# Patient Record
Sex: FEMALE | Employment: UNEMPLOYED | URBAN - METROPOLITAN AREA
[De-identification: names, ages, dates, MRNs, and addresses within clinical notes are randomized per-mention and may not be internally consistent; named-entity substitution may affect disease eponyms.]

---

## 2017-09-21 ENCOUNTER — HOSPITAL ENCOUNTER (OUTPATIENT)
Dept: GENERAL RADIOLOGY | Age: 62
Discharge: HOME | End: 2017-09-21
Attending: FAMILY MEDICINE
Payer: COMMERCIAL

## 2017-09-21 DIAGNOSIS — R68.81 EARLY SATIETY: ICD-10-CM

## 2017-09-21 PROCEDURE — 74241 XR UPPER GI W KUB/ BA SWALLOW: CPT

## 2017-09-21 PROCEDURE — 74247 XR UPPER GI W KUB AIR CONT: CPT

## 2017-09-25 ENCOUNTER — HOSPITAL ENCOUNTER (OUTPATIENT)
Dept: CT IMAGING | Age: 62
Discharge: HOME | End: 2017-09-25
Attending: FAMILY MEDICINE
Payer: COMMERCIAL

## 2017-09-25 DIAGNOSIS — R93.89 ABNORMAL CHEST X-RAY: ICD-10-CM

## 2017-09-25 LAB — CREAT BLD-MCNC: 0.9 MG/DL (ref 0.6–1.3)

## 2017-09-25 PROCEDURE — 74011636320 HC RX REV CODE- 636/320: Performed by: RADIOLOGY

## 2017-09-25 PROCEDURE — 74011000258 HC RX REV CODE- 258: Performed by: RADIOLOGY

## 2017-09-25 PROCEDURE — 71260 CT THORAX DX C+: CPT

## 2017-09-25 PROCEDURE — 82565 ASSAY OF CREATININE: CPT

## 2017-09-25 RX ORDER — SODIUM CHLORIDE 0.9 % (FLUSH) 0.9 %
10 SYRINGE (ML) INJECTION
Status: COMPLETED | OUTPATIENT
Start: 2017-09-25 | End: 2017-09-25

## 2017-09-25 RX ADMIN — SODIUM CHLORIDE 100 ML: 900 INJECTION, SOLUTION INTRAVENOUS at 10:55

## 2017-09-25 RX ADMIN — Medication 10 ML: at 10:55

## 2017-09-25 RX ADMIN — IOPAMIDOL 100 ML: 612 INJECTION, SOLUTION INTRAVENOUS at 10:55

## 2017-09-28 ENCOUNTER — HOSPITAL ENCOUNTER (OUTPATIENT)
Dept: MRI IMAGING | Age: 62
Discharge: HOME | End: 2017-09-28
Attending: INTERNAL MEDICINE
Payer: COMMERCIAL

## 2017-09-28 DIAGNOSIS — R22.2 LOCALIZED SWELLING, MASS AND LUMP, TRUNK: ICD-10-CM

## 2017-09-28 DIAGNOSIS — R51.9 HEAD ACHE: ICD-10-CM

## 2017-09-28 DIAGNOSIS — R93.89 ABNORMAL RADIOLOGICAL FINDINGS IN SKIN AND SUBCUTANEOUS TISSUE: ICD-10-CM

## 2017-09-28 PROCEDURE — 70553 MRI BRAIN STEM W/O & W/DYE: CPT

## 2017-09-28 PROCEDURE — A9576 INJ PROHANCE MULTIPACK: HCPCS | Performed by: INTERNAL MEDICINE

## 2017-09-28 PROCEDURE — 74011250636 HC RX REV CODE- 250/636: Performed by: INTERNAL MEDICINE

## 2017-09-28 RX ADMIN — GADOTERIDOL 14 ML: 279.3 INJECTION, SOLUTION INTRAVENOUS at 19:00

## 2017-09-29 ENCOUNTER — HOSPITAL ENCOUNTER (OUTPATIENT)
Dept: NUCLEAR MEDICINE | Age: 62
Discharge: HOME | End: 2017-09-29
Attending: INTERNAL MEDICINE
Payer: COMMERCIAL

## 2017-09-29 DIAGNOSIS — R22.2 LOCALIZED SWELLING, MASS AND LUMP, TRUNK: ICD-10-CM

## 2017-09-29 DIAGNOSIS — R93.89 ABNORMAL RADIOLOGICAL FINDINGS IN SKIN AND SUBCUTANEOUS TISSUE: ICD-10-CM

## 2017-09-29 DIAGNOSIS — R51.9 HEAD ACHE: ICD-10-CM

## 2017-09-29 PROCEDURE — 78306 BONE IMAGING WHOLE BODY: CPT

## 2017-10-02 ENCOUNTER — HOSPITAL ENCOUNTER (OUTPATIENT)
Dept: CT IMAGING | Age: 62
Discharge: HOME | End: 2017-10-02
Attending: INTERNAL MEDICINE
Payer: COMMERCIAL

## 2017-10-02 VITALS
HEIGHT: 62 IN | BODY MASS INDEX: 25.95 KG/M2 | TEMPERATURE: 98.6 F | OXYGEN SATURATION: 100 % | SYSTOLIC BLOOD PRESSURE: 154 MMHG | RESPIRATION RATE: 20 BRPM | DIASTOLIC BLOOD PRESSURE: 68 MMHG | HEART RATE: 56 BPM | WEIGHT: 141 LBS

## 2017-10-02 DIAGNOSIS — R51.9 HEAD ACHE: ICD-10-CM

## 2017-10-02 DIAGNOSIS — R22.2 LOCALIZED SWELLING, MASS AND LUMP, TRUNK: ICD-10-CM

## 2017-10-02 DIAGNOSIS — R93.89 ABNORMAL RADIOLOGICAL FINDINGS IN SKIN AND SUBCUTANEOUS TISSUE: ICD-10-CM

## 2017-10-02 PROCEDURE — 47000 NEEDLE BIOPSY OF LIVER PERQ: CPT

## 2017-10-02 PROCEDURE — 88342 IMHCHEM/IMCYTCHM 1ST ANTB: CPT | Performed by: INTERNAL MEDICINE

## 2017-10-02 PROCEDURE — 77030014006 HC SPNG HEMSTAT J&J -A

## 2017-10-02 PROCEDURE — 88173 CYTOPATH EVAL FNA REPORT: CPT | Performed by: INTERNAL MEDICINE

## 2017-10-02 PROCEDURE — 77030019698 HC SYR ANGI MDLON MRTM -A

## 2017-10-02 PROCEDURE — 74011250636 HC RX REV CODE- 250/636: Performed by: RADIOLOGY

## 2017-10-02 PROCEDURE — 88307 TISSUE EXAM BY PATHOLOGIST: CPT | Performed by: INTERNAL MEDICINE

## 2017-10-02 PROCEDURE — 77030003503 HC NDL BIOP TISS BD -B

## 2017-10-02 PROCEDURE — 88360 TUMOR IMMUNOHISTOCHEM/MANUAL: CPT | Performed by: INTERNAL MEDICINE

## 2017-10-02 RX ORDER — FENTANYL CITRATE 50 UG/ML
100 INJECTION, SOLUTION INTRAMUSCULAR; INTRAVENOUS
Status: DISCONTINUED | OUTPATIENT
Start: 2017-10-02 | End: 2017-10-06 | Stop reason: HOSPADM

## 2017-10-02 RX ORDER — MIDAZOLAM HYDROCHLORIDE 1 MG/ML
5 INJECTION, SOLUTION INTRAMUSCULAR; INTRAVENOUS
Status: DISCONTINUED | OUTPATIENT
Start: 2017-10-02 | End: 2017-10-06 | Stop reason: HOSPADM

## 2017-10-02 RX ORDER — MICONAZOLE NITRATE 2 %
1 CREAM WITH APPLICATOR VAGINAL 2 TIMES DAILY
COMMUNITY

## 2017-10-02 RX ORDER — AMITRIPTYLINE HYDROCHLORIDE 10 MG/1
TABLET, FILM COATED ORAL
COMMUNITY
End: 2018-08-31

## 2017-10-02 RX ORDER — TRIAMCINOLONE ACETONIDE 1 MG/ML
LOTION TOPICAL 2 TIMES DAILY
COMMUNITY

## 2017-10-02 RX ORDER — FOLIC ACID 1 MG/1
1 TABLET ORAL DAILY
COMMUNITY

## 2017-10-02 RX ORDER — ESTRADIOL 0.1 MG/G
2 CREAM VAGINAL DAILY
COMMUNITY
End: 2018-09-26

## 2017-10-02 RX ORDER — METHOTREXATE 2.5 MG/1
2.5 TABLET ORAL
COMMUNITY
End: 2018-09-26

## 2017-10-02 RX ORDER — ASPIRIN 81 MG/1
81 TABLET ORAL DAILY
COMMUNITY

## 2017-10-02 RX ORDER — OMEPRAZOLE 20 MG/1
20 CAPSULE, DELAYED RELEASE ORAL DAILY
COMMUNITY
End: 2018-10-04

## 2017-10-02 RX ORDER — SODIUM CHLORIDE 9 MG/ML
25 INJECTION, SOLUTION INTRAVENOUS CONTINUOUS
Status: DISCONTINUED | OUTPATIENT
Start: 2017-10-02 | End: 2017-10-06 | Stop reason: HOSPADM

## 2017-10-02 RX ORDER — AMLODIPINE BESYLATE 2.5 MG/1
5 TABLET ORAL DAILY
COMMUNITY
End: 2018-08-31

## 2017-10-02 RX ORDER — HYDROCODONE BITARTRATE AND ACETAMINOPHEN 10; 325 MG/1; MG/1
2 TABLET ORAL
COMMUNITY
End: 2018-09-26

## 2017-10-02 RX ORDER — HYDROXYCHLOROQUINE SULFATE 200 MG/1
200 TABLET, FILM COATED ORAL DAILY
COMMUNITY
End: 2018-09-26

## 2017-10-02 RX ORDER — BISMUTH SUBSALICYLATE 262 MG
1 TABLET,CHEWABLE ORAL DAILY
COMMUNITY

## 2017-10-02 RX ADMIN — MIDAZOLAM HYDROCHLORIDE 2 MG: 1 INJECTION INTRAMUSCULAR; INTRAVENOUS at 10:54

## 2017-10-02 RX ADMIN — FENTANYL CITRATE 50 MCG: 50 INJECTION, SOLUTION INTRAMUSCULAR; INTRAVENOUS at 10:55

## 2017-10-02 RX ADMIN — FENTANYL CITRATE 50 MCG: 50 INJECTION, SOLUTION INTRAMUSCULAR; INTRAVENOUS at 11:12

## 2017-10-02 RX ADMIN — SODIUM CHLORIDE 25 ML/HR: 900 INJECTION, SOLUTION INTRAVENOUS at 10:00

## 2017-10-02 RX ADMIN — MIDAZOLAM HYDROCHLORIDE 1 MG: 1 INJECTION INTRAMUSCULAR; INTRAVENOUS at 11:08

## 2017-10-02 NOTE — H&P
Radiology History and Physical    Patient: Joshua Mckay 60 y.o. female       Chief Complaint: No chief complaint on file. History of Present Illness: liver lesions     History:    No past medical history on file. No family history on file. Social History     Social History    Marital status:      Spouse name: N/A    Number of children: N/A    Years of education: N/A     Occupational History    Not on file. Social History Main Topics    Smoking status: Not on file    Smokeless tobacco: Not on file    Alcohol use Not on file    Drug use: Not on file    Sexual activity: Not on file     Other Topics Concern    Not on file     Social History Narrative       Allergies: Not on File    Current Medications:  Current Outpatient Prescriptions   Medication Sig    HYDROcodone-acetaminophen (NORCO)  mg tablet Take 2 Tabs by mouth every six (6) hours as needed for Pain.  hydroxychloroquine (PLAQUENIL) 200 mg tablet Take 200 mg by mouth daily.  methotrexate (RHEUMATREX) 2.5 mg tablet Take 2.5 mg by mouth.  folic acid (FOLVITE) 1 mg tablet Take 1 mg by mouth daily.  omeprazole (PRILOSEC) 20 mg capsule Take 20 mg by mouth daily.  amLODIPine (NORVASC) 2.5 mg tablet Take 2.5 mg by mouth daily.  amitriptyline (ELAVIL) 10 mg tablet Take  by mouth nightly.  estradiol (ESTRACE) 0.01 % (0.1 mg/gram) vaginal cream Insert 2 g into vagina daily.  triamcinolone (KENALOG) 0.1 % lotion Apply  to affected area two (2) times a day. use thin layer    aspirin delayed-release 81 mg tablet Take 81 mg by mouth daily.  calcium citrate-vitamin D3 (CITRACAL + D) tablet Take 1 Tab by mouth two (2) times a day.  multivitamin (ONE A DAY) tablet Take 1 Tab by mouth daily.      Current Facility-Administered Medications   Medication Dose Route Frequency    midazolam (VERSED) injection 5 mg  5 mg IntraVENous Multiple    fentaNYL citrate (PF) injection 100 mcg  100 mcg IntraVENous Multiple    0.9% sodium chloride infusion  25 mL/hr IntraVENous CONTINUOUS        Physical Exam:  Blood pressure 156/68, pulse (!) 56, temperature 98.6 °F (37 °C), resp. rate 20, height 5' 2\" (1.575 m), weight 64 kg (141 lb), SpO2 100 %. LUNG: clear to auscultation bilaterally, HEART: regular rate and rhythm, S1, S2 normal, no murmur, click, rub or gallop      Alerts:      Laboratory:    No results for input(s): HGB, HCT, WBC, PLT, INR, BUN, CREA, K, CRCLT, HGBEXT, HCTEXT, PLTEXT in the last 72 hours. No lab exists for component: PTT, PT, INREXT      Plan of Care/Planned Procedure:  Risks, benefits, and alternatives reviewed with patient and she agrees to proceed with the procedure.      Plan is for CT guided liver lesion biopsy       Chad Lou MD

## 2017-10-02 NOTE — IP AVS SNAPSHOT
[address] [facility] 
[phone] Patient: Joshua Mckay MRN: [id number] DZA:[id number] You are allergic to the following Not on File Recent Documentation Height Weight BMI  
  
  
 1.575 m 64 kg 25.79 kg/m2 Emergency Contacts Name Discharge Info Relation Home Work Mobile [address] [facility]  [address] CAREGIVER [3] Spouse [3] [phone] About your hospitalization You were admitted on:  October 2, 2017 You last received care in the:  [facility] RAD CT You were discharged on:  October 2, 2017 Unit phone number:  [phone] Why you were hospitalized Your primary diagnosis was:  Not on File Providers Seen During Your Hospitalizations Provider Role Specialty Primary office phone Lennox Diss, MD Attending Provider Hematology and Oncology [phone] Your Primary Care Physician (PCP) Primary Care Physician Office Phone Office Fax Radha Alonso [phone] Follow-up Information None Current Discharge Medication List  
  
ASK your doctor about these medications Dose & Instructions Dispensing Information Comments Morning Noon Evening Bedtime  
 amitriptyline 10 mg tablet Commonly known as:  ELAVIL Your last dose was: Your next dose is: Take  by mouth nightly. Refills:  0  
     
   
   
   
  
 amLODIPine 2.5 mg tablet Commonly known as:  [place] Your last dose was: Your next dose is:    
   
   
 Dose:  2.5 mg Take 2.5 mg by mouth daily. Refills:  0  
     
   
   
   
  
 aspirin delayed-release 81 mg tablet Your last dose was: Your next dose is:    
   
   
 Dose:  81 mg Take 81 mg by mouth daily. Refills:  0 Citracal + D tablet Generic drug:  calcium citrate-vitamin D3  
   
 Your last dose was: Your next dose is:    
   
   
 Dose:  1 Tab Take 1 Tab by mouth two (2) times a day. Refills:  0 ESTRACE 0.01 % (0.1 mg/gram) vaginal cream  
Generic drug:  estradiol Your last dose was: Your next dose is:    
   
   
 Dose:  2 g Insert 2 g into vagina daily. Refills:  0  
     
   
   
   
  
 folic acid 1 mg tablet Commonly known as:  [employer] Your last dose was: Your next dose is:    
   
   
 Dose:  1 mg Take 1 mg by mouth daily. Refills:  0 HYDROcodone-acetaminophen  mg tablet Commonly known as:  [employer] Your last dose was: Your next dose is:    
   
   
 Dose:  2 Tab Take 2 Tabs by mouth every six (6) hours as needed for Pain. Refills:  0  
     
   
   
   
  
 hydroxychloroquine 200 mg tablet Commonly known as:  PLAQUENIL Your last dose was: Your next dose is:    
   
   
 Dose:  200 mg Take 200 mg by mouth daily. Refills:  0  
     
   
   
   
  
 methotrexate 2.5 mg tablet Commonly known as:  [employer] [place] Your last dose was: Your next dose is:    
   
   
 Dose:  2.5 mg Take 2.5 mg by mouth. Refills:  0  
     
   
   
   
  
 multivitamin tablet Commonly known as:  ONE A DAY Your last dose was: Your next dose is:    
   
   
 Dose:  1 Tab Take 1 Tab by mouth daily. Refills:  0  
     
   
   
   
  
 omeprazole 20 mg capsule Commonly known as:  PRILOSEC Your last dose was: Your next dose is:    
   
   
 Dose:  20 mg Take 20 mg by mouth daily. Refills:  0  
     
   
   
   
  
 triamcinolone 0.1 % lotion Commonly known as:  KENALOG Your last dose was: Your next dose is:    
   
   
 Apply  to affected area two (2) times a day. use thin layer Refills:  0 Discharge Instructions [facility] Special Procedures/Radiology Department Radiologist:  Dr. Izzy Gross Date:10/2/2017 Liver Biopsy Discharge Instructions You may have an aching pain in the biopsy site tonight. Take Tylenol, as directed on the label, for pain or discomfort. Avoid ibuprofen (Advil, Motrin) and aspirin for the next 48 hours as these drugs may cause you to bleed. Resume your previous diet and follow the medication reconciliation form. Rest today. Do not drive or sign any legal documents activity for 24 hours because you received sedation medications. Avoid any strenuous activity for 24 hours. Do not lift anything heavier than a small grocery bag (10 pounds) and avoid twisting for the next 5 days. If you experience severe sweating, severe abdominal pain, dizziness or faintness, go to the nearest Emergency Room immediately. Pain under the left collar bone is normal. 
 
Watch for signs of infection at biopsy site:  redness, pain, drainage, fever chills. If this occurs, call you doctor. Contact your physician after 5 business days for test results. If you have any questions or concerns, please call [phone] and ask to speak to the nurse on-call. Discharge Orders None Introducing [facility]! Dear Saranya Found: Thank you for requesting a [employer] account. Our records indicate that you already have an active [employer] account. You can access your account anytime at [email or web address] Did you know that you can access your hospital and ER discharge instructions at any time in [employer]? You can also review all of your test results from your hospital stay or ER visit. Additional Information If you have questions, please visit the Frequently Asked Questions section of the [employer] website at [email or web address] Remember, [employer] is NOT to be used for urgent needs. For medical emergencies, dial 911. Now available from your iPhone and Android! General Information Please provide this summary of care documentation to your next provider. Patient Signature:  ____________________________________________________________ Date:  ____________________________________________________________  
  
Suzon Mems Provider Signature:  ____________________________________________________________ Date:  ____________________________________________________________

## 2017-10-02 NOTE — DISCHARGE INSTRUCTIONS
[address] [facility]  Special Procedures/Radiology Department    Radiologist:  Dr. Anahi Munguia    Date:10/2/2017    Liver Biopsy Discharge Instructions    You may have an aching pain in the biopsy site tonight. Take Tylenol, as directed on the label, for pain or discomfort. Avoid ibuprofen (Advil, Motrin) and aspirin for the next 48 hours as these drugs may cause you to bleed. Resume your previous diet and follow the medication reconciliation form. Rest today. Do not drive or sign any legal documents activity for 24 hours because you received sedation medications. Avoid any strenuous activity for 24 hours. Do not lift anything heavier than a small grocery bag (10 pounds) and avoid twisting for the next 5 days. If you experience severe sweating, severe abdominal pain, dizziness or faintness, go to the nearest Emergency Room immediately. Pain under the left collar bone is normal.    Watch for signs of infection at biopsy site:  redness, pain, drainage, fever chills. If this occurs, call you doctor. Contact your physician after 5 business days for test results. If you have any questions or concerns, please call [phone] and ask to speak to the nurse on-call.

## 2017-10-09 ENCOUNTER — HOSPITAL ENCOUNTER (OUTPATIENT)
Dept: GENERAL RADIOLOGY | Age: 62
Discharge: HOME | End: 2017-10-09
Payer: COMMERCIAL

## 2017-10-09 DIAGNOSIS — M25.511 RIGHT SHOULDER PAIN: ICD-10-CM

## 2017-10-09 PROCEDURE — 73030 X-RAY EXAM OF SHOULDER: CPT

## 2017-10-13 ENCOUNTER — HOSPITAL ENCOUNTER (OUTPATIENT)
Dept: MRI IMAGING | Age: 62
Discharge: HOME | End: 2017-10-13
Attending: INTERNAL MEDICINE
Payer: COMMERCIAL

## 2017-10-13 VITALS — BODY MASS INDEX: 25.79 KG/M2 | WEIGHT: 141 LBS

## 2017-10-13 DIAGNOSIS — C34.32 MALIGNANT NEOPLASM OF LOWER LOBE, LEFT BRONCHUS OR LUNG (CODE): ICD-10-CM

## 2017-10-13 DIAGNOSIS — C79.51 SECONDARY MALIGNANT NEOPLASM OF BONE (HCC): ICD-10-CM

## 2017-10-13 DIAGNOSIS — C78.7 SECONDARY MALIGNANT NEOPLASM OF LIVER AND INTRAHEPATIC BILE DUCT (HCC): ICD-10-CM

## 2017-10-13 PROCEDURE — A9576 INJ PROHANCE MULTIPACK: HCPCS | Performed by: INTERNAL MEDICINE

## 2017-10-13 PROCEDURE — 72158 MRI LUMBAR SPINE W/O & W/DYE: CPT

## 2017-10-13 PROCEDURE — 74011250636 HC RX REV CODE- 250/636: Performed by: INTERNAL MEDICINE

## 2017-10-13 RX ADMIN — GADOTERIDOL 13 ML: 279.3 INJECTION, SOLUTION INTRAVENOUS at 20:00

## 2018-04-15 ENCOUNTER — HOSPITAL ENCOUNTER (EMERGENCY)
Age: 63
Discharge: HOME | End: 2018-04-15
Attending: STUDENT IN AN ORGANIZED HEALTH CARE EDUCATION/TRAINING PROGRAM
Payer: COMMERCIAL

## 2018-04-15 ENCOUNTER — APPOINTMENT (OUTPATIENT)
Dept: CT IMAGING | Age: 63
End: 2018-04-15
Attending: STUDENT IN AN ORGANIZED HEALTH CARE EDUCATION/TRAINING PROGRAM
Payer: COMMERCIAL

## 2018-04-15 VITALS
HEART RATE: 47 BPM | SYSTOLIC BLOOD PRESSURE: 160 MMHG | DIASTOLIC BLOOD PRESSURE: 69 MMHG | BODY MASS INDEX: 26.1 KG/M2 | WEIGHT: 147.31 LBS | TEMPERATURE: 97.7 F | HEIGHT: 63 IN | OXYGEN SATURATION: 100 % | RESPIRATION RATE: 18 BRPM

## 2018-04-15 DIAGNOSIS — R42 VERTIGO: Primary | ICD-10-CM

## 2018-04-15 LAB
ALBUMIN SERPL-MCNC: 3.1 G/DL (ref 3.5–5)
ALBUMIN/GLOB SERPL: 0.6 {RATIO} (ref 1.1–2.2)
ALP SERPL-CCNC: 72 U/L (ref 45–117)
ALT SERPL-CCNC: 140 U/L (ref 12–78)
ANION GAP SERPL CALC-SCNC: 9 MMOL/L (ref 5–15)
AST SERPL-CCNC: 89 U/L (ref 15–37)
ATRIAL RATE: 50 BPM
BASOPHILS # BLD: 0 K/UL (ref 0–0.1)
BASOPHILS NFR BLD: 0 % (ref 0–1)
BILIRUB SERPL-MCNC: 0.6 MG/DL (ref 0.2–1)
BUN SERPL-MCNC: 18 MG/DL (ref 6–20)
BUN/CREAT SERPL: 18 (ref 12–20)
CALCIUM SERPL-MCNC: 9.1 MG/DL (ref 8.5–10.1)
CALCULATED P AXIS, ECG09: 67 DEGREES
CALCULATED R AXIS, ECG10: 71 DEGREES
CALCULATED T AXIS, ECG11: 60 DEGREES
CHLORIDE SERPL-SCNC: 103 MMOL/L (ref 97–108)
CO2 SERPL-SCNC: 26 MMOL/L (ref 21–32)
CREAT SERPL-MCNC: 1.01 MG/DL (ref 0.55–1.02)
DIAGNOSIS, 93000: NORMAL
DIFFERENTIAL METHOD BLD: ABNORMAL
EOSINOPHIL # BLD: 0.1 K/UL (ref 0–0.4)
EOSINOPHIL NFR BLD: 1 % (ref 0–7)
ERYTHROCYTE [DISTWIDTH] IN BLOOD BY AUTOMATED COUNT: 16.4 % (ref 11.5–14.5)
GLOBULIN SER CALC-MCNC: 5.2 G/DL (ref 2–4)
GLUCOSE SERPL-MCNC: 110 MG/DL (ref 65–100)
HCT VFR BLD AUTO: 38.9 % (ref 35–47)
HGB BLD-MCNC: 12.4 G/DL (ref 11.5–16)
IMM GRANULOCYTES # BLD: 0 K/UL (ref 0–0.04)
IMM GRANULOCYTES NFR BLD AUTO: 0 % (ref 0–0.5)
LYMPHOCYTES # BLD: 1.2 K/UL (ref 0.8–3.5)
LYMPHOCYTES NFR BLD: 15 % (ref 12–49)
MCH RBC QN AUTO: 29.3 PG (ref 26–34)
MCHC RBC AUTO-ENTMCNC: 31.9 G/DL (ref 30–36.5)
MCV RBC AUTO: 92 FL (ref 80–99)
MONOCYTES # BLD: 0.8 K/UL (ref 0–1)
MONOCYTES NFR BLD: 11 % (ref 5–13)
NEUTS SEG # BLD: 5.4 K/UL (ref 1.8–8)
NEUTS SEG NFR BLD: 72 % (ref 32–75)
NRBC # BLD: 0 K/UL (ref 0–0.01)
NRBC BLD-RTO: 0 PER 100 WBC
P-R INTERVAL, ECG05: 144 MS
PLATELET # BLD AUTO: 269 K/UL (ref 150–400)
PMV BLD AUTO: 9.3 FL (ref 8.9–12.9)
POTASSIUM SERPL-SCNC: 3.5 MMOL/L (ref 3.5–5.1)
PROT SERPL-MCNC: 8.3 G/DL (ref 6.4–8.2)
Q-T INTERVAL, ECG07: 448 MS
QRS DURATION, ECG06: 78 MS
QTC CALCULATION (BEZET), ECG08: 408 MS
RBC # BLD AUTO: 4.23 M/UL (ref 3.8–5.2)
SODIUM SERPL-SCNC: 138 MMOL/L (ref 136–145)
TROPONIN I SERPL-MCNC: <0.04 NG/ML
VENTRICULAR RATE, ECG03: 50 BPM
WBC # BLD AUTO: 7.5 K/UL (ref 3.6–11)

## 2018-04-15 PROCEDURE — 36415 COLL VENOUS BLD VENIPUNCTURE: CPT | Performed by: NURSE PRACTITIONER

## 2018-04-15 PROCEDURE — 85025 COMPLETE CBC W/AUTO DIFF WBC: CPT | Performed by: NURSE PRACTITIONER

## 2018-04-15 PROCEDURE — 80053 COMPREHEN METABOLIC PANEL: CPT | Performed by: NURSE PRACTITIONER

## 2018-04-15 PROCEDURE — 70450 CT HEAD/BRAIN W/O DYE: CPT

## 2018-04-15 PROCEDURE — 84484 ASSAY OF TROPONIN QUANT: CPT | Performed by: NURSE PRACTITIONER

## 2018-04-15 PROCEDURE — 99284 EMERGENCY DEPT VISIT MOD MDM: CPT

## 2018-04-15 PROCEDURE — 93005 ELECTROCARDIOGRAM TRACING: CPT

## 2018-04-15 PROCEDURE — 74011250636 HC RX REV CODE- 250/636: Performed by: STUDENT IN AN ORGANIZED HEALTH CARE EDUCATION/TRAINING PROGRAM

## 2018-04-15 RX ORDER — MECLIZINE HCL 12.5 MG 12.5 MG/1
25 TABLET ORAL
Status: COMPLETED | OUTPATIENT
Start: 2018-04-15 | End: 2018-04-15

## 2018-04-15 RX ORDER — MECLIZINE HYDROCHLORIDE 25 MG/1
25 TABLET ORAL
Qty: 30 TAB | Refills: 0 | Status: SHIPPED | OUTPATIENT
Start: 2018-04-15 | End: 2018-04-25

## 2018-04-15 RX ADMIN — MECLIZINE 25 MG: 12.5 TABLET ORAL at 15:16

## 2018-04-15 NOTE — ED TRIAGE NOTES
Pt states that while walking into [employer] this morning she had her first episode of feeling like her head was spinning. She states that she has had a total of 3 different episodes and states that spinning becomes worse with sudden movements of her head.

## 2018-04-15 NOTE — ED PROVIDER NOTES
HPI Comments: 58 y.o. female with past medical history significant for Lung cancer, HTN, GERD, Lupus who presents from home with chief complaint of dizziness. Pt reports an acute onset of dizziness this morning while exiting the vehicle headed into [employer]. She states that she became dizzy upon turning her head. She describes dizziness as \"my head was spinning\" which made her gait unsteady. This episode lasted for \"a couple seconds\". She had an additional episode while in [place]. She denies hx of similar symptoms. She is currently takes a chemotherapy agent for lung cancer. Pt denies any tinnitus, hearing loss or headache. There are no other acute medical concerns at this time. PCP: Isabel Dickinson MD    Note written by Scott Araujo, as dictated by Herber Pina MD 3:29 PM         The history is provided by the patient. No  was used. Past Medical History:   Diagnosis Date    Arthritis     Autoimmune disease ([facility] [address]     lupus    Cancer ([facility] [address]     lung cancer    Gastrointestinal disorder     GERD    Hypertension        History reviewed. No pertinent surgical history. History reviewed. No pertinent family history. Social History     Social History    Marital status:      Spouse name: N/A    Number of children: N/A    Years of education: N/A     Occupational History    Not on file. Social History Main Topics    Smoking status: Never Smoker    Smokeless tobacco: Never Used    Alcohol use Yes    Drug use: No    Sexual activity: Not on file     Other Topics Concern    Not on file     Social History Narrative    No narrative on file         ALLERGIES: Review of patient's allergies indicates no known allergies. Review of Systems   Constitutional: Negative for fever. HENT: Negative for congestion. Respiratory: Negative for shortness of breath. Cardiovascular: Negative for chest pain.    Gastrointestinal: Negative for abdominal pain, nausea and vomiting. Genitourinary: Negative for difficulty urinating. Musculoskeletal: Positive for gait problem (unsteady). Negative for back pain and neck pain. Neurological: Positive for dizziness. Negative for headaches. All other systems reviewed and are negative. Vitals:    04/15/18 1237   BP: 182/81   Pulse: (!) 53   Resp: 16   Temp: 97.7 °F (36.5 °C)   SpO2: (!) 20%   Weight: 66.8 kg (147 lb 5 oz)   Height: 5' 2.5\" (1.588 m)            Physical Exam   Constitutional: She is oriented to person, place, and time. She appears well-developed and well-nourished. No distress. HENT:   Head: Normocephalic and atraumatic. Nose: Nose normal.   Mouth/Throat: Oropharynx is clear and moist. No oropharyngeal exudate. Eyes: Conjunctivae and EOM are normal. Right eye exhibits no discharge. Left eye exhibits no discharge. No scleral icterus. Neck: Normal range of motion. Neck supple. No JVD present. No tracheal deviation present. No thyromegaly present. Cardiovascular: Normal rate, regular rhythm, normal heart sounds and intact distal pulses. Exam reveals no gallop and no friction rub. No murmur heard. Pulmonary/Chest: Effort normal and breath sounds normal. No stridor. No respiratory distress. She has no wheezes. She has no rales. She exhibits no tenderness. Abdominal: Bowel sounds are normal. She exhibits no distension and no mass. There is no tenderness. There is no rebound. Musculoskeletal: Normal range of motion. She exhibits no edema or tenderness. Lymphadenopathy:     She has no cervical adenopathy. Neurological: She is alert and oriented to person, place, and time. No cranial nerve deficit. Coordination normal.   Skin: Skin is warm and dry. No rash noted. She is not diaphoretic. No erythema. No pallor. Psychiatric: She has a normal mood and affect.  Her behavior is normal. Judgment and thought content normal.    Note written by Scott Grider, as dictated by Catia Del Rosario Geoff Puente MD 3:29 PM    MDM  Number of Diagnoses or Management Options  Vertigo:      Amount and/or Complexity of Data Reviewed  Clinical lab tests: ordered and reviewed  Tests in the radiology section of CPT®: ordered and reviewed  Review and summarize past medical records: yes  Independent visualization of images, tracings, or specimens: yes    Risk of Complications, Morbidity, and/or Mortality  Presenting problems: moderate  Diagnostic procedures: moderate  Management options: moderate    Patient Progress  Patient progress: improved        ED Course       Procedures    ED EKG interpretation:  Rhythm: sinus bradycardia; Rate (approx.): 50. Note written by Scott Joshua, as dictated by Damaris Buckner MD 3:12 PM    PROGRESS NOTE:  4:37 PM  Pt feeling better after Meclizine. Symptoms have resolved. 10:31 PM  The patient has been reevaluated. The patient is ready for discharge. The patient's signs, symptoms, diagnosis, and discharge instructions have been discussed and the patient/ family has conveyed their understanding. The patient is to follow up as recommended or return to the ED should their symptoms worsen. Plan has been discussed and the patient is in agreement. LABORATORY TESTS:  No results found for this or any previous visit (from the past 12 hour(s)). IMAGING RESULTS:  CT HEAD WO CONT   Final Result        No results found. MEDICATIONS GIVEN:  Medications   meclizine (ANTIVERT) tablet 25 mg (25 mg Oral Given 4/15/18 1516)       IMPRESSION:  1. Vertigo        PLAN:  1. Discharge Medication List as of 4/15/2018  4:38 PM      START taking these medications    Details   meclizine (ANTIVERT) 25 mg tablet Take 1 Tab by mouth three (3) times daily as needed for up to 10 days. , Print, Disp-30 Tab, R-0         CONTINUE these medications which have NOT CHANGED    Details   HYDROcodone-acetaminophen (NORCO)  mg tablet Take 2 Tabs by mouth every six (6) hours as needed for Pain., Historical Med      hydroxychloroquine (PLAQUENIL) 200 mg tablet Take 200 mg by mouth daily. , Historical Med      methotrexate (RHEUMATREX) 2.5 mg tablet Take 2.5 mg by mouth., Historical Med      folic acid (FOLVITE) 1 mg tablet Take 1 mg by mouth daily. , Historical Med      omeprazole (PRILOSEC) 20 mg capsule Take 20 mg by mouth daily. , Historical Med      amLODIPine (NORVASC) 2.5 mg tablet Take 2.5 mg by mouth daily. , Historical Med      amitriptyline (ELAVIL) 10 mg tablet Take  by mouth nightly., Historical Med      estradiol (ESTRACE) 0.01 % (0.1 mg/gram) vaginal cream Insert 2 g into vagina daily. , Historical Med      triamcinolone (KENALOG) 0.1 % lotion Apply  to affected area two (2) times a day. use thin layer, Historical Med      aspirin delayed-release 81 mg tablet Take 81 mg by mouth daily. , Historical Med      calcium citrate-vitamin D3 (CITRACAL + D) tablet Take 1 Tab by mouth two (2) times a day., Historical Med      multivitamin (ONE A DAY) tablet Take 1 Tab by mouth daily. , Historical Med           2.    Follow-up Information     Follow up With Details Comments Contact Info    Viri Pettit MD  If symptoms worsen [facility] [address] [phone]      [facility] [place] [facility]  If symptoms worsen [address] [facility]  [phone]            Return to ED for new or worsening symptoms       Matt Shaw MD

## 2018-04-15 NOTE — DISCHARGE INSTRUCTIONS
Vertigo: Care Instructions  Your Care Instructions    Vertigo is the feeling that you or your surroundings are moving when there is no actual movement. It is often described as a feeling of spinning, whirling, falling, or tilting. Vertigo may make you vomit or feel nauseated. You may have trouble standing or walking and may lose your balance. Vertigo is often related to an inner ear problem, but it can have other more serious causes. If vertigo continues, you may need more tests to find its cause. Follow-up care is a key part of your treatment and safety. Be sure to make and go to all appointments, and call your doctor if you are having problems. It's also a good idea to know your test results and keep a list of the medicines you take. How can you care for yourself at home? · Do not lie flat on your back. Prop yourself up slightly. This may reduce the spinning feeling. Keep your eyes open. · Move slowly so that you do not fall. · If your doctor recommends medicine, take it exactly as directed. · Do not drive while you are having vertigo. Certain exercises, called Cee-Daroff exercises, can help decrease vertigo. To do Cee-Daroff exercises:  · Sit on the edge of a bed or sofa and quickly lie down on the side that causes the worst vertigo. Lie on your side with your ear down. · Stay in this position for at least 30 seconds or until the vertigo goes away. · Sit up. If this causes vertigo, wait for it to stop. · Repeat the procedure on the other side. · Repeat this 10 times. Do these exercises 2 times a day until the vertigo is gone. When should you call for help? Call 911 anytime you think you may need emergency care. For example, call if:  ? · You passed out (lost consciousness). ? · You have symptoms of a stroke. These may include:  ¨ Sudden numbness, tingling, weakness, or loss of movement in your face, arm, or leg, especially on only one side of your body.   ¨ Sudden vision changes. ¨ Sudden trouble speaking. ¨ Sudden confusion or trouble understanding simple statements. ¨ Sudden problems with walking or balance. ¨ A sudden, severe headache that is different from past headaches. ?Call your doctor now or seek immediate medical care if:  ? · Vertigo occurs with a fever, a headache, or ringing in your ears. ? · You have new or increased nausea and vomiting. ? Watch closely for changes in your health, and be sure to contact your doctor if:  ? · Vertigo gets worse or happens more often. ? · Vertigo has not gotten better after 2 weeks. Where can you learn more? Go to http://jami-jose alfredo.info/. Enter E877 in the search box to learn more about \"Vertigo: Care Instructions. \"  Current as of: May 12, 2017  Content Version: 11.4  © 3294-0552 [employer]. Care instructions adapted under license by [employer] (which disclaims liability or warranty for this information). If you have questions about a medical condition or this instruction, always ask your healthcare professional. [place] [address] any warranty or liability for your use of this information.

## 2018-04-15 NOTE — ED NOTES
12:38 PM  I have evaluated the patient as the Provider in Triage. I have reviewed Her vital signs and the triage nurse assessment. I have talked with the patient and any available family and advised that I am the provider in triage and have ordered the appropriate study to initiate their work up based on the clinical presentation during my assessment. I have advised that the patient will be accommodated in the Main ED as soon as possible. I have also requested to contact the triage nurse or myself immediately if the patient experiences any changes in their condition during this brief waiting period. New onset dizziness, worse when turns head to the right. Several episodes today. Doesn't describe vertigo. Started minocycline in the last week for acne associate with oral chemotherapy. Unknown if bradycardia is new. Unsure of baseline HR. No bruit heard on carotids.        Keena De La Torre, NP

## 2018-08-02 ENCOUNTER — APPOINTMENT (OUTPATIENT)
Dept: GENERAL RADIOLOGY | Age: 63
End: 2018-08-02
Attending: EMERGENCY MEDICINE
Payer: COMMERCIAL

## 2018-08-02 ENCOUNTER — HOSPITAL ENCOUNTER (EMERGENCY)
Age: 63
Discharge: HOME | End: 2018-08-02
Attending: EMERGENCY MEDICINE | Admitting: EMERGENCY MEDICINE
Payer: COMMERCIAL

## 2018-08-02 VITALS
SYSTOLIC BLOOD PRESSURE: 140 MMHG | WEIGHT: 139.8 LBS | OXYGEN SATURATION: 98 % | RESPIRATION RATE: 15 BRPM | HEART RATE: 58 BPM | DIASTOLIC BLOOD PRESSURE: 56 MMHG | TEMPERATURE: 98.3 F | BODY MASS INDEX: 24.77 KG/M2 | HEIGHT: 63 IN

## 2018-08-02 DIAGNOSIS — R07.9 ACUTE CHEST PAIN: Primary | ICD-10-CM

## 2018-08-02 LAB
ALBUMIN SERPL-MCNC: 2.6 G/DL (ref 3.5–5)
ALBUMIN/GLOB SERPL: 0.4 {RATIO} (ref 1.1–2.2)
ALP SERPL-CCNC: 103 U/L (ref 45–117)
ALT SERPL-CCNC: 20 U/L (ref 12–78)
ANION GAP SERPL CALC-SCNC: 6 MMOL/L (ref 5–15)
AST SERPL-CCNC: 26 U/L (ref 15–37)
ATRIAL RATE: 66 BPM
BASOPHILS # BLD: 0 K/UL (ref 0–0.1)
BASOPHILS NFR BLD: 0 % (ref 0–1)
BILIRUB SERPL-MCNC: 0.3 MG/DL (ref 0.2–1)
BUN SERPL-MCNC: 17 MG/DL (ref 6–20)
BUN/CREAT SERPL: 17 (ref 12–20)
CALCIUM SERPL-MCNC: 8.7 MG/DL (ref 8.5–10.1)
CALCULATED P AXIS, ECG09: 35 DEGREES
CALCULATED R AXIS, ECG10: 1 DEGREES
CALCULATED T AXIS, ECG11: 12 DEGREES
CHLORIDE SERPL-SCNC: 104 MMOL/L (ref 97–108)
CO2 SERPL-SCNC: 26 MMOL/L (ref 21–32)
CREAT SERPL-MCNC: 1.02 MG/DL (ref 0.55–1.02)
DIAGNOSIS, 93000: NORMAL
DIFFERENTIAL METHOD BLD: ABNORMAL
EOSINOPHIL # BLD: 0 K/UL (ref 0–0.4)
EOSINOPHIL NFR BLD: 0 % (ref 0–7)
ERYTHROCYTE [DISTWIDTH] IN BLOOD BY AUTOMATED COUNT: 17 % (ref 11.5–14.5)
GLOBULIN SER CALC-MCNC: 6.1 G/DL (ref 2–4)
GLUCOSE SERPL-MCNC: 123 MG/DL (ref 65–100)
HCT VFR BLD AUTO: 30.8 % (ref 35–47)
HGB BLD-MCNC: 9.8 G/DL (ref 11.5–16)
IMM GRANULOCYTES # BLD: 0 K/UL (ref 0–0.04)
IMM GRANULOCYTES NFR BLD AUTO: 0 % (ref 0–0.5)
LIPASE SERPL-CCNC: 54 U/L (ref 73–393)
LYMPHOCYTES # BLD: 0.9 K/UL (ref 0.8–3.5)
LYMPHOCYTES NFR BLD: 11 % (ref 12–49)
MCH RBC QN AUTO: 27.5 PG (ref 26–34)
MCHC RBC AUTO-ENTMCNC: 31.8 G/DL (ref 30–36.5)
MCV RBC AUTO: 86.3 FL (ref 80–99)
MONOCYTES # BLD: 1.1 K/UL (ref 0–1)
MONOCYTES NFR BLD: 13 % (ref 5–13)
NEUTS SEG # BLD: 6.3 K/UL (ref 1.8–8)
NEUTS SEG NFR BLD: 76 % (ref 32–75)
NRBC # BLD: 0 K/UL (ref 0–0.01)
NRBC BLD-RTO: 0 PER 100 WBC
P-R INTERVAL, ECG05: 130 MS
PLATELET # BLD AUTO: 343 K/UL (ref 150–400)
PMV BLD AUTO: 9.9 FL (ref 8.9–12.9)
POTASSIUM SERPL-SCNC: 4 MMOL/L (ref 3.5–5.1)
PROT SERPL-MCNC: 8.7 G/DL (ref 6.4–8.2)
Q-T INTERVAL, ECG07: 390 MS
QRS DURATION, ECG06: 86 MS
QTC CALCULATION (BEZET), ECG08: 408 MS
RBC # BLD AUTO: 3.57 M/UL (ref 3.8–5.2)
SODIUM SERPL-SCNC: 136 MMOL/L (ref 136–145)
TROPONIN I SERPL-MCNC: <0.05 NG/ML
VENTRICULAR RATE, ECG03: 66 BPM
WBC # BLD AUTO: 8.3 K/UL (ref 3.6–11)

## 2018-08-02 PROCEDURE — 99285 EMERGENCY DEPT VISIT HI MDM: CPT

## 2018-08-02 PROCEDURE — 36415 COLL VENOUS BLD VENIPUNCTURE: CPT | Performed by: EMERGENCY MEDICINE

## 2018-08-02 PROCEDURE — 85025 COMPLETE CBC W/AUTO DIFF WBC: CPT | Performed by: EMERGENCY MEDICINE

## 2018-08-02 PROCEDURE — 83690 ASSAY OF LIPASE: CPT | Performed by: EMERGENCY MEDICINE

## 2018-08-02 PROCEDURE — 84484 ASSAY OF TROPONIN QUANT: CPT | Performed by: EMERGENCY MEDICINE

## 2018-08-02 PROCEDURE — 74011250636 HC RX REV CODE- 250/636: Performed by: EMERGENCY MEDICINE

## 2018-08-02 PROCEDURE — 93005 ELECTROCARDIOGRAM TRACING: CPT

## 2018-08-02 PROCEDURE — 71046 X-RAY EXAM CHEST 2 VIEWS: CPT

## 2018-08-02 PROCEDURE — 80053 COMPREHEN METABOLIC PANEL: CPT | Performed by: EMERGENCY MEDICINE

## 2018-08-02 RX ORDER — HEPARIN 100 UNIT/ML
300 SYRINGE INTRAVENOUS
Status: COMPLETED | OUTPATIENT
Start: 2018-08-02 | End: 2018-08-02

## 2018-08-02 RX ADMIN — HEPARIN 300 UNITS: 100 SYRINGE at 15:18

## 2018-08-02 NOTE — ED NOTES
11:50 AM 
I have evaluated the patient as the Provider in Triage. I have reviewed Her vital signs and the triage nurse assessment. I have talked with the patient and any available family and advised that I am the provider in triage and have ordered the appropriate study to initiate their work up based on the clinical presentation during my assessment. I have advised that the patient will be accommodated in the Main ED as soon as possible. I have also requested to contact the triage nurse or myself immediately if the patient experiences any changes in their condition during this brief waiting period. Barney Carbajal PA-C Intermittent chest tightness for 1 month. Pain occurs with deep breaths. No tightness on exertion. No associated nausea, vomiting, sweating. No tightness now. States is she takes deep breath. It would occur.

## 2018-08-02 NOTE — DISCHARGE INSTRUCTIONS
Chest Pain: Care Instructions  Your Care Instructions    There are many things that can cause chest pain. Some are not serious and will get better on their own in a few days. But some kinds of chest pain need more testing and treatment. Your doctor may have recommended a follow-up visit in the next 8 to 12 hours. If you are not getting better, you may need more tests or treatment. Even though your doctor has released you, you still need to watch for any problems. The doctor carefully checked you, but sometimes problems can develop later. If you have new symptoms or if your symptoms do not get better, get medical care right away. If you have worse or different chest pain or pressure that lasts more than 5 minutes or you passed out (lost consciousness), call 911 or seek other emergency help right away. A medical visit is only one step in your treatment. Even if you feel better, you still need to do what your doctor recommends, such as going to all suggested follow-up appointments and taking medicines exactly as directed. This will help you recover and help prevent future problems. How can you care for yourself at home? · Rest until you feel better. · Take your medicine exactly as prescribed. Call your doctor if you think you are having a problem with your medicine. · Do not drive after taking a prescription pain medicine. When should you call for help? Call 911 if:    · You passed out (lost consciousness).     · You have severe difficulty breathing.     · You have symptoms of a heart attack. These may include:  ¨ Chest pain or pressure, or a strange feeling in your chest.  ¨ Sweating. ¨ Shortness of breath. ¨ Nausea or vomiting. ¨ Pain, pressure, or a strange feeling in your back, neck, jaw, or upper belly or in one or both shoulders or arms. ¨ Lightheadedness or sudden weakness. ¨ A fast or irregular heartbeat.   After you call 911, the  may tell you to chew 1 adult-strength or 2 to 4 low-dose aspirin. Wait for an ambulance. Do not try to drive yourself.    Call your doctor today if:    · You have any trouble breathing.     · Your chest pain gets worse.     · You are dizzy or lightheaded, or you feel like you may faint.     · You are not getting better as expected.     · You are having new or different chest pain. Where can you learn more? Go to http://jami-jose alfredo.info/. Enter A120 in the search box to learn more about \"Chest Pain: Care Instructions. \"  Current as of: November 20, 2017  Content Version: 11.7  © 9411-2740 [employer]. Care instructions adapted under license by [employer] (which disclaims liability or warranty for this information). If you have questions about a medical condition or this instruction, always ask your healthcare professional. [address] any warranty or liability for your use of this information.

## 2018-08-02 NOTE — ED TRIAGE NOTES
C/o mid/lower sternal chest tightness radiating into LUQ abdomen x 1 month, worsened today. Denies N/V or SOB. Denies pain.

## 2018-08-02 NOTE — ED PROVIDER NOTES
Patient is a 58 y.o. female presenting with chest pain. Chest Pain (Angina) Pertinent negatives include no abdominal pain, no cough and no fever. History of present illness: Patient has lung cancer metastatic to liver and bone. She had radiation to her right shoulder a few weeks ago for 5 days. Previously she had radiation to her lumbar or lower thoracic back. She takes the chemotherapy tablet each day. She did not have a lobectomy or chest surgery. Patient has hypertension but denies diabetes, hyperlipidemia, and ever being a cigarette smoker. Patient's father had a myocardial infarction when he was in the 76s. The patient presents with one month of discomfort along her sternum when she takes a deep breath. Tightness lasts only one second each time and is intermittent occurring with some deep breaths but not with others. She does not have pain when she is not taking a deep breath. Chest tightness is not affected by food or movement. Patient denies fever, cough, shivering, trauma, and falls. Past Medical History:  
Diagnosis Date  Arthritis  [facility] lupus  Cancer ([facility] lung cancer  Gastrointestinal disorder GERD  Hypertension History reviewed. No pertinent surgical history. History reviewed. No pertinent family history. Social History Social History  Marital status:  Spouse name: N/A  
 Number of children: N/A  
 Years of education: N/A Occupational History  Not on file. Social History Main Topics  Smoking status: Never Smoker  Smokeless tobacco: Never Used  Alcohol use Yes  Drug use: No  
 Sexual activity: Not on file Other Topics Concern  Not on file Social History Narrative ALLERGIES: Review of patient's allergies indicates no known allergies. Review of Systems Constitutional: Positive for appetite change and fatigue. Negative for chills and fever.   
HENT: Negative for congestion and mouth sores. Eyes: Negative for pain and discharge. Respiratory: Positive for chest tightness. Negative for apnea, cough, choking and wheezing. Cardiovascular: Positive for chest pain. Gastrointestinal: Negative for abdominal distention and abdominal pain. Genitourinary: Negative for difficulty urinating and pelvic pain. Musculoskeletal: Negative for neck stiffness. Skin: Negative for color change. Psychiatric/Behavioral: Negative for agitation, behavioral problems, confusion and hallucinations. Vitals:  
 08/02/18 1151 BP: 137/81 Pulse: 70 Resp: 18 Temp: 98.2 °F (36.8 °C) SpO2: 98% Weight: 63.4 kg (139 lb 12.8 oz) Height: 5' 2.5\" (1.588 m) Physical Exam  
Constitutional: She appears well-developed and well-nourished. HENT:  
Head: Normocephalic and atraumatic. Mouth/Throat: Oropharynx is clear and moist.  
Eyes: EOM are normal. Pupils are equal, round, and reactive to light. Neck: Normal range of motion. Neck supple. Cardiovascular: Normal rate, regular rhythm, normal heart sounds and intact distal pulses. Exam reveals no gallop and no friction rub. No murmur heard. Pulmonary/Chest: Effort normal. No respiratory distress. She has no wheezes. She has no rales. Abdominal: Soft. There is no tenderness. There is no rebound. Musculoskeletal: Normal range of motion. She exhibits no tenderness. Neurological: She is alert. No cranial nerve deficit. Motor; symmetric Skin: No erythema. Psychiatric: She has a normal mood and affect. Her behavior is normal.  
Nursing note and vitals reviewed. [facility] 
 
 
ED Course Procedures ED EKG interpretation: 
Rhythm: normal sinus rhythm; and regular . Rate (approx.): 65; Axis: normal; P wave: normal; QRS interval: normal ; ST/T wave: T wave inverted; in  Lead: III , aVF , V1 , V2 , V3  and V4 ; Other findings: . This EKG was interpreted by Concepcion Witt MD,ED Provider.  3:16 PM

## 2018-08-25 ENCOUNTER — APPOINTMENT (OUTPATIENT)
Dept: CT IMAGING | Age: 63
DRG: 189 | End: 2018-08-25
Attending: STUDENT IN AN ORGANIZED HEALTH CARE EDUCATION/TRAINING PROGRAM
Payer: COMMERCIAL

## 2018-08-25 ENCOUNTER — APPOINTMENT (OUTPATIENT)
Dept: GENERAL RADIOLOGY | Age: 63
DRG: 189 | End: 2018-08-25
Attending: PHYSICIAN ASSISTANT
Payer: COMMERCIAL

## 2018-08-25 ENCOUNTER — HOSPITAL ENCOUNTER (INPATIENT)
Age: 63
LOS: 6 days | Discharge: HOME | DRG: 189 | End: 2018-08-31
Attending: STUDENT IN AN ORGANIZED HEALTH CARE EDUCATION/TRAINING PROGRAM | Admitting: HOSPITALIST
Payer: COMMERCIAL

## 2018-08-25 DIAGNOSIS — R06.09 DYSPNEA ON EXERTION: Primary | ICD-10-CM

## 2018-08-25 PROBLEM — R06.02 SHORTNESS OF BREATH: Status: ACTIVE | Noted: 2018-08-25

## 2018-08-25 LAB
ALBUMIN SERPL-MCNC: 2.4 G/DL (ref 3.5–5)
ALBUMIN/GLOB SERPL: 0.3 {RATIO} (ref 1.1–2.2)
ALP SERPL-CCNC: 104 U/L (ref 45–117)
ALT SERPL-CCNC: 20 U/L (ref 12–78)
ANION GAP SERPL CALC-SCNC: 8 MMOL/L (ref 5–15)
APPEARANCE UR: CLEAR
AST SERPL-CCNC: 28 U/L (ref 15–37)
BACTERIA URNS QL MICRO: NEGATIVE /HPF
BASOPHILS # BLD: 0 K/UL (ref 0–0.1)
BASOPHILS NFR BLD: 0 % (ref 0–1)
BILIRUB SERPL-MCNC: 0.4 MG/DL (ref 0.2–1)
BILIRUB UR QL: NEGATIVE
BNP SERPL-MCNC: 1463 PG/ML (ref 0–125)
BUN SERPL-MCNC: 22 MG/DL (ref 6–20)
BUN/CREAT SERPL: 24 (ref 12–20)
CALCIUM SERPL-MCNC: 8.1 MG/DL (ref 8.5–10.1)
CHLORIDE SERPL-SCNC: 105 MMOL/L (ref 97–108)
CO2 SERPL-SCNC: 24 MMOL/L (ref 21–32)
COLOR UR: NORMAL
COMMENT, HOLDF: NORMAL
CREAT SERPL-MCNC: 0.93 MG/DL (ref 0.55–1.02)
DIFFERENTIAL METHOD BLD: ABNORMAL
EOSINOPHIL # BLD: 0 K/UL (ref 0–0.4)
EOSINOPHIL NFR BLD: 0 % (ref 0–7)
EPITH CASTS URNS QL MICRO: NORMAL /LPF
ERYTHROCYTE [DISTWIDTH] IN BLOOD BY AUTOMATED COUNT: 17.6 % (ref 11.5–14.5)
GLOBULIN SER CALC-MCNC: 6.9 G/DL (ref 2–4)
GLUCOSE SERPL-MCNC: 168 MG/DL (ref 65–100)
GLUCOSE UR STRIP.AUTO-MCNC: NEGATIVE MG/DL
HCT VFR BLD AUTO: 30.7 % (ref 35–47)
HGB BLD-MCNC: 9.4 G/DL (ref 11.5–16)
HGB UR QL STRIP: NEGATIVE
HYALINE CASTS URNS QL MICRO: NORMAL /LPF (ref 0–5)
IMM GRANULOCYTES # BLD: 0.1 K/UL (ref 0–0.04)
IMM GRANULOCYTES NFR BLD AUTO: 1 % (ref 0–0.5)
KETONES UR QL STRIP.AUTO: NEGATIVE MG/DL
LEUKOCYTE ESTERASE UR QL STRIP.AUTO: NEGATIVE
LYMPHOCYTES # BLD: 0.5 K/UL (ref 0.8–3.5)
LYMPHOCYTES NFR BLD: 4 % (ref 12–49)
MCH RBC QN AUTO: 25.5 PG (ref 26–34)
MCHC RBC AUTO-ENTMCNC: 30.6 G/DL (ref 30–36.5)
MCV RBC AUTO: 83.4 FL (ref 80–99)
MONOCYTES # BLD: 0.5 K/UL (ref 0–1)
MONOCYTES NFR BLD: 4 % (ref 5–13)
NEUTS SEG # BLD: 12.2 K/UL (ref 1.8–8)
NEUTS SEG NFR BLD: 91 % (ref 32–75)
NITRITE UR QL STRIP.AUTO: NEGATIVE
NRBC # BLD: 0 K/UL (ref 0–0.01)
NRBC BLD-RTO: 0 PER 100 WBC
PH UR STRIP: 7 [PH] (ref 5–8)
PLATELET # BLD AUTO: 441 K/UL (ref 150–400)
PLATELET COMMENTS,PCOM: ABNORMAL
PMV BLD AUTO: 10.2 FL (ref 8.9–12.9)
POTASSIUM SERPL-SCNC: 4.1 MMOL/L (ref 3.5–5.1)
PROT SERPL-MCNC: 9.3 G/DL (ref 6.4–8.2)
PROT UR STRIP-MCNC: NEGATIVE MG/DL
RBC # BLD AUTO: 3.68 M/UL (ref 3.8–5.2)
RBC #/AREA URNS HPF: NORMAL /HPF (ref 0–5)
RBC MORPH BLD: ABNORMAL
SAMPLES BEING HELD,HOLD: NORMAL
SODIUM SERPL-SCNC: 137 MMOL/L (ref 136–145)
SP GR UR REFRACTOMETRY: 1.02 (ref 1–1.03)
TROPONIN I SERPL-MCNC: <0.05 NG/ML
UR CULT HOLD, URHOLD: NORMAL
UROBILINOGEN UR QL STRIP.AUTO: 0.2 EU/DL (ref 0.2–1)
WBC # BLD AUTO: 13.3 K/UL (ref 3.6–11)
WBC URNS QL MICRO: NORMAL /HPF (ref 0–4)

## 2018-08-25 PROCEDURE — 99285 EMERGENCY DEPT VISIT HI MDM: CPT

## 2018-08-25 PROCEDURE — 93005 ELECTROCARDIOGRAM TRACING: CPT

## 2018-08-25 PROCEDURE — 80053 COMPREHEN METABOLIC PANEL: CPT | Performed by: PHYSICIAN ASSISTANT

## 2018-08-25 PROCEDURE — 65270000029 HC RM PRIVATE

## 2018-08-25 PROCEDURE — 74011250636 HC RX REV CODE- 250/636: Performed by: HOSPITALIST

## 2018-08-25 PROCEDURE — 36415 COLL VENOUS BLD VENIPUNCTURE: CPT | Performed by: PHYSICIAN ASSISTANT

## 2018-08-25 PROCEDURE — 83880 ASSAY OF NATRIURETIC PEPTIDE: CPT | Performed by: PHYSICIAN ASSISTANT

## 2018-08-25 PROCEDURE — 85025 COMPLETE CBC W/AUTO DIFF WBC: CPT | Performed by: PHYSICIAN ASSISTANT

## 2018-08-25 PROCEDURE — 84484 ASSAY OF TROPONIN QUANT: CPT | Performed by: PHYSICIAN ASSISTANT

## 2018-08-25 PROCEDURE — 71275 CT ANGIOGRAPHY CHEST: CPT

## 2018-08-25 PROCEDURE — 81001 URINALYSIS AUTO W/SCOPE: CPT | Performed by: PHYSICIAN ASSISTANT

## 2018-08-25 PROCEDURE — 74011000258 HC RX REV CODE- 258: Performed by: STUDENT IN AN ORGANIZED HEALTH CARE EDUCATION/TRAINING PROGRAM

## 2018-08-25 PROCEDURE — 74011636320 HC RX REV CODE- 636/320: Performed by: STUDENT IN AN ORGANIZED HEALTH CARE EDUCATION/TRAINING PROGRAM

## 2018-08-25 RX ORDER — SODIUM CHLORIDE 0.9 % (FLUSH) 0.9 %
10 SYRINGE (ML) INJECTION
Status: COMPLETED | OUTPATIENT
Start: 2018-08-25 | End: 2018-08-25

## 2018-08-25 RX ORDER — SODIUM CHLORIDE 0.9 % (FLUSH) 0.9 %
5-10 SYRINGE (ML) INJECTION AS NEEDED
Status: DISCONTINUED | OUTPATIENT
Start: 2018-08-25 | End: 2018-08-31 | Stop reason: HOSPADM

## 2018-08-25 RX ORDER — ASPIRIN 81 MG/1
81 TABLET ORAL DAILY
Status: DISCONTINUED | OUTPATIENT
Start: 2018-08-26 | End: 2018-08-31 | Stop reason: HOSPADM

## 2018-08-25 RX ORDER — ENOXAPARIN SODIUM 100 MG/ML
40 INJECTION SUBCUTANEOUS EVERY 24 HOURS
Status: DISCONTINUED | OUTPATIENT
Start: 2018-08-26 | End: 2018-08-31 | Stop reason: HOSPADM

## 2018-08-25 RX ORDER — AMLODIPINE BESYLATE 5 MG/1
2.5 TABLET ORAL DAILY
Status: DISCONTINUED | OUTPATIENT
Start: 2018-08-26 | End: 2018-08-26

## 2018-08-25 RX ORDER — FUROSEMIDE 10 MG/ML
40 INJECTION INTRAMUSCULAR; INTRAVENOUS ONCE
Status: COMPLETED | OUTPATIENT
Start: 2018-08-25 | End: 2018-08-25

## 2018-08-25 RX ORDER — SODIUM CHLORIDE 0.9 % (FLUSH) 0.9 %
5-10 SYRINGE (ML) INJECTION EVERY 8 HOURS
Status: DISCONTINUED | OUTPATIENT
Start: 2018-08-26 | End: 2018-08-31 | Stop reason: HOSPADM

## 2018-08-25 RX ORDER — LEVOFLOXACIN 5 MG/ML
500 INJECTION, SOLUTION INTRAVENOUS EVERY 24 HOURS
Status: COMPLETED | OUTPATIENT
Start: 2018-08-26 | End: 2018-08-30

## 2018-08-25 RX ORDER — IPRATROPIUM BROMIDE AND ALBUTEROL SULFATE 2.5; .5 MG/3ML; MG/3ML
3 SOLUTION RESPIRATORY (INHALATION)
Status: DISCONTINUED | OUTPATIENT
Start: 2018-08-26 | End: 2018-08-30

## 2018-08-25 RX ORDER — FOLIC ACID 1 MG/1
1 TABLET ORAL DAILY
Status: DISCONTINUED | OUTPATIENT
Start: 2018-08-26 | End: 2018-08-31 | Stop reason: HOSPADM

## 2018-08-25 RX ORDER — HYDROCODONE BITARTRATE AND ACETAMINOPHEN 10; 325 MG/1; MG/1
2 TABLET ORAL
Status: DISCONTINUED | OUTPATIENT
Start: 2018-08-25 | End: 2018-08-31 | Stop reason: HOSPADM

## 2018-08-25 RX ADMIN — IOPAMIDOL 70 ML: 755 INJECTION, SOLUTION INTRAVENOUS at 19:38

## 2018-08-25 RX ADMIN — FUROSEMIDE 40 MG: 10 INJECTION, SOLUTION INTRAMUSCULAR; INTRAVENOUS at 22:17

## 2018-08-25 RX ADMIN — SODIUM CHLORIDE 100 ML: 900 INJECTION, SOLUTION INTRAVENOUS at 19:38

## 2018-08-25 RX ADMIN — Medication 10 ML: at 19:38

## 2018-08-25 NOTE — IP AVS SNAPSHOT
[address] [place] [address] 
[phone] Patient: Laura Velasco MRN: [id number] EFT:[birth year] You are allergic to the following No active allergies Recent Documentation Height Weight Breastfeeding? BMI OB Status Smoking Status 1.588 m 66.9 kg No 26.55 kg/m2 Hysterectomy Never Smoker Emergency Contacts  (Rel.) Home Phone Work Phone Mobile Phone Nisha Ashton [phone] About your hospitalization You were admitted on:  August 25, 2018 You last received care in the:  [facility] You were discharged on:  August 31, 2018 Why you were hospitalized Your primary diagnosis was:  Shortness Of Breath Providers Seen During Your Hospitalization Provider Specialty Primary office phone Ramos Peck MD Emergency Medicine [phone] Joey Patten MD Internal Medicine [phone] Yuri Adams MD Internal Medicine [phone] Flory Brown MD Internal Medicine [phone] Ashutosh Chavez MD Internal Medicine [phone] Your Primary Care Physician (PCP) Primary Care Physician Office Phone Office Fax Sergei Wagner [phone] Follow-up Information Follow up With Details Comments Contact Info Paige Buckner MD   [facility] [address] [facility] 
[phone] My Medications TAKE these medications as instructed Instructions Each Dose to Equal  
 Morning Noon Evening Bedtime  
 acetaminophen 325 mg tablet Commonly known as:  TYLENOL Your last dose was: Your next dose is: Take 2 Tabs by mouth every six (6) hours as needed. Indications: Headache Disorder, Pain 650 mg  
    
   
   
   
  
 acyclovir 400 mg tablet Commonly known as:  ZOVIRAX Your last dose was: Your next dose is: Take 400 mg by mouth two (2) times a day. 400 mg  
    
   
   
   
  
 amitriptyline 10 mg tablet Commonly known as:  ELAVIL Your last dose was: Your next dose is: Take  by mouth nightly. amLODIPine 10 mg tablet Commonly known as:  Jacquie Guptaon Start taking on:  9/1/2018 Your last dose was: Your next dose is: Take 1 Tab by mouth daily. Indications: hypertension 10 mg  
    
   
   
   
  
 aspirin delayed-release 81 mg tablet Your last dose was: Your next dose is: Take 81 mg by mouth daily. 81 mg  
    
   
   
   
  
 Citracal + D tablet Generic drug:  calcium citrate-vitamin D3 Your last dose was: Your next dose is: Take 1 Tab by mouth two (2) times a day. 1 Tab ESTRACE 0.01 % (0.1 mg/gram) vaginal cream  
Generic drug:  estradiol Your last dose was: Your next dose is: Insert 2 g into vagina daily. 2 g  
    
   
   
   
  
 folic acid 1 mg tablet Commonly known as:  Kirstin Your last dose was: Your next dose is: Take 1 mg by mouth daily. 1 mg HYDROcodone-acetaminophen  mg tablet Commonly known as:  Iman Barone Your last dose was: Your next dose is: Take 2 Tabs by mouth every six (6) hours as needed for Pain. 2 Tab HYDROmorphone 2 mg tablet Commonly known as:  DILAUDID Your last dose was: Your next dose is: Take 2 mg by mouth every four (4) hours as needed for Pain. 2 mg  
    
   
   
   
  
 hydroxychloroquine 200 mg tablet Commonly known as:  PLAQUENIL Your last dose was: Your next dose is: Take 200 mg by mouth daily. 200 mg  
    
   
   
   
  
 methotrexate 2.5 mg tablet Commonly known as:  Roc Waddell Your last dose was: Your next dose is: Take 2.5 mg by mouth. 2.5 mg  
    
   
   
   
  
 minocycline 100 mg tablet Commonly known as:  Marbin Hutson Your last dose was: Your next dose is: Take 100 mg by mouth two (2) times a day. 100 mg  
    
   
   
   
  
 multivitamin tablet Commonly known as:  ONE A DAY Your last dose was: Your next dose is: Take 1 Tab by mouth daily. 1 Tab  
    
   
   
   
  
 omeprazole 20 mg capsule Commonly known as:  PRILOSEC Your last dose was: Your next dose is: Take 20 mg by mouth daily. 20 mg  
    
   
   
   
  
 oxyCODONE ER 20 mg ER tablet Commonly known as:  OxyCONTIN Your last dose was: Your next dose is: Take 20 mg by mouth every twelve (12) hours. Indications: Chronic Pain 20 mg  
    
   
   
   
  
 triamcinolone 0.1 % lotion Commonly known as:  KENALOG Your last dose was: Your next dose is:    
   
   
 Apply  to affected area two (2) times a day. use thin layer Where to Get Your Medications Information on where to get these meds will be given to you by the nurse or doctor. ! Ask your nurse or doctor about these medications  
  acetaminophen 325 mg tablet  
 amLODIPine 10 mg tablet Discharge Instructions Shortness of Breath: Care Instructions Your Care Instructions Shortness of breath has many causes. Sometimes conditions such as anxiety can lead to shortness of breath. Some people get mild shortness of breath when they exercise. Trouble breathing also can be a symptom of a serious problem, such as asthma, lung disease, emphysema, heart problems, and pneumonia. If your shortness of breath continues, you may need tests and treatment. Watch for any changes in your breathing and other symptoms. Follow-up care is a key part of your treatment and safety.  Be sure to make and go to all appointments, and call your doctor if you are having problems. It's also a good idea to know your test results and keep a list of the medicines you take. How can you care for yourself at home? · Do not smoke or allow others to smoke around you. If you need help quitting, talk to your doctor about stop-smoking programs and medicines. These can increase your chances of quitting for good. · Get plenty of rest and sleep. · Take your medicines exactly as prescribed. Call your doctor if you think you are having a problem with your medicine. · Find healthy ways to deal with stress. ¨ Exercise daily. ¨ Get plenty of sleep. ¨ Eat regularly and well. When should you call for help? Call 911 anytime you think you may need emergency care. For example, call if: 
  · You have severe shortness of breath.  
  · You have symptoms of a heart attack. These may include: ¨ Chest pain or pressure, or a strange feeling in the chest. 
¨ Sweating. ¨ Shortness of breath. ¨ Nausea or vomiting. ¨ Pain, pressure, or a strange feeling in the back, neck, jaw, or upper belly or in one or both shoulders or arms. ¨ Lightheadedness or sudden weakness. ¨ A fast or irregular heartbeat. After you call 911, the  may tell you to chew 1 adult-strength or 2 to 4 low-dose aspirin. Wait for an ambulance. Do not try to drive yourself.  
 Call your doctor now or seek immediate medical care if: 
  · Your shortness of breath gets worse or you start to wheeze. Wheezing is a high-pitched sound when you breathe.  
  · You wake up at night out of breath or have to prop your head up on several pillows to breathe.  
  · You are short of breath after only light activity or while at rest.  
 Watch closely for changes in your health, and be sure to contact your doctor if: 
  · You do not get better over the next 1 to 2 days. Where can you learn more? Go to http://jami-jose alfredo.info/. Enter S780 in the search box to learn more about \"Shortness of Breath: Care Instructions. \" Current as of: December 6, 2017 Content Version: 11.7 © 0050-6336 [employer]. Care instructions adapted under license by [employer] (which disclaims liability or warranty for this information). If you have questions about a medical condition or this instruction, always ask your healthcare professional. [place] [address] any warranty or liability for your use of this information. Discharge Instructions PATIENT ID: Kami Angel MRN: [id number] YOB: 1955 DATE OF ADMISSION: 8/25/2018  6:38 PM   
DATE OF DISCHARGE: 8/31/2018 PRIMARY CARE PROVIDER: Merline Jara MD  
 
 
DISCHARGING PHYSICIAN: Jose Obrien MD   
To contact this individual call [phone] and ask the  to page. If unavailable ask to be transferred the Adult Hospitalist Department. DISCHARGE DIAGNOSES Hypoxic respiratory failure, Metastatic lung cancer CONSULTATIONS: IP CONSULT TO HEMATOLOGY 
IP CONSULT TO PULMONOLOGY PROCEDURES/SURGERIES: * No surgery found * PENDING TEST RESULTS:  
At the time of discharge the following test results are still pending: FOLLOW UP APPOINTMENTS:  
Follow-up Information Follow up With Details Comments Contact Info Francesco Corea MD   [address] 
[phone] ADDITIONAL CARE RECOMMENDATIONS: 1 liter of oxygen via nasal cannula during ambulation DIET: Cardiac Diet ACTIVITY: Activity as tolerated WOUND CARE:  
 
EQUIPMENT needed:  
 
 
  
 SNF/Inpatient Rehab/LTAC Independent/assisted living Hospice Other: CDMP Checked:  
Yes x Signed:  
Gentry Mcwilliams MD 
8/31/2018 12:42 PM 
 
Discharge Instructions Attachments/References OXYGEN THERAPY (ENGLISH) Discharge Orders None [facility] Announcement We are excited to announce that we are making your provider's discharge notes available to you in Kallik. You will see these notes when they are completed and signed by the physician that discharged you from your recent hospital stay. If you have any questions or concerns about any information you see in Kallik, please call the Health Information Department where you were seen or reach out to your Primary Care Provider for more information about your plan of care. Introducing [facility]! Dear Allan Anderson: Thank you for requesting a Kallik account. Our records indicate that you already have an active Kallik account. You can access your account anytime at [email or web address] Did you know that you can access your hospital and ER discharge instructions at any time in Kallik? You can also review all of your test results from your hospital stay or ER visit. Additional Information If you have questions, please visit the Frequently Asked Questions section of the Kallik website at [email or web address] Remember, Kallik is NOT to be used for urgent needs. For medical emergencies, dial 911. Now available from your iPhone and Android! General Information Please provide this summary of care documentation to your next provider. Patient Signature:  ____________________________________________________________ Date:  ____________________________________________________________  
  
Chaparro Naas Provider Signature:  ____________________________________________________________ Date:  ____________________________________________________________

## 2018-08-25 NOTE — ED PROVIDER NOTES
HPI Comments: 58 y.o. female with past medical history significant for Hypertension, Lung Cancer, and Lupus who presents from Home with chief complaint of Shortness of Breath. Patient has a previous history of lung cancer metastatic to liver and bone, initially diagnosed in October 2017 followed by Oncologist, Liset Love. Patient has previously undergone radiation therapy to her right shoulder and to her lumbar or lower thoracic back. Patient states she completed her first chemotherapy infusion yesterday. Pt reports intermittent shortness of breath at baseline. Patient states onset \"this morning\" of shortness of breath. Pt states gradually worsening symptoms since onset. Pt states accompanying bilateral ankle swelling. Pt denies fever, chills, cough, congestion,  chest pain, abdominal pain, nausea, vomiting, diarrhea, difficulty with urination or dysuria. There are no other acute medical concerns at this time. PCP: Mary Mcdonald MD    Note written by Scott Louis, as dictated by Ramin Bojorquez MD 7:09 PM    The history is provided by the patient. Past Medical History:   Diagnosis Date    Arthritis     Autoimmune disease ([facility] [address]     lupus    Cancer ([facility] [address]     lung cancer    Gastrointestinal disorder     GERD    Hypertension        History reviewed. No pertinent surgical history. History reviewed. No pertinent family history. Social History     Social History    Marital status:      Spouse name: N/A    Number of children: N/A    Years of education: N/A     Occupational History    Not on file. Social History Main Topics    Smoking status: Never Smoker    Smokeless tobacco: Never Used    Alcohol use Yes    Drug use: No    Sexual activity: Not on file     Other Topics Concern    Not on file     Social History Narrative         ALLERGIES: Review of patient's allergies indicates no known allergies.     Review of Systems   Constitutional: Negative for chills and fever.   HENT: Negative for congestion. Respiratory: Positive for shortness of breath. Negative for cough. Cardiovascular: Negative for chest pain. Gastrointestinal: Negative for abdominal pain, diarrhea, nausea and vomiting. Genitourinary: Negative for difficulty urinating and dysuria. Musculoskeletal: Positive for arthralgias. All other systems reviewed and are negative. Vitals:    08/25/18 1824   BP: 180/80   Pulse: (!) 57   Resp: 27   Temp: 97.6 °F (36.4 °C)   SpO2: 96%   Weight: 66.9 kg (147 lb 8 oz)   Height: 5' 2.5\" (1.588 m)            Physical Exam   Constitutional: She is oriented to person, place, and time. She appears well-developed and well-nourished. Appears uncomfortable. Difficulty completing sentences    HENT:   Head: Normocephalic and atraumatic. Nose: Nose normal.   Mouth/Throat: Oropharynx is clear and moist. No oropharyngeal exudate. Eyes: Conjunctivae and EOM are normal. Right eye exhibits no discharge. Left eye exhibits no discharge. No scleral icterus. Neck: Normal range of motion. Neck supple. No JVD present. No tracheal deviation present. No thyromegaly present. Cardiovascular: Normal rate, regular rhythm, normal heart sounds and intact distal pulses. Exam reveals no gallop and no friction rub. No murmur heard. Pulmonary/Chest: Effort normal and breath sounds normal. No stridor. No respiratory distress. She has no wheezes. She has no rales. She exhibits no tenderness. Port-a-cath right upper chest wall   Abdominal: Bowel sounds are normal. She exhibits no distension and no mass. There is no tenderness. There is no rebound. Musculoskeletal: Normal range of motion. She exhibits no edema or tenderness. Lymphadenopathy:     She has no cervical adenopathy. Neurological: She is alert and oriented to person, place, and time. No cranial nerve deficit. Coordination normal.   Skin: Skin is warm and dry. No rash noted. She is not diaphoretic. No erythema.  No pallor. Psychiatric: She has a normal mood and affect. Her behavior is normal. Judgment and thought content normal.   Nursing note and vitals reviewed. Note written by Scott Kim, as dictated by Willie Kimble MD 7:09 PM    MDM  Number of Diagnoses or Management Options  Dyspnea on exertion:      Amount and/or Complexity of Data Reviewed  Clinical lab tests: ordered and reviewed  Tests in the radiology section of CPT®: reviewed and ordered  Review and summarize past medical records: yes  Discuss the patient with other providers: yes  Independent visualization of images, tracings, or specimens: yes    Risk of Complications, Morbidity, and/or Mortality  Presenting problems: moderate  Diagnostic procedures: moderate  Management options: moderate    Patient Progress  Patient progress: stable        ED Course       Procedures      PROGRESS NOTE:8:58 PM  Cta Chest W Or W Wo Cont    Result Date: 8/25/2018  IMPRESSION: 1. No visualized pulmonary embolus. 2. Progression of disease with multiple pulmonary nodules. Liver lesion which is not well visualized. 9:03 PM  Patient is being admitted to the hospital.  The results of their tests and reasons for their admission have been discussed with them and/or available family. They convey agreement and understanding for the need to be admitted and for their admission diagnosis. Consultation will be made now with the inpatient physician for hospitalization. CONSULT NOTE:  9:15 PM Willie Kimble MD spoke with Dr. Toni Patel, Consult for Hospitalist.  Discussed available diagnostic tests and clinical findings. 7:28 PM  Patient is being admitted to the hospital.  The results of their tests and reasons for their admission have been discussed with them and/or available family. They convey agreement and understanding for the need to be admitted and for their admission diagnosis.   Consultation has been made with the inpatient physician specialist for hospitalization. LABORATORY TESTS:  Recent Results (from the past 12 hour(s))   NT-PRO BNP    Collection Time: 08/28/18 11:26 AM   Result Value Ref Range    NT pro- (H) 0 - 125 PG/ML   C REACTIVE PROTEIN, QT    Collection Time: 08/28/18 11:26 AM   Result Value Ref Range    C-Reactive protein 5.82 (H) 0.00 - 0.60 mg/dL   SED RATE (ESR)    Collection Time: 08/28/18 11:26 AM   Result Value Ref Range    Sed rate, automated 83 (H) 0 - 30 mm/hr   SAMPLES BEING HELD    Collection Time: 08/28/18 11:26 AM   Result Value Ref Range    SAMPLES BEING HELD 1PST     COMMENT        Add-on orders for these samples will be processed based on acceptable specimen integrity and analyte stability, which may vary by analyte. GLUCOSE, POC    Collection Time: 08/28/18 11:30 AM   Result Value Ref Range    Glucose (POC) 110 (H) 65 - 100 mg/dL    Performed by Jaqueline Blackmon, POC    Collection Time: 08/28/18  6:37 PM   Result Value Ref Range    Glucose (POC) 124 (H) 65 - 100 mg/dL    Performed by Cameron Avendano        IMAGING RESULTS:  CT CHEST WO CONT   Final Result      XR CHEST PA LAT   Final Result      XR CHEST PORT   Final Result      CTA CHEST W OR W WO CONT   Final Result        Xr Chest Pa Lat    Result Date: 8/28/2018  Exam:  2 view chest Indication: Dyspnea, hypoxia Comparison to earlier the same day. PA and lateral views demonstrate normal heart size. There is little change in the left lower lobe infiltrate/atelectasis. Atelectasis at the right base is unchanged as well. Port-A-Cath remains in satisfactory position. The osseous structures are unremarkable. Impression: Little change left lower lobe infiltrate/atelectasis. Stable right basilar atelectasis.      Ct Chest Wo Cont    Result Date: 8/28/2018  Indication: Bronchiectasis, interstitial lung disease, high-resolution CT. COMPARISON: CT scan of 9/25/2017 and CT scan of 8/25/2018 Multiple axial images were obtained from the lung apices to the adrenal glands. Intravenous contrast into was not utilized. Images were reformatted in the sagittal and coronal plane. High-resolution images obtained inspiration and expiration and in the prone position. CT dose reduction was achieved through use of a standardized protocol tailored for this examination and automatic exposure control for dose modulation. Adaptive statistical iterative reconstruction (ASIR) was utilized. The Port-A-Cath has its tip at the junction of the right atrium and SVC. No thyroid nodules. No axillary adenopathy. There appears to be abnormality about the right shoulder joint there appears to be increased density and fluid suggesting fluid in the right shoulder joint. The fluid is relatively high density. Clinical correlation is suggested. Fairly extensive sclerotic density involving the right glenoid fossa and scapula likely on the basis of metastatic disease which may account for the abnormality about the right shoulder. Clinical correlation is suggested. This was not present on 9/25/2017. There is an enlarged subcarinal lymph node similar to the examination of 9/25/2017. No significant pleural effusions. There is a small pericardial effusion. The lung windows and high-resolution images reveal multiple bilateral pulmonary nodules in all lung fields this is consistent metastases. These have developed when compared 9/25/2017. The dominant nodule in left lower lobe measures 2.2 x 1.4 cm. Previously this mass measured 3.9 x 3.4 cm. This has decreased but the several pulmonary nodules are new. There is volume loss in both lower lobes as evidenced by the low lung volumes in the lower lobes and displaced major fissures. And there is associated bronchiectasis in both lower lobes this is new when compared with 9/25/2017. It is likely that the bronchiectasis is result of this chronic appearing volume loss. Could this be related to radiation fibrosis? No definite fibrosis.  Upper lung zones reveal no fibrosis or bronchiectasis. Mabeline Sink Patchy opacities in the right middle lobe and laterally in the right upper lobe have the appearance of mild alveolitis. Expiratory CT scan reveals no significant air trapping. The not well evaluated the liver demonstrates a large heterogeneous mass in the right lobe near the dome this measures 7.7 x 6.2 cm. There are other smaller lesions present. On the previous CT scan of 9/25/2017 there were several large masses in the dome of liver perhaps these have grown together. Review of the bone windows reveal no destructive lesions. There is a sclerotic lesion in the right scapula involving the glenoid portion of the scapula. IMPRESSION: 1. Large mass in the liver consistent metastases. 2. Dominant lesion previously seen in the left lower lobe on examination of 9/25/2017 has decreased. However, there are new pulmonary nodules bilaterally consistent with metastases. 3. Persistent enlarged subcarinal lymph node. 4. Metastases to the right scapula. 5. Chronic appearing volume loss in both lower lobes with bronchiectasis. It is possible that the bronchiectasis is related to the chronic volume loss. Could this be related to radiation therapy? This has developed when compared 9/25/2017. No evidence for pulmonary fibrosis. Please see above text. Xr Chest Port    Result Date: 8/28/2018  EXAM:  XR CHEST PORT INDICATION:  Evaluate for infiltrate COMPARISON:  8-2-18 FINDINGS: A portable AP radiograph of the chest was obtained at 0815 hours. The Port-A-Cath overlies the SVC. Heart size is normal. The lungs continue to demonstrate low lung volumes with bibasilar areas of opacity left greater right most consistent atelectasis. This is unchanged in the interval. Mid and upper lung zones are clear. IMPRESSION: 1. Low lung volumes with bibasilar opacities most consistent atelectasis this has not changed significantly when compared 8-2-18. Follow-up to resolution is suggested.       MEDICATIONS GIVEN:  Medications   aspirin delayed-release tablet 81 mg (81 mg Oral Given 8/28/18 0918)   HYDROcodone-acetaminophen (NORCO)  mg tablet 2 Tab (2 Tabs Oral Given 8/28/18 1258)   albuterol-ipratropium (DUO-NEB) 2.5 MG-0.5 MG/3 ML (3 mL Nebulization Given 8/28/18 1401)   levoFLOXacin (LEVAQUIN) 500 mg in D5W IVPB (500 mg IntraVENous New Bag 8/27/18 2309)   sodium chloride (NS) flush 5-10 mL (10 mL IntraVENous Given 8/28/18 1504)   sodium chloride (NS) flush 5-10 mL (10 mL IntraVENous Given 8/27/18 1230)   enoxaparin (LOVENOX) injection 40 mg (40 mg SubCUTAneous Given 5/08/01 6244)   folic acid (FOLVITE) tablet 1 mg (1 mg Oral Given 8/28/18 0918)   oxyCODONE ER (OxyCONTIN) tablet 20 mg (20 mg Oral Given 8/28/18 0651)   HYDROmorphone (DILAUDID) tablet 2 mg (2 mg Oral Given 8/28/18 1649)   polyethylene glycol (MIRALAX) packet 17 g (17 g Oral Given 8/28/18 0918)   amLODIPine (NORVASC) tablet 5 mg (5 mg Oral Given 8/28/18 0918)   minocycline (MINOCIN, DYNACIN) capsule 100 mg (100 mg Oral Given 8/28/18 0918)   acyclovir (ZOVIRAX) capsule 400 mg (400 mg Oral Given 8/28/18 0918)   ondansetron (ZOFRAN) injection 4 mg (4 mg IntraVENous Given 8/28/18 0510)   mirtazapine (REMERON SOL-TAB) disintegrating tablet 15 mg (15 mg Oral Given 8/27/18 2308)   0.9% sodium chloride infusion (75 mL/hr IntraVENous New Bag 8/28/18 1300)   sodium chloride (NS) flush 10 mL (10 mL IntraVENous Given 8/25/18 1938)   iopamidol (ISOVUE-370) 76 % injection 100 mL (0 mL IntraVENous IV Completed 8/25/18 1940)   sodium chloride 0.9 % bolus infusion 100 mL (0 mL IntraVENous IV Completed 8/25/18 1940)   furosemide (LASIX) injection 40 mg (40 mg IntraVENous Given 8/25/18 2217)       IMPRESSION:  1. Dyspnea on exertion        PLAN:  1.  Admit to Kenzie Phillips MD

## 2018-08-25 NOTE — ED TRIAGE NOTES
Pt being treated for lung cancer and had 1st chemo infusion yesterday. Developed increased SOB today with bilateral ankle edema. Denies CP.

## 2018-08-25 NOTE — ED NOTES
6:19 PM  I have evaluated the patient as the Provider in Triage. I have reviewed Her vital signs and the triage nurse assessment. I have talked with the patient and any available family and advised that I am the provider in triage and have ordered the appropriate study to initiate their work up based on the clinical presentation during my assessment. I have advised that the patient will be accommodated in the Main ED as soon as possible. I have also requested to contact the triage nurse or myself immediately if the patient experiences any changes in their condition during this brief waiting period. Started chemo for lung CA yesterday. Today started with SOB. Denies CP.  + ankle swelling.     JEFERSON Brandon

## 2018-08-26 LAB
ANION GAP SERPL CALC-SCNC: 9 MMOL/L (ref 5–15)
ARTERIAL PATENCY WRIST A: NO
ATRIAL RATE: 53 BPM
BASE EXCESS BLD CALC-SCNC: 2 MMOL/L
BDY SITE: ABNORMAL
BUN SERPL-MCNC: 24 MG/DL (ref 6–20)
BUN/CREAT SERPL: 29 (ref 12–20)
CALCIUM SERPL-MCNC: 7.7 MG/DL (ref 8.5–10.1)
CALCULATED P AXIS, ECG09: 38 DEGREES
CALCULATED T AXIS, ECG11: 5 DEGREES
CHLORIDE SERPL-SCNC: 105 MMOL/L (ref 97–108)
CO2 SERPL-SCNC: 25 MMOL/L (ref 21–32)
CREAT SERPL-MCNC: 0.83 MG/DL (ref 0.55–1.02)
DIAGNOSIS, 93000: NORMAL
ERYTHROCYTE [DISTWIDTH] IN BLOOD BY AUTOMATED COUNT: 17.6 % (ref 11.5–14.5)
GAS FLOW.O2 O2 DELIVERY SYS: ABNORMAL L/MIN
GLUCOSE BLD STRIP.AUTO-MCNC: 123 MG/DL (ref 65–100)
GLUCOSE BLD STRIP.AUTO-MCNC: 126 MG/DL (ref 65–100)
GLUCOSE BLD STRIP.AUTO-MCNC: 136 MG/DL (ref 65–100)
GLUCOSE BLD STRIP.AUTO-MCNC: 138 MG/DL (ref 65–100)
GLUCOSE BLD STRIP.AUTO-MCNC: 177 MG/DL (ref 65–100)
GLUCOSE SERPL-MCNC: 147 MG/DL (ref 65–100)
HCO3 BLD-SCNC: 24.8 MMOL/L (ref 22–26)
HCT VFR BLD AUTO: 27.4 % (ref 35–47)
HGB BLD-MCNC: 8.7 G/DL (ref 11.5–16)
MCH RBC QN AUTO: 26 PG (ref 26–34)
MCHC RBC AUTO-ENTMCNC: 31.8 G/DL (ref 30–36.5)
MCV RBC AUTO: 82 FL (ref 80–99)
NRBC # BLD: 0 K/UL (ref 0–0.01)
NRBC BLD-RTO: 0 PER 100 WBC
P-R INTERVAL, ECG05: 138 MS
PCO2 BLD: 30.6 MMHG (ref 35–45)
PH BLD: 7.52 [PH] (ref 7.35–7.45)
PLATELET # BLD AUTO: 380 K/UL (ref 150–400)
PMV BLD AUTO: 9.7 FL (ref 8.9–12.9)
PO2 BLD: 54 MMHG (ref 80–100)
POTASSIUM SERPL-SCNC: 4 MMOL/L (ref 3.5–5.1)
Q-T INTERVAL, ECG07: 486 MS
QRS DURATION, ECG06: 74 MS
QTC CALCULATION (BEZET), ECG08: 456 MS
RBC # BLD AUTO: 3.34 M/UL (ref 3.8–5.2)
SAO2 % BLD: 91 % (ref 92–97)
SERVICE CMNT-IMP: ABNORMAL
SODIUM SERPL-SCNC: 139 MMOL/L (ref 136–145)
SPECIMEN TYPE: ABNORMAL
VENTRICULAR RATE, ECG03: 53 BPM
WBC # BLD AUTO: 12.4 K/UL (ref 3.6–11)

## 2018-08-26 PROCEDURE — 85027 COMPLETE CBC AUTOMATED: CPT | Performed by: HOSPITALIST

## 2018-08-26 PROCEDURE — 82962 GLUCOSE BLOOD TEST: CPT

## 2018-08-26 PROCEDURE — 74011000250 HC RX REV CODE- 250: Performed by: HOSPITALIST

## 2018-08-26 PROCEDURE — 74011250637 HC RX REV CODE- 250/637: Performed by: NURSE PRACTITIONER

## 2018-08-26 PROCEDURE — 77010033678 HC OXYGEN DAILY

## 2018-08-26 PROCEDURE — 74011250637 HC RX REV CODE- 250/637: Performed by: HOSPITALIST

## 2018-08-26 PROCEDURE — 36415 COLL VENOUS BLD VENIPUNCTURE: CPT | Performed by: HOSPITALIST

## 2018-08-26 PROCEDURE — 77030029684 HC NEB SM VOL KT MONA -A

## 2018-08-26 PROCEDURE — 74011250636 HC RX REV CODE- 250/636: Performed by: HOSPITALIST

## 2018-08-26 PROCEDURE — 94640 AIRWAY INHALATION TREATMENT: CPT

## 2018-08-26 PROCEDURE — 80048 BASIC METABOLIC PNL TOTAL CA: CPT | Performed by: HOSPITALIST

## 2018-08-26 PROCEDURE — 36600 WITHDRAWAL OF ARTERIAL BLOOD: CPT

## 2018-08-26 PROCEDURE — 82803 BLOOD GASES ANY COMBINATION: CPT

## 2018-08-26 PROCEDURE — 65270000029 HC RM PRIVATE

## 2018-08-26 PROCEDURE — 93306 TTE W/DOPPLER COMPLETE: CPT

## 2018-08-26 PROCEDURE — 77030027138 HC INCENT SPIROMETER -A

## 2018-08-26 PROCEDURE — 94760 N-INVAS EAR/PLS OXIMETRY 1: CPT

## 2018-08-26 RX ORDER — AMLODIPINE BESYLATE 5 MG/1
5 TABLET ORAL DAILY
Status: DISCONTINUED | OUTPATIENT
Start: 2018-08-27 | End: 2018-08-30

## 2018-08-26 RX ORDER — ACYCLOVIR 200 MG/1
400 CAPSULE ORAL 2 TIMES DAILY
Status: DISCONTINUED | OUTPATIENT
Start: 2018-08-26 | End: 2018-08-31 | Stop reason: HOSPADM

## 2018-08-26 RX ORDER — ACYCLOVIR 400 MG/1
400 TABLET ORAL 2 TIMES DAILY
COMMUNITY

## 2018-08-26 RX ORDER — HYDROMORPHONE HYDROCHLORIDE 2 MG/1
2 TABLET ORAL
COMMUNITY
End: 2018-10-11

## 2018-08-26 RX ORDER — MINOCYCLINE HYDROCHLORIDE 50 MG/1
100 CAPSULE ORAL 2 TIMES DAILY
Status: DISCONTINUED | OUTPATIENT
Start: 2018-08-26 | End: 2018-08-31 | Stop reason: HOSPADM

## 2018-08-26 RX ORDER — HYDROMORPHONE HYDROCHLORIDE 2 MG/1
2 TABLET ORAL
Status: DISCONTINUED | OUTPATIENT
Start: 2018-08-26 | End: 2018-08-31 | Stop reason: HOSPADM

## 2018-08-26 RX ORDER — OXYCODONE HCL 10 MG/1
20 TABLET, FILM COATED, EXTENDED RELEASE ORAL EVERY 12 HOURS
Status: DISCONTINUED | OUTPATIENT
Start: 2018-08-26 | End: 2018-08-31 | Stop reason: HOSPADM

## 2018-08-26 RX ORDER — MINOCYCLINE HYDROCHLORIDE 100 MG/1
100 TABLET ORAL 2 TIMES DAILY
COMMUNITY
End: 2018-09-26

## 2018-08-26 RX ORDER — ONDANSETRON 2 MG/ML
4 INJECTION INTRAMUSCULAR; INTRAVENOUS
Status: DISCONTINUED | OUTPATIENT
Start: 2018-08-26 | End: 2018-08-29

## 2018-08-26 RX ORDER — OXYCODONE HCL 20 MG/1
20 TABLET, FILM COATED, EXTENDED RELEASE ORAL EVERY 12 HOURS
Status: ON HOLD | COMMUNITY
Start: 2018-08-26 | End: 2018-10-11

## 2018-08-26 RX ORDER — POLYETHYLENE GLYCOL 3350 17 G/17G
17 POWDER, FOR SOLUTION ORAL DAILY
Status: DISCONTINUED | OUTPATIENT
Start: 2018-08-26 | End: 2018-08-29

## 2018-08-26 RX ADMIN — HYDROMORPHONE HYDROCHLORIDE 2 MG: 2 TABLET ORAL at 18:12

## 2018-08-26 RX ADMIN — ONDANSETRON 4 MG: 2 INJECTION INTRAMUSCULAR; INTRAVENOUS at 16:43

## 2018-08-26 RX ADMIN — IPRATROPIUM BROMIDE AND ALBUTEROL SULFATE 3 ML: .5; 3 SOLUTION RESPIRATORY (INHALATION) at 02:24

## 2018-08-26 RX ADMIN — IPRATROPIUM BROMIDE AND ALBUTEROL SULFATE 3 ML: .5; 3 SOLUTION RESPIRATORY (INHALATION) at 08:37

## 2018-08-26 RX ADMIN — HYDROMORPHONE HYDROCHLORIDE 2 MG: 2 TABLET ORAL at 10:05

## 2018-08-26 RX ADMIN — ONDANSETRON 4 MG: 2 INJECTION INTRAMUSCULAR; INTRAVENOUS at 22:18

## 2018-08-26 RX ADMIN — Medication 10 ML: at 07:16

## 2018-08-26 RX ADMIN — HYDROMORPHONE HYDROCHLORIDE 2 MG: 2 TABLET ORAL at 21:57

## 2018-08-26 RX ADMIN — Medication 10 ML: at 22:06

## 2018-08-26 RX ADMIN — IPRATROPIUM BROMIDE AND ALBUTEROL SULFATE 3 ML: .5; 3 SOLUTION RESPIRATORY (INHALATION) at 20:28

## 2018-08-26 RX ADMIN — IPRATROPIUM BROMIDE AND ALBUTEROL SULFATE 3 ML: .5; 3 SOLUTION RESPIRATORY (INHALATION) at 13:17

## 2018-08-26 RX ADMIN — Medication 81 MG: at 09:12

## 2018-08-26 RX ADMIN — ACYCLOVIR 400 MG: 200 CAPSULE ORAL at 21:57

## 2018-08-26 RX ADMIN — HYDROMORPHONE HYDROCHLORIDE 2 MG: 2 TABLET ORAL at 06:22

## 2018-08-26 RX ADMIN — ACYCLOVIR 400 MG: 200 CAPSULE ORAL at 11:18

## 2018-08-26 RX ADMIN — Medication 10 ML: at 01:58

## 2018-08-26 RX ADMIN — ENOXAPARIN SODIUM 40 MG: 40 INJECTION, SOLUTION INTRAVENOUS; SUBCUTANEOUS at 01:58

## 2018-08-26 RX ADMIN — MINOCYCLINE HYDROCHLORIDE 100 MG: 50 CAPSULE ORAL at 12:42

## 2018-08-26 RX ADMIN — AMLODIPINE BESYLATE 2.5 MG: 5 TABLET ORAL at 09:12

## 2018-08-26 RX ADMIN — OXYCODONE HYDROCHLORIDE 20 MG: 10 TABLET, FILM COATED, EXTENDED RELEASE ORAL at 18:51

## 2018-08-26 RX ADMIN — HYDROMORPHONE HYDROCHLORIDE 2 MG: 2 TABLET ORAL at 14:03

## 2018-08-26 RX ADMIN — OXYCODONE HYDROCHLORIDE 20 MG: 10 TABLET, FILM COATED, EXTENDED RELEASE ORAL at 07:16

## 2018-08-26 RX ADMIN — MINOCYCLINE HYDROCHLORIDE 100 MG: 50 CAPSULE ORAL at 21:57

## 2018-08-26 RX ADMIN — POLYETHYLENE GLYCOL 3350 17 G: 17 POWDER, FOR SOLUTION ORAL at 11:16

## 2018-08-26 RX ADMIN — HYDROMORPHONE HYDROCHLORIDE 2 MG: 2 TABLET ORAL at 01:57

## 2018-08-26 RX ADMIN — Medication 10 ML: at 14:03

## 2018-08-26 RX ADMIN — LEVOFLOXACIN 500 MG: 5 INJECTION, SOLUTION INTRAVENOUS at 01:58

## 2018-08-26 RX ADMIN — FOLIC ACID 1 MG: 1 TABLET ORAL at 09:13

## 2018-08-26 NOTE — PROGRESS NOTES
Problem: Falls - Risk of  Goal: *Absence of Falls  Document Wendie Fall Risk and appropriate interventions in the flowsheet.    Outcome: Progressing Towards Goal  Fall Risk Interventions:            Medication Interventions: Patient to call before getting OOB

## 2018-08-26 NOTE — H&P
[address] [facility]  HISTORY AND PHYSICAL      Name:FRANCES TYSON  MR#: [id number]  : 1955  ACCOUNT #: [de-identified]   ADMIT DATE: 2018    PRIMARY CARE PHYSICIAN:  Vishal Austin MD    ONCOLOGIST:  Yolette Gutierrez MD    PRESENTING COMPLAINT:  Shortness of breath. HISTORY OF PRESENT ILLNESS:  The patient is a 69-year-old [place] American female who has been diagnosed with metastatic lung cancer in 10/2017. She started Avastin and  yesterday. According to the daughter and son, she had been short of breath for a while; however, today, her shortness of breath became worse, so she called her oncologist who recommended that the patient should go to ER to rule out pulmonary embolism. In ER, a CTA was done, which showed progression of disease with multiple pulmonary nodules; however, no PE was noticed. The patient desaturates easily when she goes to bathroom. Her pulse ox went down to 88%. She has a chronic cough which has not changed. She has no fever or chills. She denies having any orthopnea, but states that she has noticed some leg swelling. She sleeps on 1 pillow. PAST MEDICAL HISTORY:  Significant for:   1. Lupus. 2.  Metastatic lung cancer. 3.  History of GERD. 4.  Hypertension. SOCIOECONOMIC HISTORY:  The patient is . She does not smoke. She drinks socially. She lives at home with her . CODE STATUS:  FULL CODE. FAMILY HISTORY:  Significant for colon cancer in mother. Dad  of heart attack. ALLERGIES:  NO KNOWN DRUG ALLERGIES. MEDICATIONS:  Prior to admission are not clear; however, a list from  showed that she was on amitriptyline, amlodipine, aspirin and omeprazole. REVIEW OF SYSTEMS:  Negative except as mentioned in history of presenting illness. All systems reviewed and no other positive findings were noticed. PHYSICAL EXAMINATION:  GENERAL:  The patient is a 69-year-old female, not in any acute distress.   VITAL SIGNS: Temperature 97.6, blood pressure 180/80, pulse is 58, respiratory rate is 26, saturation 96%. HEENT:  Reveals pupils equally reacting to light and accommodation. NECK:  Supple. There is no lymphadenopathy or JVD. CHEST:  Reveals no wheezing. Good air entry. HEART:  S1 and S2 regular, no murmur, no S3.  ABDOMEN:  Reveals no tenderness. No guarding or rigidity. Bowel sounds are active. EXTREMITIES:  1+ pedal edema bilaterally. CENTRAL NERVOUS SYSTEM:  The patient is alert, oriented, has normal strength and reflexes. Plantars downgoing. Cranial nerves are normal.  PSYCHIATRIC:  Unremarkable. SKIN:  Unremarkable. LABORATORY DATA:  Lab work done in the emergency room revealed white count of 13.3, hemoglobin is 9.4, hematocrit 30.7, MCV is 83.4, platelet count is 001,445 and 91% segs, 4% lymphs. Her proBNP is 1463. Her chemistries reveal sodium 137, potassium 4.1, chloride 105, bicarbonate 24, gap of 8, glucose 168, BUN is 22, creatinine 0.93, calcium is 8.1, bilirubin 0.4, protein 9.3, albumin is 2.4, globulin 6.9, ALT is 20, AST is 28, alkaline phosphatase is 104, troponin I is less than 0.05. Imaging included a CTA of the chest which reveals that the patient has no pulmonary embolism. She has progression of disease with multiple pulmonary nodules. There is a liver lesion which is not well visualized. The largest nodule in the left lower lobe measures approximately 2.8 x 1.4 cm in size. EKG was done which showed sinus bradycardia with a ventricular rate of 53 beats per minute, no acute ischemic changes are seen on this EKG. ASSESSMENT AND PLAN:      The patient is a 57-year-old female with metastatic lung cancer who is being admitted for further evaluation of:    1. Acute on chronic shortness of breath. Exact etiology is not clear; however, since the patient has a slightly elevated white count and is currently on chemotherapy, we will add antibiotics.   Her CT scan does not show any acute infiltrate. 2.  We will start her on  breathing treatment. 3.  Obtain ABG. 4.  Elevated BNP. I will also order an echocardiogram and give her a dose of Lasix. 5.  Obtain home medications. 6.  Consult oncology. 7.  Anemia due to metastatic cancer. 8.  Deep venous thrombosis prophylaxis.       MD TAPAN Cortes/JOEL  D: 08/25/2018 21:37     T: 08/26/2018 09:43  JOB #: [id number]

## 2018-08-26 NOTE — PROGRESS NOTES
TRANSFER - IN REPORT:    Verbal report received from [facility] RN(name) on Mima Guo  being received from ED(unit) for routine progression of care      Report consisted of patients Situation, Background, Assessment and   Recommendations(SBAR). Information from the following report(s) SBAR, Kardex, ED Summary, [facility] and Recent Results was reviewed with the receiving nurse. Opportunity for questions and clarification was provided. Assessment completed upon patients arrival to unit and care assumed.

## 2018-08-26 NOTE — PROGRESS NOTES
Bedside and Verbal shift change report given to hCucho Ríos (oncoming nurse) by Phyllis Munoz (offgoing nurse). Report included the following information SBAR, Kardex, MAR and Recent Results.

## 2018-08-26 NOTE — ED NOTES
TRANSFER - OUT REPORT:    Verbal report given to OSCAR sainz (name) on Mima Guo  being transferred to 6E(unit) for routine progression of care       Report consisted of patients Situation, Background, Assessment and   Recommendations(SBAR). Information from the following report(s) SBAR, ED Summary, Intake/Output, MAR and Recent Results was reviewed with the receiving nurse. Lines:   Venous Access Device PORT, NOT ACCESSED 08/25/18 Upper chest (subclavicular area, right (Active)       Peripheral IV 08/25/18 Left Antecubital (Active)        Opportunity for questions and clarification was provided.       Patient transported with:   [id number]

## 2018-08-26 NOTE — ED NOTES
Patient transported to [address] by RN. Morales Mcdowell RN notified patient on unit. Patient ambulatory to bathroom.

## 2018-08-26 NOTE — PROGRESS NOTES
Hospitalist Progress Note  Troy Diaz MD  Answering service: [phone] OR [phone] from in house phone  Cell: [phone]      Date of Service:  2018  NAME:  Wanda Aguilar  :  1955  MRN:  [id number]      Admission Summary:   The patient is a 79-year-old  female who has been diagnosed with metastatic lung cancer in 10/2017. She started Avastin last Friday. According to the daughter and son, she had been short of breath for a while; however, today, her shortness of breath became worse, so she called her oncologist who recommended that the patient should go to ER to rule out pulmonary embolism. In ER, a CTA was done, which showed progression of disease with multiple pulmonary nodules; however, no PE was noticed. The patient desaturates easily when she goes to bathroom. Her pulse ox went down to 88%. She has a chronic cough which has not changed. She has no fever or chills. She denies having any orthopnea, but states that she has noticed some leg swelling. She sleeps on 1 pillow. Interval history / Subjective:     F/u      Assessment & Plan:     SOB/Acute hypoxic respiratory failure  -likely sec to chemotherapy, started last Friday  -Wean off oxygen to keep sat>92%  -Incentive spirometry    Metastatic lung cancer  -CT chest  Progression of disease with multiple pulmonary nodules.   -diagnosed in  s/p chemotherapy s/p radiation therapy  -Recently (yesterday) started on chemotherapy (infusion)  -On LVQ, not sure if the patient really needs it    HTN  -restart amlodipine    Lupus  -restart home meds    EKG changes  -TWI in inferior leads are not new  -Follow Echo    Diabetic diet, will switch to regular diet    Code status: FULL CODE  DVT prophylaxis: scd  PTA: home    Plan: Follow Oncology    Care Plan discussed with: Patient/Family  Disposition: TBD     Hospital Problems  Date Reviewed: 2018 Codes Class Noted POA    * (Principal)Shortness of breath ICD-10-CM: R06.02  ICD-9-CM: 786.05  8/25/2018 Unknown                Review of Systems:   A comprehensive review of systems was negative except for that written in the HPI. Vital Signs:    Last 24hrs VS reviewed since prior progress note. Most recent are:  Visit Vitals    /83 (BP 1 Location: Right arm, BP Patient Position: At rest)    Pulse (!) 59    Temp 98.1 °F (36.7 °C)    Resp 18    Ht 5' 2.5\" (1.588 m)    Wt 66.9 kg (147 lb 8 oz)    SpO2 98%    Breastfeeding No    BMI 26.55 kg/m2       No intake or output data in the 24 hours ending 08/26/18 1048     Physical Examination:             Constitutional:  No acute distress, cooperative, pleasant    ENT:  Oral mucous moist, oropharynx benign. Neck supple,    Resp:  CTA bilaterally. No wheezing/rhonchi/rales. No accessory muscle use   CV:  Regular rhythm, normal rate, no murmurs, gallops, rubs    GI:  Soft, non distended, non tender. normoactive bowel sounds, no hepatosplenomegaly     Musculoskeletal:  No edema, warm, 2+ pulses throughout    Neurologic:  Moves all extremities. AAOx3, CN II-XII reviewed     Skin:  Good turgor, no rashes or ulcers       Data Review:    Review and/or order of clinical lab test      Labs:     Recent Labs      08/26/18   0206  08/25/18 1837   WBC  12.4*  13.3*   HGB  8.7*  9.4*   HCT  27.4*  30.7*   PLT  380  441*     Recent Labs      08/26/18   0206  08/25/18   1837   NA  139  137   K  4.0  4.1   CL  105  105   CO2  25  24   BUN  24*  22*   CREA  0.83  0.93   GLU  147*  168*   CA  7.7*  8.1*     Recent Labs      08/25/18 1837   SGOT  28   ALT  20   AP  104   TBILI  0.4   TP  9.3*   ALB  2.4*   GLOB  6.9*     No results for input(s): INR, PTP, APTT in the last 72 hours. No lab exists for component: INREXT   No results for input(s): FE, TIBC, PSAT, FERR in the last 72 hours.    No results found for: FOL, RBCF   No results for input(s): PH, PCO2, PO2 in the last 72 hours.   Recent Labs      08/25/18   1837   TROIQ  <0.05     No results found for: CHOL, CHOLX, CHLST, CHOLV, HDL, LDL, LDLC, DLDLP, TGLX, TRIGL, TRIGP, CHHD, CHHDX  Lab Results   Component Value Date/Time    Glucose (POC) 138 (H) 08/26/2018 06:34 AM    Glucose (POC) 177 (H) 08/26/2018 01:29 AM     Lab Results   Component Value Date/Time    Color YELLOW/STRAW 08/25/2018 08:29 PM    Appearance CLEAR 08/25/2018 08:29 PM    Specific gravity 1.021 08/25/2018 08:29 PM    pH (UA) 7.0 08/25/2018 08:29 PM    Protein NEGATIVE  08/25/2018 08:29 PM    Glucose NEGATIVE  08/25/2018 08:29 PM    Ketone NEGATIVE  08/25/2018 08:29 PM    Bilirubin NEGATIVE  08/25/2018 08:29 PM    Urobilinogen 0.2 08/25/2018 08:29 PM    Nitrites NEGATIVE  08/25/2018 08:29 PM    Leukocyte Esterase NEGATIVE  08/25/2018 08:29 PM    Epithelial cells FEW 08/25/2018 08:29 PM    Bacteria NEGATIVE  08/25/2018 08:29 PM    WBC 0-4 08/25/2018 08:29 PM    RBC 0-5 08/25/2018 08:29 PM         Medications Reviewed:     Current Facility-Administered Medications   Medication Dose Route Frequency    oxyCODONE ER (OxyCONTIN) tablet 20 mg  20 mg Oral Q12H    HYDROmorphone (DILAUDID) tablet 2 mg  2 mg Oral Q4H PRN    polyethylene glycol (MIRALAX) packet 17 g  17 g Oral DAILY    [START ON 8/27/2018] amLODIPine (NORVASC) tablet 5 mg  5 mg Oral DAILY    minocycline (MINOCIN, DYNACIN) capsule 100 mg  100 mg Oral BID    acyclovir (ZOVIRAX) capsule 400 mg  400 mg Oral BID    aspirin delayed-release tablet 81 mg  81 mg Oral DAILY    HYDROcodone-acetaminophen (NORCO)  mg tablet 2 Tab  2 Tab Oral Q6H PRN    albuterol-ipratropium (DUO-NEB) 2.5 MG-0.5 MG/3 ML  3 mL Nebulization Q6H RT    levoFLOXacin (LEVAQUIN) 500 mg in D5W IVPB  500 mg IntraVENous Q24H    sodium chloride (NS) flush 5-10 mL  5-10 mL IntraVENous Q8H    sodium chloride (NS) flush 5-10 mL  5-10 mL IntraVENous PRN    enoxaparin (LOVENOX) injection 40 mg  40 mg SubCUTAneous O27L    folic acid (FOLVITE) tablet 1 mg  1 mg Oral DAILY     ______________________________________________________________________  EXPECTED LENGTH OF STAY: - - -  ACTUAL LENGTH OF STAY:          Macie Escamilla MD

## 2018-08-27 LAB
GLUCOSE BLD STRIP.AUTO-MCNC: 112 MG/DL (ref 65–100)
GLUCOSE BLD STRIP.AUTO-MCNC: 120 MG/DL (ref 65–100)
GLUCOSE BLD STRIP.AUTO-MCNC: 128 MG/DL (ref 65–100)
GLUCOSE BLD STRIP.AUTO-MCNC: 140 MG/DL (ref 65–100)
SERVICE CMNT-IMP: ABNORMAL

## 2018-08-27 PROCEDURE — 74011000250 HC RX REV CODE- 250: Performed by: HOSPITALIST

## 2018-08-27 PROCEDURE — 74011250636 HC RX REV CODE- 250/636: Performed by: HOSPITALIST

## 2018-08-27 PROCEDURE — 94640 AIRWAY INHALATION TREATMENT: CPT

## 2018-08-27 PROCEDURE — 77010033678 HC OXYGEN DAILY

## 2018-08-27 PROCEDURE — 74011250636 HC RX REV CODE- 250/636: Performed by: INTERNAL MEDICINE

## 2018-08-27 PROCEDURE — 94760 N-INVAS EAR/PLS OXIMETRY 1: CPT

## 2018-08-27 PROCEDURE — 94664 DEMO&/EVAL PT USE INHALER: CPT

## 2018-08-27 PROCEDURE — 74011250637 HC RX REV CODE- 250/637: Performed by: INTERNAL MEDICINE

## 2018-08-27 PROCEDURE — 74011250637 HC RX REV CODE- 250/637: Performed by: HOSPITALIST

## 2018-08-27 PROCEDURE — 77030029684 HC NEB SM VOL KT MONA -A

## 2018-08-27 PROCEDURE — 74011250637 HC RX REV CODE- 250/637: Performed by: NURSE PRACTITIONER

## 2018-08-27 PROCEDURE — 65270000029 HC RM PRIVATE

## 2018-08-27 PROCEDURE — 82962 GLUCOSE BLOOD TEST: CPT

## 2018-08-27 RX ORDER — SODIUM CHLORIDE 9 MG/ML
75 INJECTION, SOLUTION INTRAVENOUS CONTINUOUS
Status: DISCONTINUED | OUTPATIENT
Start: 2018-08-27 | End: 2018-08-30

## 2018-08-27 RX ORDER — MIRTAZAPINE 15 MG/1
15 TABLET, ORALLY DISINTEGRATING ORAL
Status: DISCONTINUED | OUTPATIENT
Start: 2018-08-27 | End: 2018-08-31 | Stop reason: HOSPADM

## 2018-08-27 RX ADMIN — IPRATROPIUM BROMIDE AND ALBUTEROL SULFATE 3 ML: .5; 3 SOLUTION RESPIRATORY (INHALATION) at 13:55

## 2018-08-27 RX ADMIN — MINOCYCLINE HYDROCHLORIDE 100 MG: 50 CAPSULE ORAL at 09:30

## 2018-08-27 RX ADMIN — IPRATROPIUM BROMIDE AND ALBUTEROL SULFATE 3 ML: .5; 3 SOLUTION RESPIRATORY (INHALATION) at 07:32

## 2018-08-27 RX ADMIN — Medication 10 ML: at 23:08

## 2018-08-27 RX ADMIN — MIRTAZAPINE 15 MG: 15 TABLET, ORALLY DISINTEGRATING ORAL at 23:08

## 2018-08-27 RX ADMIN — ONDANSETRON 4 MG: 2 INJECTION INTRAMUSCULAR; INTRAVENOUS at 12:30

## 2018-08-27 RX ADMIN — SODIUM CHLORIDE 75 ML/HR: 900 INJECTION, SOLUTION INTRAVENOUS at 21:59

## 2018-08-27 RX ADMIN — ACYCLOVIR 400 MG: 200 CAPSULE ORAL at 09:29

## 2018-08-27 RX ADMIN — Medication 10 ML: at 02:00

## 2018-08-27 RX ADMIN — HYDROMORPHONE HYDROCHLORIDE 2 MG: 2 TABLET ORAL at 14:32

## 2018-08-27 RX ADMIN — OXYCODONE HYDROCHLORIDE 20 MG: 10 TABLET, FILM COATED, EXTENDED RELEASE ORAL at 07:34

## 2018-08-27 RX ADMIN — HYDROMORPHONE HYDROCHLORIDE 2 MG: 2 TABLET ORAL at 18:22

## 2018-08-27 RX ADMIN — ENOXAPARIN SODIUM 40 MG: 40 INJECTION, SOLUTION INTRAVENOUS; SUBCUTANEOUS at 01:56

## 2018-08-27 RX ADMIN — OXYCODONE HYDROCHLORIDE 20 MG: 10 TABLET, FILM COATED, EXTENDED RELEASE ORAL at 19:33

## 2018-08-27 RX ADMIN — ENOXAPARIN SODIUM 40 MG: 40 INJECTION, SOLUTION INTRAVENOUS; SUBCUTANEOUS at 23:08

## 2018-08-27 RX ADMIN — Medication 81 MG: at 09:29

## 2018-08-27 RX ADMIN — Medication 10 ML: at 12:30

## 2018-08-27 RX ADMIN — FOLIC ACID 1 MG: 1 TABLET ORAL at 09:29

## 2018-08-27 RX ADMIN — MINOCYCLINE HYDROCHLORIDE 100 MG: 50 CAPSULE ORAL at 18:21

## 2018-08-27 RX ADMIN — AMLODIPINE BESYLATE 5 MG: 5 TABLET ORAL at 09:29

## 2018-08-27 RX ADMIN — LEVOFLOXACIN 500 MG: 5 INJECTION, SOLUTION INTRAVENOUS at 01:56

## 2018-08-27 RX ADMIN — HYDROMORPHONE HYDROCHLORIDE 2 MG: 2 TABLET ORAL at 06:13

## 2018-08-27 RX ADMIN — IPRATROPIUM BROMIDE AND ALBUTEROL SULFATE 3 ML: .5; 3 SOLUTION RESPIRATORY (INHALATION) at 02:15

## 2018-08-27 RX ADMIN — HYDROMORPHONE HYDROCHLORIDE 2 MG: 2 TABLET ORAL at 01:56

## 2018-08-27 RX ADMIN — IPRATROPIUM BROMIDE AND ALBUTEROL SULFATE 3 ML: .5; 3 SOLUTION RESPIRATORY (INHALATION) at 20:17

## 2018-08-27 RX ADMIN — LEVOFLOXACIN 500 MG: 5 INJECTION, SOLUTION INTRAVENOUS at 23:09

## 2018-08-27 RX ADMIN — POLYETHYLENE GLYCOL 3350 17 G: 17 POWDER, FOR SOLUTION ORAL at 09:26

## 2018-08-27 RX ADMIN — ACYCLOVIR 400 MG: 200 CAPSULE ORAL at 18:21

## 2018-08-27 NOTE — PROGRESS NOTES
Bedside shift change report given to Shazia Olson (oncoming nurse) by Murtaza Martin (offgoing nurse). Report included the following information SBAR.

## 2018-08-27 NOTE — PROGRESS NOTES
Problem: Constipation - Risk of  Goal: *Prevention of constipation  Outcome: Not Progressing Towards Goal  Patient given Miralex earlier and given warm prune juice,will monitor.

## 2018-08-27 NOTE — CDMP QUERY
There is noted documentation of  \"SOB/Acute hypoxic respiratory failure  -likely sec to chemotherapy, started last Friday\"   on this record. Could this be further clarified as    =>Fluid Overload POA in the setting of recent chemotherapy resulting in Respiratory Failure requiring lasix/ABGs/oxygen @ 3 lts  =>Other Explanation of clinical findings  =>Clinically Undetermined (no explanation for clinical findings)    The medical record reflects the following clinical findings, treatment, and risk factors:    Risk Factors: lung CA  Clinical Indicators: SOB/Acute hypoxic respiratory failure  -likely sec to chemotherapy, started last Friday  resp rate 27-24  ED note-Patient ambulated to bathroom, became short of breath and weak, O2 88% upon return to bed. Treatment: supplemental oxygen @ 3 lts/min, lasix 40 IV, ABGs      Please clarify and document your clinical opinion in the progress notes and discharge summary including the definitive and/or presumptive diagnosis, (suspected or probable), related to the above clinical findings.  Please include clinical findings supporting your diagnosis    Thank you,         Yael Egan [facility]

## 2018-08-27 NOTE — PROGRESS NOTES
Bedside shift change report given to tamara (oncoming nurse) by Domenico Raines (offgoing nurse). Report included the following information SBAR, Kardex, MAR and Recent Results.    2145 c/o dizziness--patient has not been eating or drinking-- family concerned--dr ornelas pagenel awaiting call back-- no significant harris in ortostatic blood pressures

## 2018-08-27 NOTE — PROGRESS NOTES
Hospitalist Progress Note  Felton Joshi MD  Answering service: [phone] OR [phone] from in house phone  Cell: [phone]      Date of Service:  2018  NAME:  Eamon Boo  :  1955  MRN:  [id number]      Admission Summary:   The patient is a 69-year-old  female who has been diagnosed with metastatic lung cancer in 10/2017. She started Avastin last Friday. According to the daughter and son, she had been short of breath for a while; however, today, her shortness of breath became worse, so she called her oncologist who recommended that the patient should go to ER to rule out pulmonary embolism. In ER, a CTA was done, which showed progression of disease with multiple pulmonary nodules; however, no PE was noticed. The patient desaturates easily when she goes to bathroom. Her pulse ox went down to 88%. She has a chronic cough which has not changed. She has no fever or chills. She denies having any orthopnea, but states that she has noticed some leg swelling. She sleeps on 1 pillow. Interval history / Subjective:     F/u respiratory distress     Assessment & Plan:     SOB/Acute hypoxic respiratory failure  -likely sec to chemotherapy, started last Friday  -Wean off oxygen to keep sat>92%  -Incentive spirometry  -?steroids    Metastatic lung cancer  -CT chest  Progression of disease with multiple pulmonary nodules.   -diagnosed in  s/p chemotherapy s/p radiation therapy  -Recently (yesterday) started on chemotherapy (infusion)  -On LVQ, not sure if the patient really needs it    Anemia  -sec to ?chemotherapy  -Follow work up  -monitor    HTN  -restart amlodipine    Lupus  -restart home meds    EKG changes  -TWI in inferior leads are not new  -Echo with EF 55-6-% with no RWMA    Regular diet    Code status: FULL CODE  DVT prophylaxis: scd  PTA: home    Plan: Follow Oncology    Care Plan discussed with: Patient/Family  Disposition: TBD     Hospital Problems  Date Reviewed: 8/25/2018          Codes Class Noted POA    * (Principal)Shortness of breath ICD-10-CM: R06.02  ICD-9-CM: 786.05  8/25/2018 Unknown                Review of Systems:   A comprehensive review of systems was negative except for that written in the HPI. Vital Signs:    Last 24hrs VS reviewed since prior progress note. Most recent are:  Visit Vitals    /87 (BP 1 Location: Left arm, BP Patient Position: At rest)    Pulse 71    Temp 99 °F (37.2 °C)    Resp 16    Ht 5' 2.5\" (1.588 m)    Wt 66.9 kg (147 lb 8 oz)    SpO2 98%    Breastfeeding No    BMI 26.55 kg/m2       No intake or output data in the 24 hours ending 08/27/18 0859     Physical Examination:             Constitutional:  No acute distress, cooperative, pleasant    ENT:  Oral mucous moist, oropharynx benign. Neck supple,    Resp:  CTA bilaterally. No wheezing/rhonchi/rales. No accessory muscle use   CV:  Regular rhythm, normal rate, no murmurs, gallops, rubs    GI:  Soft, non distended, non tender. normoactive bowel sounds, no hepatosplenomegaly     Musculoskeletal:  No edema, warm, 2+ pulses throughout    Neurologic:  Moves all extremities. AAOx3, CN II-XII reviewed     Skin:  Good turgor, no rashes or ulcers       Data Review:    Review and/or order of clinical lab test      Labs:     Recent Labs      08/26/18   0206  08/25/18 1837   WBC  12.4*  13.3*   HGB  8.7*  9.4*   HCT  27.4*  30.7*   PLT  380  441*     Recent Labs      08/26/18   0206  08/25/18   1837   NA  139  137   K  4.0  4.1   CL  105  105   CO2  25  24   BUN  24*  22*   CREA  0.83  0.93   GLU  147*  168*   CA  7.7*  8.1*     Recent Labs      08/25/18 1837   SGOT  28   ALT  20   AP  104   TBILI  0.4   TP  9.3*   ALB  2.4*   GLOB  6.9*     No results for input(s): INR, PTP, APTT in the last 72 hours.     No lab exists for component: INREXT, INREXT   No results for input(s): FE, TIBC, PSAT, FERR in the last 72 hours. No results found for: FOL, RBCF   No results for input(s): PH, PCO2, PO2 in the last 72 hours.   Recent Labs      08/25/18   1837   TROIQ  <0.05     No results found for: CHOL, CHOLX, CHLST, CHOLV, HDL, LDL, LDLC, DLDLP, TGLX, TRIGL, TRIGP, CHHD, CHHDX  Lab Results   Component Value Date/Time    Glucose (POC) 112 (H) 08/27/2018 06:46 AM    Glucose (POC) 123 (H) 08/26/2018 08:59 PM    Glucose (POC) 126 (H) 08/26/2018 05:00 PM    Glucose (POC) 136 (H) 08/26/2018 11:19 AM    Glucose (POC) 138 (H) 08/26/2018 06:34 AM     Lab Results   Component Value Date/Time    Color YELLOW/STRAW 08/25/2018 08:29 PM    Appearance CLEAR 08/25/2018 08:29 PM    Specific gravity 1.021 08/25/2018 08:29 PM    pH (UA) 7.0 08/25/2018 08:29 PM    Protein NEGATIVE  08/25/2018 08:29 PM    Glucose NEGATIVE  08/25/2018 08:29 PM    Ketone NEGATIVE  08/25/2018 08:29 PM    Bilirubin NEGATIVE  08/25/2018 08:29 PM    Urobilinogen 0.2 08/25/2018 08:29 PM    Nitrites NEGATIVE  08/25/2018 08:29 PM    Leukocyte Esterase NEGATIVE  08/25/2018 08:29 PM    Epithelial cells FEW 08/25/2018 08:29 PM    Bacteria NEGATIVE  08/25/2018 08:29 PM    WBC 0-4 08/25/2018 08:29 PM    RBC 0-5 08/25/2018 08:29 PM         Medications Reviewed:     Current Facility-Administered Medications   Medication Dose Route Frequency    oxyCODONE ER (OxyCONTIN) tablet 20 mg  20 mg Oral Q12H    HYDROmorphone (DILAUDID) tablet 2 mg  2 mg Oral Q4H PRN    polyethylene glycol (MIRALAX) packet 17 g  17 g Oral DAILY    amLODIPine (NORVASC) tablet 5 mg  5 mg Oral DAILY    minocycline (MINOCIN, DYNACIN) capsule 100 mg  100 mg Oral BID    acyclovir (ZOVIRAX) capsule 400 mg  400 mg Oral BID    ondansetron (ZOFRAN) injection 4 mg  4 mg IntraVENous Q8H PRN    aspirin delayed-release tablet 81 mg  81 mg Oral DAILY    HYDROcodone-acetaminophen (NORCO)  mg tablet 2 Tab  2 Tab Oral Q6H PRN    albuterol-ipratropium (DUO-NEB) 2.5 MG-0.5 MG/3 ML  3 mL Nebulization Q6H RT    levoFLOXacin (LEVAQUIN) 500 mg in D5W IVPB  500 mg IntraVENous Q24H    sodium chloride (NS) flush 5-10 mL  5-10 mL IntraVENous Q8H    sodium chloride (NS) flush 5-10 mL  5-10 mL IntraVENous PRN    enoxaparin (LOVENOX) injection 40 mg  40 mg SubCUTAneous J50E    folic acid (FOLVITE) tablet 1 mg  1 mg Oral DAILY     ______________________________________________________________________  EXPECTED LENGTH OF STAY: - - -  ACTUAL LENGTH OF STAY:          2                 Vincent Jackson MD

## 2018-08-27 NOTE — CONSULTS
Radu Humphrey  MR#: [id number]  : 1955  ACCOUNT #: [de-identified]   DATE OF SERVICE: 2018    HISTORY OF PRESENT ILLNESS:  The patient is a 65-year-old woman with a history of metastatic non-small cell lung cancer who is seen after admission to [address] [place] [address] with shortness of breath on 2018. This patient received her first dose of Avastin and Alimta on Friday, , at Dr. Phuc Ortiz office. She experienced no difficulty that evening; however, on the morning of admission, she developed shortness of breath at rest.  She denied cough, hemoptysis, or fever. She came to the emergency room and was found to be hypoxic, and was admitted for further evaluation and therapy. In the ER, she had a CT scan of the chest which failed to demonstrate pulmonary embolus. It did, however, identify pulmonary nodules and liver metastases, which had grown since the last CT scan at [facility] in . She was treated with oxygen, jet nebulizers, and IV Levaquin, and has had some improvement in her breathing over the weekend. PAST MEDICAL HISTORY:  Significant for metastatic left lower lobe non-small cell lung cancer. She has skeletal disease at L2 as well as liver metastases identified. She has been treated with radiation therapy to the lumbar spine. She was treated with Tarceva with systemic therapy and then more recently Tagrisso x3 months before experiencing disease progression in August.  Her past medical history also includes GERD, hypertension, lupus, migraine headaches, reflux. PAST SURGICAL HISTORY:  Includes EGD in . SOCIAL HISTORY:  She never smoked. She has 1 daughter. ALLERGIES:  NO KNOWN DRUG ALLERGIES. REVIEW OF SYSTEMS:  She has had weight loss and anorexia for the past few months. Review of systems otherwise noncontributory.      FAMILY HISTORY:  Her mother passed away at age 61 with colon cancer, another sister had sickle cell anemia. PHYSICAL EXAMINATION:  GENERAL:  She is a pleasant, well-developed, well-nourished female in no apparent distress. She seems a bit depressed. HEENT:  EOMI. She is wearing nasal cannula oxygen. NECK:  Supple. She has no palpable peripheral lymphadenopathy. LUNGS:  Reveal crackles in the right lung base. CARDIAC:  Regular rate and rhythm. No murmur, no S3.  ABDOMEN:  Soft, nontender, no hepatosplenomegaly noted. EXTREMITIES:  No clubbing, cyanosis, or edema. NEUROLOGIC:  A and O x3, nonfocal.    IMPRESSION:    1. Metastatic non-small cell lung cancer, currently receiving third-line therapy with Alimta/Avastin after EGFR-directed oral therapy. This patient has unexplained worsening shortness of breath 24 hours following her chemotherapy administration on Friday, 08/24. The drugs that she received are not noted to cause this complication. We do see occasional infusion reactions which occur during the infusion itself. We also see occasional fluid overload problems in patients receiving chemotherapy, which may or may not be playing a role here. This patient has had some improvement in symptoms since starting jet nebs and Levaquin. I would consider asking Pulmonary to see her in the morning to determine if there are any other causes that are overlooked and to see if she is going to require home oxygen. 2.  Anorexia. I reviewed the different appetite stimulants that are available and she would like to try Remeron 15 mg at bedtime. We will get that started now. We will follow along with you on behalf of [place].       MD MARICARMEN Green / CHARLA  D: 08/27/2018 15:25     T: 08/27/2018 15:52  JOB #: [id number]

## 2018-08-27 NOTE — PROGRESS NOTES
Problem: Nausea/Vomiting (Adult)  Goal: *Absence of nausea/vomiting  Outcome: Not Progressing Towards Goal  Patient is nauseated and given zofran 4 mg iv and quease/ease for smell to decrease nausea.

## 2018-08-27 NOTE — PROGRESS NOTES
6 minute walk performed with patient on room air oxygen. Patient's baseline room air o2 sat was 97%. Patient dropped to 89% o2 sat X2 occasions while walking after pulse went above 100 bpm.  O2 sat stayed at 94% for most of the walk. When patient was returned to her bed, her o2 sat on room air was at 88%. Placed on 2l nc oxygen to recover.

## 2018-08-28 ENCOUNTER — APPOINTMENT (OUTPATIENT)
Dept: CT IMAGING | Age: 63
DRG: 189 | End: 2018-08-28
Attending: INTERNAL MEDICINE
Payer: COMMERCIAL

## 2018-08-28 ENCOUNTER — APPOINTMENT (OUTPATIENT)
Dept: GENERAL RADIOLOGY | Age: 63
DRG: 189 | End: 2018-08-28
Attending: INTERNAL MEDICINE
Payer: COMMERCIAL

## 2018-08-28 LAB
BNP SERPL-MCNC: 257 PG/ML (ref 0–125)
COMMENT, HOLDF: NORMAL
CRP SERPL-MCNC: 5.82 MG/DL (ref 0–0.6)
ERYTHROCYTE [SEDIMENTATION RATE] IN BLOOD: 83 MM/HR (ref 0–30)
GLUCOSE BLD STRIP.AUTO-MCNC: 110 MG/DL (ref 65–100)
GLUCOSE BLD STRIP.AUTO-MCNC: 116 MG/DL (ref 65–100)
GLUCOSE BLD STRIP.AUTO-MCNC: 118 MG/DL (ref 65–100)
GLUCOSE BLD STRIP.AUTO-MCNC: 124 MG/DL (ref 65–100)
IRON SATN MFR SERPL: 39 % (ref 20–50)
IRON SERPL-MCNC: 75 UG/DL (ref 35–150)
SAMPLES BEING HELD,HOLD: NORMAL
SERVICE CMNT-IMP: ABNORMAL
TIBC SERPL-MCNC: 194 UG/DL (ref 250–450)

## 2018-08-28 PROCEDURE — 82962 GLUCOSE BLOOD TEST: CPT

## 2018-08-28 PROCEDURE — 77010033678 HC OXYGEN DAILY

## 2018-08-28 PROCEDURE — 94664 DEMO&/EVAL PT USE INHALER: CPT

## 2018-08-28 PROCEDURE — 71250 CT THORAX DX C-: CPT

## 2018-08-28 PROCEDURE — 74011000250 HC RX REV CODE- 250: Performed by: HOSPITALIST

## 2018-08-28 PROCEDURE — 71045 X-RAY EXAM CHEST 1 VIEW: CPT

## 2018-08-28 PROCEDURE — 74011250637 HC RX REV CODE- 250/637: Performed by: HOSPITALIST

## 2018-08-28 PROCEDURE — 86140 C-REACTIVE PROTEIN: CPT | Performed by: INTERNAL MEDICINE

## 2018-08-28 PROCEDURE — 94760 N-INVAS EAR/PLS OXIMETRY 1: CPT

## 2018-08-28 PROCEDURE — 74011250636 HC RX REV CODE- 250/636: Performed by: INTERNAL MEDICINE

## 2018-08-28 PROCEDURE — 83880 ASSAY OF NATRIURETIC PEPTIDE: CPT | Performed by: INTERNAL MEDICINE

## 2018-08-28 PROCEDURE — 83540 ASSAY OF IRON: CPT | Performed by: HOSPITALIST

## 2018-08-28 PROCEDURE — 74011250636 HC RX REV CODE- 250/636: Performed by: HOSPITALIST

## 2018-08-28 PROCEDURE — 71046 X-RAY EXAM CHEST 2 VIEWS: CPT

## 2018-08-28 PROCEDURE — 65270000029 HC RM PRIVATE

## 2018-08-28 PROCEDURE — 36415 COLL VENOUS BLD VENIPUNCTURE: CPT | Performed by: HOSPITALIST

## 2018-08-28 PROCEDURE — 94010 BREATHING CAPACITY TEST: CPT

## 2018-08-28 PROCEDURE — 74011250637 HC RX REV CODE- 250/637: Performed by: INTERNAL MEDICINE

## 2018-08-28 PROCEDURE — 94667 MNPJ CHEST WALL 1ST: CPT

## 2018-08-28 PROCEDURE — 94640 AIRWAY INHALATION TREATMENT: CPT

## 2018-08-28 PROCEDURE — 74011250637 HC RX REV CODE- 250/637: Performed by: NURSE PRACTITIONER

## 2018-08-28 PROCEDURE — 85652 RBC SED RATE AUTOMATED: CPT | Performed by: INTERNAL MEDICINE

## 2018-08-28 RX ORDER — ACETAMINOPHEN 325 MG/1
650 TABLET ORAL
Status: DISCONTINUED | OUTPATIENT
Start: 2018-08-28 | End: 2018-08-31 | Stop reason: HOSPADM

## 2018-08-28 RX ADMIN — ONDANSETRON 4 MG: 2 INJECTION INTRAMUSCULAR; INTRAVENOUS at 21:08

## 2018-08-28 RX ADMIN — IPRATROPIUM BROMIDE AND ALBUTEROL SULFATE 3 ML: .5; 3 SOLUTION RESPIRATORY (INHALATION) at 14:01

## 2018-08-28 RX ADMIN — Medication 10 ML: at 15:04

## 2018-08-28 RX ADMIN — SODIUM CHLORIDE 75 ML/HR: 900 INJECTION, SOLUTION INTRAVENOUS at 13:00

## 2018-08-28 RX ADMIN — Medication 81 MG: at 09:18

## 2018-08-28 RX ADMIN — IPRATROPIUM BROMIDE AND ALBUTEROL SULFATE 3 ML: .5; 3 SOLUTION RESPIRATORY (INHALATION) at 08:42

## 2018-08-28 RX ADMIN — HYDROCODONE BITARTRATE AND ACETAMINOPHEN 2 TABLET: 10; 325 TABLET ORAL at 12:58

## 2018-08-28 RX ADMIN — AMLODIPINE BESYLATE 5 MG: 5 TABLET ORAL at 09:18

## 2018-08-28 RX ADMIN — MINOCYCLINE HYDROCHLORIDE 100 MG: 50 CAPSULE ORAL at 09:18

## 2018-08-28 RX ADMIN — FOLIC ACID 1 MG: 1 TABLET ORAL at 09:18

## 2018-08-28 RX ADMIN — OXYCODONE HYDROCHLORIDE 20 MG: 10 TABLET, FILM COATED, EXTENDED RELEASE ORAL at 06:51

## 2018-08-28 RX ADMIN — POLYETHYLENE GLYCOL 3350 17 G: 17 POWDER, FOR SOLUTION ORAL at 09:18

## 2018-08-28 RX ADMIN — IPRATROPIUM BROMIDE AND ALBUTEROL SULFATE 3 ML: .5; 3 SOLUTION RESPIRATORY (INHALATION) at 01:23

## 2018-08-28 RX ADMIN — ENOXAPARIN SODIUM 40 MG: 40 INJECTION, SOLUTION INTRAVENOUS; SUBCUTANEOUS at 23:27

## 2018-08-28 RX ADMIN — MINOCYCLINE HYDROCHLORIDE 100 MG: 50 CAPSULE ORAL at 20:05

## 2018-08-28 RX ADMIN — IPRATROPIUM BROMIDE AND ALBUTEROL SULFATE 3 ML: .5; 3 SOLUTION RESPIRATORY (INHALATION) at 20:40

## 2018-08-28 RX ADMIN — HYDROMORPHONE HYDROCHLORIDE 2 MG: 2 TABLET ORAL at 16:49

## 2018-08-28 RX ADMIN — Medication 10 ML: at 23:28

## 2018-08-28 RX ADMIN — ACYCLOVIR 400 MG: 200 CAPSULE ORAL at 09:18

## 2018-08-28 RX ADMIN — OXYCODONE HYDROCHLORIDE 20 MG: 10 TABLET, FILM COATED, EXTENDED RELEASE ORAL at 20:05

## 2018-08-28 RX ADMIN — LEVOFLOXACIN 500 MG: 5 INJECTION, SOLUTION INTRAVENOUS at 23:27

## 2018-08-28 RX ADMIN — ACETAMINOPHEN 650 MG: 325 TABLET ORAL at 21:45

## 2018-08-28 RX ADMIN — ONDANSETRON 4 MG: 2 INJECTION INTRAMUSCULAR; INTRAVENOUS at 05:10

## 2018-08-28 RX ADMIN — ACYCLOVIR 400 MG: 200 CAPSULE ORAL at 20:05

## 2018-08-28 RX ADMIN — HYDROMORPHONE HYDROCHLORIDE 2 MG: 2 TABLET ORAL at 05:13

## 2018-08-28 NOTE — PROCEDURES
[address]  PULMONARY FUNCTION    Name:FRANCES TYSON  MR#: [id number]  : 1955  ACCOUNT #: [de-identified]   DATE OF SERVICE: 2018    INDICATION:  Shortness of breath. FINDINGS:  Bedside spirometry was performed. Reduced FVC, reduced FEV1, normal FEV1/FVC ratio. IMPRESSION:  Moderate restrictive ventilatory defect based on spirometry. Lung volume measurements and diffusion capacity assessment may be beneficial in further evaluation of restrictive lung disease. Clinical correlation is recommended.       MD Yuly Kumar / CHARLA  D: 2018 13:54     T: 2018 17:32  JOB #: [id number]

## 2018-08-28 NOTE — PROGRESS NOTES
Problem: Falls - Risk of 
Goal: *Absence of Falls Document Emely Grossman Fall Risk and appropriate interventions in the flowsheet. Outcome: Progressing Towards Goal 
Fall Risk Interventions: 
Mobility Interventions: Communicate number of staff needed for ambulation/transfer, Patient to call before getting OOB Medication Interventions: Patient to call before getting OOB Elimination Interventions: Call light in reach, Patient to call for help with toileting needs History of Falls Interventions: Room close to nurse's station Problem: Constipation - Risk of 
Goal: *Prevention of constipation Outcome: Not Progressing Towards Goal 
Patient has still not had bowel movement despite medical interventions. Will continue to monitor.

## 2018-08-28 NOTE — CONSULTS
[facility]  Pulmonary, Critical Care, and Sleep Medicine  Name: Lyndsay Ochoa MRN: [id number]   : 1955 Hospital: [facility]    Date: 2018          IMPRESSION:   1. Acute hypoxia post chemo Tx- (Alimta/Avastin)- neg CTA/chest scan suggest bronchiectasis  2. Metastatic lung ca--> radiographic progression-- nodules enlarging  3. worsening bibasilar changes with bronchiectasis- not mentioned on CT report  4. Lifetime non smoker  5. Normal pulm mechanics by bedside oscar  6. H/o lupus with on MTX/plaquenil --->  Past \"pneumonitis\" --? ILD  7. nml EF by ECHO with moderate Pulm htn   8. patulent esophagus with ? chronic aspiration      RECOMMENDATIONS:   · Titrate oxygen- need to assess with walk ox before d/c  · Flutter valve  · Repeat CXR--> ? progressive rales on exam  · Likely needs hi res CT with prone/ expiratory films to eval further   · Check BNP/ CRP/ sed rate  · Elective full PFTs  · Monitor counts post chemo    Suspect there is a chronic pul issues with acute worsening. ..  >? aspiration event post chemo       Radiology  ( personally reviewed) CTA reviewed  Has dependent atx with worsened bronchiectatic changes c/w -178 sacn  pulm nodule w patch area GG nodularity. .. ABG Recent Labs      18   0301   PHI  7.517*   PO2I  47*   PCO2I  30.6*          Subjective/Interval History:   I have been asked to see this pleasant lady for hypoxia/dyspnea.   Dx metastatic lung ca-- adenoCA by liver bx 10/2017    Had prior XRT to lumbar spine L2  Previous on Tarceva---> then to [place]  Note traceva = <1%: Interstitial pulmonary disease    (noted some LE edema)    S/p chemo w Dr Desirae Olmedo + Avastin first tx Friday  Had GI pain at end of infusion--> resolved    SOB Saturday-->seemed to progress-->  to ED hypoxic  CTA neg for PE overt pna  rx with nebs/oxygen /LQ  feels some better  No cough      Has GERD/ patulent esophagus prior EGD         ROS  Wt loss/anorexia  LE edema past few months  No cough/sputum  No fever chills  No orthopnea  No HA/coryza  No sick exposures  Chronic anorexia    Patient Active Problem List   Diagnosis Code    Shortness of breath R06.02     Past Medical History:   Diagnosis Date    Arthritis     Autoimmune disease ([facility] [address]     lupus    Cancer ([facility] [address]     lung cancer    Gastrointestinal disorder     GERD    Hypertension      History reviewed. No pertinent surgical history. Social History     Social History    Marital status:      Spouse name: N/A    Number of children: N/A    Years of education: N/A     Occupational History    Not on file. Social History Main Topics    Smoking status: Never Smoker    Smokeless tobacco: Never Used    Alcohol use Yes    Drug use: No    Sexual activity: Not on file     Other Topics Concern    Not on file     Social History Narrative     History reviewed. No pertinent family history.   Lifetime nonsmoker      FH  + MI/colon Ca    Prior to Admission Medications   Prescriptions Last Dose Informant Patient Reported? Taking? HYDROcodone-acetaminophen (NORCO)  mg tablet Not Taking at Unknown time  Yes No   Sig: Take 2 Tabs by mouth every six (6) hours as needed for Pain. HYDROmorphone (DILAUDID) 2 mg tablet   Yes Yes   Sig: Take 2 mg by mouth every four (4) hours as needed for Pain. acyclovir (ZOVIRAX) 400 mg tablet 8/25/2018 at 2100  Yes Yes   Sig: Take 400 mg by mouth two (2) times a day. amLODIPine (NORVASC) 2.5 mg tablet 8/25/2018 at Unknown time  Yes Yes   Sig: Take 5 mg by mouth daily. amitriptyline (ELAVIL) 10 mg tablet Not Taking at Unknown time  Yes No   Sig: Take  by mouth nightly. aspirin delayed-release 81 mg tablet 8/25/2018 at Unknown time  Yes Yes   Sig: Take 81 mg by mouth daily. calcium citrate-vitamin D3 (CITRACAL + D) tablet 8/25/2018 at Unknown time  Yes Yes   Sig: Take 1 Tab by mouth two (2) times a day.    estradiol (ESTRACE) 0.01 % (0.1 mg/gram) vaginal cream Not Taking at Unknown time  Yes No   Sig: Insert 2 g into vagina daily. folic acid (FOLVITE) 1 mg tablet 8/25/2018 at Unknown time  Yes Yes   Sig: Take 1 mg by mouth daily. hydroxychloroquine (PLAQUENIL) 200 mg tablet Not Taking at Unknown time  Yes No   Sig: Take 200 mg by mouth daily. methotrexate (RHEUMATREX) 2.5 mg tablet Not Taking at Unknown time  Yes No   Sig: Take 2.5 mg by mouth.   minocycline (DYNACIN) 100 mg tablet 8/25/2018 at 2100  Yes Yes   Sig: Take 100 mg by mouth two (2) times a day. multivitamin (ONE A DAY) tablet 8/25/2018 at Unknown time  Yes Yes   Sig: Take 1 Tab by mouth daily. omeprazole (PRILOSEC) 20 mg capsule Not Taking at Unknown time  Yes No   Sig: Take 20 mg by mouth daily. oxyCODONE ER (OXYCONTIN) 20 mg ER tablet 8/25/2018 at  1900  Yes Yes   Sig: Take 20 mg by mouth every twelve (12) hours. Indications: Chronic Pain   triamcinolone (KENALOG) 0.1 % lotion Unknown at Unknown time  Yes No   Sig: Apply  to affected area two (2) times a day.  use thin layer      Facility-Administered Medications: None       No Known Allergies    Objective:       Vital Signs:      Patient Vitals for the past 24 hrs:   Temp Pulse Resp BP SpO2   08/28/18 0916 98.7 °F (37.1 °C) 73 18 146/77 95 %   08/28/18 0843 - - - - 91 %   08/28/18 0123 - - - - 96 %   08/28/18 0120 98.3 °F (36.8 °C) 76 18 165/86 95 %   08/27/18 2148 - 92 - 158/86 -   08/27/18 2147 - 88 - 163/88 -   08/27/18 2040 98.4 °F (36.9 °C) 83 18 154/79 97 %   08/27/18 2017 - - - - 95 %   08/27/18 1937 98.4 °F (36.9 °C) 70 18 156/84 97 %   08/27/18 1507 98.9 °F (37.2 °C) 76 18 154/81 100 %   08/27/18 1355 - - - - 98 %   08/27/18 1241 - - - - 99 %           Intake/Output:   Last shift:         Last 3 shifts: 08/28 0701 - 08/28 1900  In: 100 [P.O.:100]  Out: 400 [Urine:400]RRIOLAST3  Intake/Output Summary (Last 24 hours) at 08/28/18 1056  Last data filed at 08/28/18 0916   Gross per 24 hour   Intake              100 ml   Output 1000 ml   Net             -900 ml     EXAM:     Pleasant tired appearing BF in NAD  Family at bedside  NC/AT  EOMI/ sclera anicteric  Moist MM  No JVD  Chest bibasilar rales  CV s1 s2 RRR  abd soft, benign  LE trace pedal  nonfocal  nml affect  Skin no rash      Data    I have personally reviewed data, flowsheets for the last 24 hours. Labs:  Recent Labs      08/26/18   0206  08/25/18   1837   WBC  12.4*  13.3*   HGB  8.7*  9.4*   HCT  27.4*  30.7*   PLT  380  441*     Recent Labs      08/26/18   0206  08/25/18   1837   NA  139  137   K  4.0  4.1   CL  105  105   CO2  25  24   GLU  147*  168*   BUN  24*  22*   CREA  0.83  0.93   CA  7.7*  8.1*   ALB   --   2.4*   SGOT   --   28   ALT   --   20     9/2017 esophogram= IMPRESSION:  1. Large patulous thoracic esophagus with a patulous GE junction, and no  esophagitis, hiatal hernia or reflux demonstrated.  .  2. Otherwise unremarkable air contrast barium swallow and upper GI for age. [place] ECHO:    Left ventricle: Systolic function was normal. Ejection fraction was  estimated in the range of 55 % to 60 %. There were no regional wall motion  abnormalities. Wall thickness was mildly increased. Tricuspid valve: There was mild regurgitation. Pulmonary artery systolic  pressure was mildly to moderately increased. Pericardium: A possible, trivial pericardial effusion was identified. INDICATIONS: Abnormal EKG    PROCEDURE: This was a routine study. The study included complete 2D  imaging, M-mode, complete spectral Doppler, and color Doppler. The heart  rate was 59 bpm, at the start of the study. Systolic blood pressure was  158 mmHg, at the start of the study. Diastolic blood pressure was 83 mmHg,  at the start of the study. Images were obtained from the parasternal,  apical, subcostal, and suprasternal notch acoustic windows. Image quality  was good.     LEFT VENTRICLE: Size was normal. Systolic function was normal. Ejection  fraction was estimated in the range of 55 % to 60 %. There were no  regional wall motion abnormalities. Wall thickness was mildly increased. RIGHT VENTRICLE: The size was normal. Systolic function was normal. Wall  thickness was normal.    LEFT ATRIUM: Size was normal.    RIGHT ATRIUM: Size was normal.    MITRAL VALVE: There was mild diffuse thickening. There was normal leaflet  separation. AORTIC VALVE: The valve was trileaflet. Leaflets exhibited normal  thickness and normal cuspal separation. DOPPLER: Transaortic velocity was  within the normal range. There was no stenosis. There was no regurgitation. TRICUSPID VALVE: Normal valve structure. There was normal leaflet  separation. DOPPLER: The transtricuspid velocity was within the normal  range. There was no evidence for tricuspid stenosis. There was mild  regurgitation. Pulmonary artery systolic pressure was mildly to moderately  increased. Pulmonary artery systolic pressure: 50 mmHg. PULMONIC VALVE: Leaflets exhibited normal thickness, no calcification, and  normal cuspal separation. DOPPLER: The transpulmonic velocity was within  the normal range. There was no regurgitation. AORTA: The root exhibited normal size. PERICARDIUM: A possible, trivial pericardial effusion was identified.         Josh Celeste MD  [facility]

## 2018-08-28 NOTE — PROGRESS NOTES
Care Management Interventions PCP Verified by CM: Yes 
Palliative Care Criteria Met (RRAT>21 & CHF Dx)?: No 
Mode of Transport at Discharge: Other (see comment) ([facility] will transport) Transition of Care Consult (CM Consult): Discharge Planning Discharge Durable Medical Equipment: No 
Physical Therapy Consult: No 
Occupational Therapy Consult: No 
Speech Therapy Consult: No 
Current Support Network: Lives with Spouse, Own Home Confirm Follow Up Transport: Self Plan discussed with Pt/Family/Caregiver: Yes [employer] Information Provided?: No 
Discharge Location Discharge Placement: Home with family assistance Reason for Admission:   SOB 
                
RRAT Score:          5 Plan for utilizing home health:      Not indicated at this time Likelihood of Readmission:  Low Transition of Care Plan:   TBD; Likely discharge home with family assistance. Met with patient and her daughter, Jocelyn South at bedside to introduce self and to discuss transitions of care. Confirmed demographics and insurance coverage. Lives with  in two story home. Independent with ADLs and denied use of assistance devices. Confirmed PCP is Dr. Azul William but does not see PCP on regular basis. Patient unable to recall last PCP visit. Fills Rx at [facility] pharmacy and [facility]. CM will continue to follow.              
Narda James, TERRY, CRM

## 2018-08-28 NOTE — PROGRESS NOTES
Hospitalist Progress Note Lisa Zhang MD 
Answering service: [phone] OR 36 from in house phone Cell: [phone] Date of Service:  2018 NAME:  Fletcher North :  1955 MRN:  [id number] Admission Summary:  
The patient is a 50-year-old [facility] American female who has been diagnosed with metastatic lung cancer in 10/2017. She started Avastin last Friday. According to the daughter and son, she had been short of breath for a while; however, today, her shortness of breath became worse, so she called her oncologist who recommended that the patient should go to ER to rule out pulmonary embolism. In ER, a CTA was done, which showed progression of disease with multiple pulmonary nodules; however, no PE was noticed. The patient desaturates easily when she goes to bathroom. Her pulse ox went down to 88%. She has a chronic cough which has not changed. She has no fever or chills. She denies having any orthopnea, but states that she has noticed some leg swelling. She sleeps on 1 pillow. Interval history / Subjective: F/u respiratory distress Assessment & Plan: SOB/Acute hypoxic respiratory failure 
-likely sec to chemotherapy, started last Friday 
-Wean off oxygen to keep sat>92% 
-Incentive spirometry 
-Appreciate Pulmonary, recommended repeat CXR, HRCT, follow Metastatic lung cancer 
-CT chest  Progression of disease with multiple pulmonary nodules. -diagnosed in  s/p chemotherapy s/p radiation therapy 
-Recently (yesterday) started on chemotherapy (infusion) 
-On LVQ, not sure if the patient really needs it Anemia 
-sec to ?chemotherapy 
-unremarkable work up 
-monitor HTN 
-restart amlodipine Lupus 
-restart home meds EKG changes 
-TWI in inferior leads are not new 
-Echo with EF 55-6-% with no RWMA Regular diet Code status: FULL CODE 
DVT prophylaxis: scd PTA: home Plan: Follow Oncology, pulmonary Care Plan discussed with: Patient/Family Disposition: TBD Hospital Problems  Date Reviewed: 8/25/2018 Codes Class Noted POA * (Principal)Shortness of breath ICD-10-CM: R06.02 
ICD-9-CM: 786.05  8/25/2018 Unknown Review of Systems: A comprehensive review of systems was negative except for that written in the HPI. Vital Signs:  
 Last 24hrs VS reviewed since prior progress note. Most recent are: 
Visit Vitals  /77 (BP 1 Location: Left arm, BP Patient Position: At rest)  Pulse 73  Temp 98.7 °F (37.1 °C)  Resp 18  Ht 5' 2.5\" (1.588 m)  Wt 66.9 kg (147 lb 8 oz)  SpO2 91%  Breastfeeding No  
 BMI 26.55 kg/m2 Intake/Output Summary (Last 24 hours) at 08/28/18 1454 Last data filed at 08/28/18 1343 Gross per 24 hour Intake              100 ml Output             1300 ml Net            -1200 ml Physical Examination:  
 
 
     
Constitutional:  No acute distress, cooperative, pleasant   
ENT:  Oral mucous moist, oropharynx benign. Neck supple, Resp:  CTA bilaterally. No wheezing/rhonchi/rales. No accessory muscle use CV:  Regular rhythm, normal rate, no murmurs, gallops, rubs GI:  Soft, non distended, non tender. normoactive bowel sounds, no hepatosplenomegaly Musculoskeletal:  No edema, warm, 2+ pulses throughout Neurologic:  Moves all extremities. AAOx3, CN II-XII reviewed Skin:  Good turgor, no rashes or ulcers Data Review:  
 Review and/or order of clinical lab test 
 
 
Labs:  
 
Recent Labs  
   08/26/18 
 0206  08/25/18 [address] WBC  12.4*  13.3* HGB  8.7*  9.4* HCT  27.4*  30.7* PLT  380  441* Recent Labs  
   08/26/18 
 0206  08/25/18 [address] NA  139  137  
K  4.0  4.1 CL  105  105 CO2  25  24 BUN  24*  22* CREA  0.83  0.93 GLU  147*  168* CA  7.7*  8.1* Recent Labs  
   08/25/18 [address] SGOT  28 ALT  20 AP  104 TBILI  0.4 TP  9.3* ALB  2.4*  
GLOB  6.9* No results for input(s): INR, PTP, APTT in the last 72 hours. No lab exists for component: INREXT, INREXT Recent Labs  
   08/28/18 
 2338 TIBC  194* PSAT  39 No results found for: FOL, RBCF No results for input(s): PH, PCO2, PO2 in the last 72 hours. Recent Labs  
   08/25/18 [address] TROIQ  <0.05 No results found for: CHOL, CHOLX, CHLST, CHOLV, HDL, LDL, LDLC, DLDLP, TGLX, TRIGL, TRIGP, CHHD, CHHDX Lab Results Component Value Date/Time Glucose (POC) 110 (H) 08/28/2018 11:30 AM  
 Glucose (POC) 116 (H) 08/28/2018 06:21 AM  
 Glucose (POC) 120 (H) 08/27/2018 09:22 PM  
 Glucose (POC) 128 (H) 08/27/2018 04:20 PM  
 Glucose (POC) 140 (H) 08/27/2018 11:19 AM  
 
Lab Results Component Value Date/Time Color YELLOW/STRAW 08/25/2018 08:29 PM  
 Appearance CLEAR 08/25/2018 08:29 PM  
 Specific gravity 1.021 08/25/2018 08:29 PM  
 pH (UA) 7.0 08/25/2018 08:29 PM  
 Protein NEGATIVE  08/25/2018 08:29 PM  
 Glucose NEGATIVE  08/25/2018 08:29 PM  
 Ketone NEGATIVE  08/25/2018 08:29 PM  
 Bilirubin NEGATIVE  08/25/2018 08:29 PM  
 Urobilinogen 0.2 08/25/2018 08:29 PM  
 Nitrites NEGATIVE  08/25/2018 08:29 PM  
 Leukocyte Esterase NEGATIVE  08/25/2018 08:29 PM  
 Epithelial cells FEW 08/25/2018 08:29 PM  
 Bacteria NEGATIVE  08/25/2018 08:29 PM  
 WBC 0-4 08/25/2018 08:29 PM  
 RBC 0-5 08/25/2018 08:29 PM  
 
 
 
Medications Reviewed:  
 
Current Facility-Administered Medications Medication Dose Route Frequency  mirtazapine (REMERON SOL-TAB) disintegrating tablet 15 mg  15 mg Oral QHS  
 0.9% sodium chloride infusion  75 mL/hr IntraVENous CONTINUOUS  
 oxyCODONE ER (OxyCONTIN) tablet 20 mg  20 mg Oral Q12H  
 HYDROmorphone (DILAUDID) tablet 2 mg  2 mg Oral Q4H PRN  polyethylene glycol (MIRALAX) packet 17 g  17 g Oral DAILY  amLODIPine (NORVASC) tablet 5 mg  5 mg Oral DAILY  minocycline (MINOCIN, DYNACIN) capsule 100 mg  100 mg Oral BID  
  acyclovir (ZOVIRAX) capsule 400 mg  400 mg Oral BID  ondansetron (ZOFRAN) injection 4 mg  4 mg IntraVENous Q8H PRN  
 aspirin delayed-release tablet 81 mg  81 mg Oral DAILY  HYDROcodone-acetaminophen (NORCO)  mg tablet 2 Tab  2 Tab Oral Q6H PRN  
 albuterol-ipratropium (DUO-NEB) 2.5 MG-0.5 MG/3 ML  3 mL Nebulization Q6H RT  
 levoFLOXacin (LEVAQUIN) 500 mg in D5W IVPB  500 mg IntraVENous Q24H  
 sodium chloride (NS) flush 5-10 mL  5-10 mL IntraVENous Q8H  
 sodium chloride (NS) flush 5-10 mL  5-10 mL IntraVENous PRN  
 enoxaparin (LOVENOX) injection 40 mg  40 mg SubCUTAneous A43R  
 folic acid (FOLVITE) tablet 1 mg  1 mg Oral DAILY  
 
______________________________________________________________________ EXPECTED LENGTH OF STAY: 3d 16h ACTUAL LENGTH OF STAY:          3 Camacho Gerber MD

## 2018-08-28 NOTE — PROGRESS NOTES
NUTRITION COMPLETE ASSESSMENT 
 
RECOMMENDATIONS:  
1. Continue current diet 2. Adjusted supplements to change flavor & add homemade milkshake daily Interventions/Plan:  
Food/Nutrient Delivery:    Modified beverage       homemade chocolate milkshake daily (Humaira I/C & Chocolate Compact); Apple Ensure Clear (trial) Nutrition Education:   encourage calorie containing beverages, small frequent bites & sips; adequate calorie/protein intake Coordination of Care:   
 
Assessment:  
Reason for Assessment:  
[] Provider Consult [x]BPA/MST Referral - Poor PO 
[]LOS []Reassessment  
[]NPO/Clear Liquid []At Nutrition Risk []Other Diet: Regular Supplements: Ensure Enlive (dislikes) & Ensure Clear (dislikes berry flavor) Nutritionally Significant Medications: [x] Reviewed & Includes: IVF @75mL/hr; duo-neb; norvasc; folic acid; Levaquin; minocin; remeron; miralax daily;   
 
Meal Intake:  
Patient Vitals for the past 100 hrs: 
 % Diet Eaten 08/28/18 0916 25 % 08/27/18 0929 25 % Pre-Hospitalization: 
Usual Appetite: Fair Diet at Home: Regular Vitamins/Supplements: No 
 
Current Hospitalization:  
Fluid Restriction:  n/a Appetite: Poor PO Ability: Independent  Average po intake:25-50% Average supplements intake: < 10% Subjective: 
Visited with patient, daughter at bedside \"I just haven't liked very sweet things\" Objective: 
Pt admitted for worsening SOB with post new chemo (Avastin) for Metastatic Lung cancer. PMHx: dx lung Ca 10/17, lupus, GERD, HTN. Pt referred to RD for poor intake. Pt and RN report declining appetite recently and current. Pt dislikes many ONS offered. Wt appears stable & actually up currently likely from edema. Concerns for poor intake. Will offer modified shakes. Estimated Nutrition Needs:  
Kcals/day: 0200 Kcals/day Protein: 80 g (1.2 g/kg of current wt) Fluid: 1673 ml (25 mL/kg of current wt) Based On: [address] Jeor (AF 1.3) Weight Used: Actual wt (66.9 kg) Pt expected to meet estimated nutrient needs:  []   Yes     []  No [x] Unable to predict at this time Nutrition Diagnosis:  
1. Inadequate oral intake related to early satiety as evidenced by limited intake at meals, dislikes many ONS offered,  
 
Goals:   
 pt to tolerate 50% of meals and milkshakes offered within 1 week Monitoring & Evaluation: - Total energy intake, Protein intake - Weight/weight change, Nutritional anemia profile -   
 
Previous Nutrition Goals Met:   N/A Previous Recommendations:    N/A Education & Discharge Needs: 
 [x] None Identified 
 [] Identified and addressed  
 [] Participated in care plan, discharge planning, and/or interdisciplinary rounds Cultural, Adventist and ethnic food preferences identified: None Skin Integrity: [x]Intact  []Other Edema: []None [x] LLE & RLE 1+ Last BM: 8/24/18 Food Allergies: [x]None []Other Diet Restrictions: Cultural/Rastafari Preference(s): None Anthropometrics:   
Weight Loss Metrics 8/25/2018 8/2/2018 4/15/2018 10/13/2017 10/2/2017 Today's Wt 147 lb 8 oz 139 lb 12.8 oz 147 lb 5 oz 141 lb 141 lb BMI 26.55 kg/m2 25.16 kg/m2 26.51 kg/m2 25.79 kg/m2 25.79 kg/m2 Weight Source: Standing scale (comment) Height: 5' 2.5\" (158.8 cm), Body mass index is 26.55 kg/(m^2). IBW : 51.3 kg (113 lb),   
 ,   
 
Labs:   
Lab Results Component Value Date/Time Sodium 139 08/26/2018 02:06 AM  
 Potassium 4.0 08/26/2018 02:06 AM  
 Chloride 105 08/26/2018 02:06 AM  
 CO2 25 08/26/2018 02:06 AM  
 Glucose 147 (H) 08/26/2018 02:06 AM  
 BUN 24 (H) 08/26/2018 02:06 AM  
 Creatinine 0.83 08/26/2018 02:06 AM  
 Calcium 7.7 (L) 08/26/2018 02:06 AM  
 Albumin 2.4 (L) 08/25/2018 06:37 PM  
 
No results found for: HBA1C, HGBE8, JKU2KUSE, DLI0SKRU, CMU2MQJK Jordy Mcdonald RD, MS, CDE [facility]#[id number] or [phone]

## 2018-08-28 NOTE — PROGRESS NOTES
Problem: Discharge Planning Goal: *Discharge to safe environment Outcome: Progressing Towards Goal 
See CM notes TERRY Wells, CRM

## 2018-08-28 NOTE — PROGRESS NOTES
Bedside shift change report given to Alejandro Shaw (oncoming nurse) by Irma West (offgoing nurse). Report included the following information SBAR.

## 2018-08-28 NOTE — PROGRESS NOTES
Bedside shift change report given to Kathe Murry RN (oncoming nurse) by Justus Hernandez RN (offgoing nurse). Report included the following information SBAR and Kardex.

## 2018-08-28 NOTE — PROGRESS NOTES
Bedside shift change report given to tamara (oncoming nurse) by Jessica Olivas (offgoing nurse). Report included the following information SBAR, Kardex, MAR and Recent Results.

## 2018-08-28 NOTE — PROGRESS NOTES
Hematology-Oncology Progress Note Iris Lemon 1955 
[id number] 
8/28/2018 Follow-up for: lung cancer [x]        Chart notes since last visit reviewed [x]        Medications reviewed for allergies and interactions Case discussed with the following:         []            
               [x]        Nursing Staff  
                                                                      []        Pathologist 
                                                                      [x]        FAMILY Subjective:  
 
Spoke with patient who complains of: pt. Appears depressed, no c/o pain, still with nation Objective:  
Patient Vitals for the past 24 hrs: 
 BP Temp Pulse Resp SpO2  
08/28/18 0916 146/77 98.7 °F (37.1 °C) 73 18 95 % 08/28/18 0843 - - - - 91 % 08/28/18 0123 - - - - 96 % 08/28/18 0120 165/86 98.3 °F (36.8 °C) 76 18 95 % 08/27/18 2148 158/86 - 92 - -  
08/27/18 2147 163/88 - 88 - -  
08/27/18 2040 154/79 98.4 °F (36.9 °C) 83 18 97 % 08/27/18 2017 - - - - 95 % 08/27/18 1937 156/84 98.4 °F (36.9 °C) 70 18 97 % 08/27/18 1507 154/81 98.9 °F (37.2 °C) 76 18 100 % 08/27/18 1355 - - - - 98 % 08/27/18 1241 - - - - 99 % REVIEW OF SYSTEMS:   
Constitutional: negative fever, negative chills, negative weight loss Eyes:   negative visual changes ENT:   negative sore throat, tongue or lip swelling Cards:  negative for chest pain, palpitations, lower extremity edema GI:   negative for nausea, vomiting, diarrhea, and abdominal pain Neuro:  negative for headaches, dizziness, vertigo [x]                        Full ROS o/w normal/non contributor Constitutional:  Patient looks []        Sick [x]        Frail 
[]        Better                                                
[]        Depressed HEENT:  [x]   NC                         []   AT               []    ALOPECIA Eyes: [x]   Normal               []    Icteric Oropharynx: []    Normal                  []  Thrush               []   Dry Mucositis: [x]    None                 Grade: []        I  []        II  []        III  []        IV Neck:   [x]   Supple                  []  Rigid JVD:    [x]   ABSENT       []   PRESENT Lymphadenopathy:   [x]   None Noted            []   PRESENT Chest: 
[x]   Clear               []    Rhonchi                      Dec'd @     [x]  Right Base           [x]   Left Base CV:             [x]   Regular              []  Irregular               []   Tachy                []   Murmur Abdominal:   [x]    Soft              []   NON-tender               []   Tender BS:    [x]   ABSENT                   []   PRESENT Liver:     [x]  NON-palp                  []   EDGE- palp Spleen: [x]   NON-palp                   []  EDGE - palp Mass:   []   ABSENT                          [x]  PRESENT Extr:    [x]  Lymphedema             []   Cyanosis      []  Clubbing Edema:     [x]   NONE       []   PRESENT Skin:  Intact [x]           Purpura [] Rash: [x]   ABSENT       []  PRESENT Neuro: 
[x]        Normal 
[]        Confused Available labs reviewed: 
Labs:   
Recent Results (from the past 24 hour(s)) GLUCOSE, POC Collection Time: 08/27/18 11:19 AM  
Result Value Ref Range Glucose (POC) 140 (H) 65 - 100 mg/dL Performed by Jessica Cedeño GLUCOSE, POC Collection Time: 08/27/18  4:20 PM  
Result Value Ref Range Glucose (POC) 128 (H) 65 - 100 mg/dL Performed by Madison Serve GLUCOSE, POC Collection Time: 08/27/18  9:22 PM  
Result Value Ref Range Glucose (POC) 120 (H) 65 - 100 mg/dL Performed by Sveta Serve GLUCOSE, POC Collection Time: 08/28/18  6:21 AM  
Result Value Ref Range Glucose (POC) 116 (H) 65 - 100 mg/dL [facility] Collection Time: 08/28/18  6:37 AM  
Result Value Ref Range Iron 75 35 - 150 ug/dL TIBC 194 (L) 250 - 450 ug/dL Iron % saturation 39 20 - 50 % Available Xrays reviewed: 
 
Chemotherapy monitored and toxicities assessed: 
 
Assessment and Plan 1. Met. Lung cancer, s/p first dose of alimta/avastan on 8/24. .. Too early to assess for response, counts are likely to be dropping soon, should have cbc's while here 2. Dyspnea. . Multi-factorial. She states that she was \"fine\" on day of chemo but became sob the next day for no apparent cause. . CTA is negative for PNA/PE/effusion. .. She is getting jet nebs. cxr this am shows atelectasis. Will see what pulmonary has to say today. She may need home oxygen. .. 3. Alana Garcia MD

## 2018-08-29 ENCOUNTER — APPOINTMENT (OUTPATIENT)
Dept: CT IMAGING | Age: 63
DRG: 189 | End: 2018-08-29
Attending: INTERNAL MEDICINE
Payer: COMMERCIAL

## 2018-08-29 LAB
ANION GAP SERPL CALC-SCNC: 5 MMOL/L (ref 5–15)
BASOPHILS # BLD: 0 K/UL (ref 0–0.1)
BASOPHILS NFR BLD: 0 % (ref 0–1)
BUN SERPL-MCNC: 19 MG/DL (ref 6–20)
BUN/CREAT SERPL: 26 (ref 12–20)
CALCIUM SERPL-MCNC: 7.3 MG/DL (ref 8.5–10.1)
CHLORIDE SERPL-SCNC: 108 MMOL/L (ref 97–108)
CO2 SERPL-SCNC: 23 MMOL/L (ref 21–32)
CREAT SERPL-MCNC: 0.72 MG/DL (ref 0.55–1.02)
DIFFERENTIAL METHOD BLD: ABNORMAL
EOSINOPHIL # BLD: 0.1 K/UL (ref 0–0.4)
EOSINOPHIL NFR BLD: 2 % (ref 0–7)
ERYTHROCYTE [DISTWIDTH] IN BLOOD BY AUTOMATED COUNT: 17.8 % (ref 11.5–14.5)
GLUCOSE BLD STRIP.AUTO-MCNC: 100 MG/DL (ref 65–100)
GLUCOSE BLD STRIP.AUTO-MCNC: 104 MG/DL (ref 65–100)
GLUCOSE BLD STRIP.AUTO-MCNC: 131 MG/DL (ref 65–100)
GLUCOSE SERPL-MCNC: 106 MG/DL (ref 65–100)
HCT VFR BLD AUTO: 26.7 % (ref 35–47)
HEMOCCULT STL QL: NEGATIVE
HGB BLD-MCNC: 8.3 G/DL (ref 11.5–16)
IMM GRANULOCYTES # BLD: 0 K/UL
IMM GRANULOCYTES NFR BLD AUTO: 0 %
LYMPHOCYTES # BLD: 0.5 K/UL (ref 0.8–3.5)
LYMPHOCYTES NFR BLD: 7 % (ref 12–49)
MCH RBC QN AUTO: 25.5 PG (ref 26–34)
MCHC RBC AUTO-ENTMCNC: 31.1 G/DL (ref 30–36.5)
MCV RBC AUTO: 82.2 FL (ref 80–99)
MONOCYTES # BLD: 0.1 K/UL (ref 0–1)
MONOCYTES NFR BLD: 1 % (ref 5–13)
NEUTS SEG # BLD: 6.2 K/UL (ref 1.8–8)
NEUTS SEG NFR BLD: 90 % (ref 32–75)
NRBC # BLD: 0 K/UL (ref 0–0.01)
NRBC BLD-RTO: 0 PER 100 WBC
PLATELET # BLD AUTO: 273 K/UL (ref 150–400)
PLATELET COMMENTS,PCOM: ABNORMAL
PMV BLD AUTO: 9.2 FL (ref 8.9–12.9)
POTASSIUM SERPL-SCNC: 4.5 MMOL/L (ref 3.5–5.1)
RBC # BLD AUTO: 3.25 M/UL (ref 3.8–5.2)
RBC MORPH BLD: ABNORMAL
SERVICE CMNT-IMP: ABNORMAL
SERVICE CMNT-IMP: ABNORMAL
SERVICE CMNT-IMP: NORMAL
SODIUM SERPL-SCNC: 136 MMOL/L (ref 136–145)
WBC # BLD AUTO: 6.9 K/UL (ref 3.6–11)

## 2018-08-29 PROCEDURE — 74011250636 HC RX REV CODE- 250/636: Performed by: INTERNAL MEDICINE

## 2018-08-29 PROCEDURE — 77010033678 HC OXYGEN DAILY

## 2018-08-29 PROCEDURE — 74011000250 HC RX REV CODE- 250: Performed by: HOSPITALIST

## 2018-08-29 PROCEDURE — 74011250637 HC RX REV CODE- 250/637: Performed by: HOSPITALIST

## 2018-08-29 PROCEDURE — 80048 BASIC METABOLIC PNL TOTAL CA: CPT | Performed by: HOSPITALIST

## 2018-08-29 PROCEDURE — 85025 COMPLETE CBC W/AUTO DIFF WBC: CPT | Performed by: HOSPITALIST

## 2018-08-29 PROCEDURE — 92610 EVALUATE SWALLOWING FUNCTION: CPT

## 2018-08-29 PROCEDURE — 70450 CT HEAD/BRAIN W/O DYE: CPT

## 2018-08-29 PROCEDURE — 74011250636 HC RX REV CODE- 250/636: Performed by: HOSPITALIST

## 2018-08-29 PROCEDURE — 82962 GLUCOSE BLOOD TEST: CPT

## 2018-08-29 PROCEDURE — 65270000029 HC RM PRIVATE

## 2018-08-29 PROCEDURE — 74011250637 HC RX REV CODE- 250/637: Performed by: NURSE PRACTITIONER

## 2018-08-29 PROCEDURE — 94760 N-INVAS EAR/PLS OXIMETRY 1: CPT

## 2018-08-29 PROCEDURE — 94640 AIRWAY INHALATION TREATMENT: CPT

## 2018-08-29 PROCEDURE — 74011250637 HC RX REV CODE- 250/637: Performed by: INTERNAL MEDICINE

## 2018-08-29 PROCEDURE — 82272 OCCULT BLD FECES 1-3 TESTS: CPT | Performed by: HOSPITALIST

## 2018-08-29 PROCEDURE — 94761 N-INVAS EAR/PLS OXIMETRY MLT: CPT

## 2018-08-29 PROCEDURE — 36415 COLL VENOUS BLD VENIPUNCTURE: CPT | Performed by: HOSPITALIST

## 2018-08-29 PROCEDURE — 74011250637 HC RX REV CODE- 250/637: Performed by: PHYSICIAN ASSISTANT

## 2018-08-29 RX ORDER — DOCUSATE SODIUM 100 MG/1
100 CAPSULE, LIQUID FILLED ORAL DAILY
Status: DISCONTINUED | OUTPATIENT
Start: 2018-08-29 | End: 2018-08-31 | Stop reason: HOSPADM

## 2018-08-29 RX ORDER — ONDANSETRON 2 MG/ML
4 INJECTION INTRAMUSCULAR; INTRAVENOUS
Status: DISCONTINUED | OUTPATIENT
Start: 2018-08-29 | End: 2018-08-29

## 2018-08-29 RX ORDER — FAMOTIDINE 20 MG/1
20 TABLET, FILM COATED ORAL 2 TIMES DAILY
Status: DISCONTINUED | OUTPATIENT
Start: 2018-08-29 | End: 2018-08-31 | Stop reason: HOSPADM

## 2018-08-29 RX ORDER — POLYETHYLENE GLYCOL 3350 17 G/17G
17 POWDER, FOR SOLUTION ORAL 2 TIMES DAILY
Status: DISCONTINUED | OUTPATIENT
Start: 2018-08-29 | End: 2018-08-31 | Stop reason: HOSPADM

## 2018-08-29 RX ORDER — LORAZEPAM 2 MG/ML
0.5 INJECTION INTRAMUSCULAR ONCE
Status: COMPLETED | OUTPATIENT
Start: 2018-08-29 | End: 2018-08-29

## 2018-08-29 RX ORDER — ONDANSETRON 2 MG/ML
4 INJECTION INTRAMUSCULAR; INTRAVENOUS
Status: DISCONTINUED | OUTPATIENT
Start: 2018-08-29 | End: 2018-08-30

## 2018-08-29 RX ORDER — POLYETHYLENE GLYCOL 3350 17 G/17G
17 POWDER, FOR SOLUTION ORAL ONCE
Status: DISCONTINUED | OUTPATIENT
Start: 2018-08-29 | End: 2018-08-29

## 2018-08-29 RX ORDER — LORAZEPAM 2 MG/ML
INJECTION INTRAMUSCULAR
Status: DISPENSED
Start: 2018-08-29 | End: 2018-08-29

## 2018-08-29 RX ADMIN — SODIUM CHLORIDE 75 ML/HR: 900 INJECTION, SOLUTION INTRAVENOUS at 18:25

## 2018-08-29 RX ADMIN — Medication 10 ML: at 23:18

## 2018-08-29 RX ADMIN — SODIUM CHLORIDE 75 ML/HR: 900 INJECTION, SOLUTION INTRAVENOUS at 04:42

## 2018-08-29 RX ADMIN — IPRATROPIUM BROMIDE AND ALBUTEROL SULFATE 3 ML: .5; 3 SOLUTION RESPIRATORY (INHALATION) at 20:05

## 2018-08-29 RX ADMIN — AMLODIPINE BESYLATE 5 MG: 5 TABLET ORAL at 09:00

## 2018-08-29 RX ADMIN — OXYCODONE HYDROCHLORIDE 20 MG: 10 TABLET, FILM COATED, EXTENDED RELEASE ORAL at 19:47

## 2018-08-29 RX ADMIN — LACTULOSE 20 G: 20 SOLUTION ORAL at 11:46

## 2018-08-29 RX ADMIN — MINOCYCLINE HYDROCHLORIDE 100 MG: 50 CAPSULE ORAL at 19:48

## 2018-08-29 RX ADMIN — FAMOTIDINE 20 MG: 20 TABLET ORAL at 11:46

## 2018-08-29 RX ADMIN — IPRATROPIUM BROMIDE AND ALBUTEROL SULFATE 3 ML: .5; 3 SOLUTION RESPIRATORY (INHALATION) at 09:25

## 2018-08-29 RX ADMIN — OXYCODONE HYDROCHLORIDE 20 MG: 10 TABLET, FILM COATED, EXTENDED RELEASE ORAL at 07:39

## 2018-08-29 RX ADMIN — FOLIC ACID 1 MG: 1 TABLET ORAL at 09:01

## 2018-08-29 RX ADMIN — ONDANSETRON 4 MG: 2 INJECTION INTRAMUSCULAR; INTRAVENOUS at 23:15

## 2018-08-29 RX ADMIN — ENOXAPARIN SODIUM 40 MG: 40 INJECTION, SOLUTION INTRAVENOUS; SUBCUTANEOUS at 23:16

## 2018-08-29 RX ADMIN — ACYCLOVIR 400 MG: 200 CAPSULE ORAL at 09:01

## 2018-08-29 RX ADMIN — IPRATROPIUM BROMIDE AND ALBUTEROL SULFATE 3 ML: .5; 3 SOLUTION RESPIRATORY (INHALATION) at 13:42

## 2018-08-29 RX ADMIN — FAMOTIDINE 20 MG: 20 TABLET ORAL at 19:48

## 2018-08-29 RX ADMIN — ACETAMINOPHEN 650 MG: 325 TABLET ORAL at 19:48

## 2018-08-29 RX ADMIN — ACYCLOVIR 400 MG: 200 CAPSULE ORAL at 19:48

## 2018-08-29 RX ADMIN — LORAZEPAM 0.5 MG: 2 INJECTION INTRAMUSCULAR; INTRAVENOUS at 01:13

## 2018-08-29 RX ADMIN — DOCUSATE SODIUM 100 MG: 100 CAPSULE, LIQUID FILLED ORAL at 11:46

## 2018-08-29 RX ADMIN — Medication 10 ML: at 11:46

## 2018-08-29 RX ADMIN — POLYETHYLENE GLYCOL 3350 17 G: 17 POWDER, FOR SOLUTION ORAL at 19:48

## 2018-08-29 RX ADMIN — LEVOFLOXACIN 500 MG: 5 INJECTION, SOLUTION INTRAVENOUS at 23:17

## 2018-08-29 RX ADMIN — MINOCYCLINE HYDROCHLORIDE 100 MG: 50 CAPSULE ORAL at 09:01

## 2018-08-29 RX ADMIN — Medication 81 MG: at 09:01

## 2018-08-29 RX ADMIN — HYDROMORPHONE HYDROCHLORIDE 2 MG: 2 TABLET ORAL at 11:46

## 2018-08-29 RX ADMIN — ACETAMINOPHEN 650 MG: 325 TABLET ORAL at 12:34

## 2018-08-29 RX ADMIN — POLYETHYLENE GLYCOL 3350 17 G: 17 POWDER, FOR SOLUTION ORAL at 09:00

## 2018-08-29 NOTE — PROGRESS NOTES
Hematology-Oncology Progress Note Galilea Xavier 1955 
[id number] 
8/29/2018 Follow-up for: lung cancer [x]        Chart notes since last visit reviewed [x]        Medications reviewed for allergies and interactions Case discussed with the following:         []            
               []        Nursing Staff  
                                                                      []        Pathologist 
                                                                      [x]        FAMILY Subjective:  
 
Spoke with patient who complains of: pt. Is off oxygen breathing more easily, c/o headache for past two day, nausea this am 
 
Objective:  
 
Patient Vitals for the past 24 hrs: 
 BP Temp Pulse Resp SpO2  
08/29/18 0925 - - - - 94 % 08/29/18 0847 147/75 98.6 °F (37 °C) 79 16 96 % 08/29/18 0118 167/82 98.3 °F (36.8 °C) 71 15 96 % 08/28/18 2041 - - - - 95 % 08/28/18 2027 161/82 98.6 °F (37 °C) 69 20 98 % 08/28/18 2002 147/73 98.8 °F (37.1 °C) 70 18 92 % 08/28/18 1501 155/81 98.9 °F (37.2 °C) 68 18 93 % 08/28/18 1401 - - - - 91 % REVIEW OF SYSTEMS:   
Constitutional: negative fever, negative chills, negative weight loss Eyes:   negative visual changes ENT:   negative sore throat, tongue or lip swelling Cards:  negative for chest pain, palpitations, lower extremity edema GI:   negative for nausea, vomiting, diarrhea, and abdominal pain Neuro:  negative for headaches, dizziness, vertigo [x]                        Full ROS o/w normal/non contributor Constitutional:  Patient looks []        Sick [x]        Frail 
[]        Better                                                
[]        Depressed HEENT:  [x]   NC                         []   AT               []    ALOPECIA Eyes: [x]   Normal               []    Icteric Oropharynx: []    Normal                  []  Thrush               []   Dry 
 Mucositis: [x]    None                 Grade: []        I  []        II  []        III  []        IV Neck:   [x]   Supple                  []  Rigid JVD:    [x]   ABSENT       []   PRESENT Lymphadenopathy:   [x]   None Noted            []   PRESENT Chest: 
[x]   Clear               []    Rhonchi                      Dec'd @     [x]  Right Base           [x]   Left Base CV:             [x]   Regular              []  Irregular               []   Tachy                []   Murmur Abdominal:   [x]    Soft              []   NON-tender               []   Tender BS:    [x]   ABSENT                   []   PRESENT Liver:     [x]  NON-palp                  []   EDGE- palp Spleen: [x]   NON-palp                   []  EDGE - palp Mass:   []   ABSENT                          [x]  PRESENT Extr:    [x]  Lymphedema             []   Cyanosis      []  Clubbing Edema:     [x]   NONE       []   PRESENT Skin:  Intact [x]           Purpura [] Rash: [x]   ABSENT       []  PRESENT Neuro: 
[x]        Normal 
[]        Confused Available labs reviewed: 
Labs:   
Recent Results (from the past 24 hour(s)) NT-PRO BNP Collection Time: 08/28/18 11:26 AM  
Result Value Ref Range NT pro- (H) 0 - 125 PG/ML  
C REACTIVE PROTEIN, QT Collection Time: 08/28/18 11:26 AM  
Result Value Ref Range C-Reactive protein 5.82 (H) 0.00 - 0.60 mg/dL SED RATE (ESR) Collection Time: 08/28/18 11:26 AM  
Result Value Ref Range Sed rate, automated 83 (H) 0 - 30 mm/hr SAMPLES BEING HELD Collection Time: 08/28/18 11:26 AM  
Result Value Ref Range SAMPLES BEING HELD 1PST COMMENT Add-on orders for these samples will be processed based on acceptable specimen integrity and analyte stability, which may vary by analyte. GLUCOSE, POC Collection Time: 08/28/18 11:30 AM  
Result Value Ref Range Glucose (POC) 110 (H) 65 - 100 mg/dL Performed by Ami Morris GLUCOSE, POC  
 Collection Time: 08/28/18  6:37 PM  
Result Value Ref Range Glucose (POC) 124 (H) 65 - 100 mg/dL Performed by Felipa Eugene GLUCOSE, POC Collection Time: 08/28/18  9:13 PM  
Result Value Ref Range Glucose (POC) 118 (H) 65 - 100 mg/dL Performed by Ben Spring CBC WITH AUTOMATED DIFF Collection Time: 08/29/18  4:49 AM  
Result Value Ref Range WBC 6.9 3.6 - 11.0 K/uL  
 RBC 3.25 (L) 3.80 - 5.20 M/uL HGB 8.3 (L) 11.5 - 16.0 g/dL HCT 26.7 (L) 35.0 - 47.0 % MCV 82.2 80.0 - 99.0 FL  
 MCH 25.5 (L) 26.0 - 34.0 PG  
 MCHC 31.1 30.0 - 36.5 g/dL  
 RDW 17.8 (H) 11.5 - 14.5 % PLATELET 007 953 - 165 K/uL MPV 9.2 8.9 - 12.9 FL  
 NRBC 0.0 0  WBC ABSOLUTE NRBC 0.00 0.00 - 0.01 K/uL NEUTROPHILS 90 (H) 32 - 75 % LYMPHOCYTES 7 (L) 12 - 49 % MONOCYTES 1 (L) 5 - 13 % EOSINOPHILS 2 0 - 7 % BASOPHILS 0 0 - 1 % IMMATURE GRANULOCYTES 0 %  
 ABS. NEUTROPHILS 6.2 1.8 - 8.0 K/UL  
 ABS. LYMPHOCYTES 0.5 (L) 0.8 - 3.5 K/UL  
 ABS. MONOCYTES 0.1 0.0 - 1.0 K/UL  
 ABS. EOSINOPHILS 0.1 0.0 - 0.4 K/UL  
 ABS. BASOPHILS 0.0 0.0 - 0.1 K/UL  
 ABS. IMM. GRANS. 0.0 K/UL  
 DF SMEAR SCANNED    
 PLATELET COMMENTS Large Platelets RBC COMMENTS ANISOCYTOSIS 1+ 
    
 RBC COMMENTS OVALOCYTES PRESENT 
    
 RBC COMMENTS HYPOCHROMIA PRESENT 
    
METABOLIC PANEL, BASIC Collection Time: 08/29/18  4:49 AM  
Result Value Ref Range Sodium 136 136 - 145 mmol/L Potassium 4.5 3.5 - 5.1 mmol/L Chloride 108 97 - 108 mmol/L  
 CO2 23 21 - 32 mmol/L Anion gap 5 5 - 15 mmol/L Glucose 106 (H) 65 - 100 mg/dL BUN 19 6 - 20 MG/DL Creatinine 0.72 0.55 - 1.02 MG/DL  
 BUN/Creatinine ratio 26 (H) 12 - 20 GFR est AA >60 >60 ml/min/1.73m2 GFR est non-AA >60 >60 ml/min/1.73m2 Calcium 7.3 (L) 8.5 - 10.1 MG/DL  
GLUCOSE, POC Collection Time: 08/29/18  6:18 AM  
Result Value Ref Range Glucose (POC) 100 65 - 100 mg/dL  Performed by Vidhya Maza   
 
 
 Available Xrays reviewed: 
 
Chemotherapy monitored and toxicities assessed: 
 
Assessment and Plan 1. Met. Lung cancer, s/p first dose of alimta/avastan on 8/24. .. Too early to assess for response, counts are stable today (8/29), should have cbc's daily 2. Dyspnea. . Multi-factorial. She states that she was \"fine\" on day of chemo but became sob the next day for no apparent cause. . CTA is negative for PNA/PE/effusion. .. CT done yesterday shows atelectasis in bases, she continues jet-nebs and incentive spirometry, 
3. Headache. .. Probably secondary to htn or anti emetic. . But need to r/o brain mets. . Will check CT head today Mary Mantilla MD

## 2018-08-29 NOTE — PROGRESS NOTES
Hospitalist Progress Note Rafael Molina MD 
Answering service: [phone] OR [phone] from in house phone Cell: [phone] Date of Service:  2018 NAME:  Los Dc :  1955 MRN:  [id number] Admission Summary:  
The patient is a 48-year-old [facility] [place] female who has been diagnosed with metastatic lung cancer in 10/2017. She started Avastin last Friday. According to the daughter and son, she had been short of breath for a while; however, today, her shortness of breath became worse, so she called her oncologist who recommended that the patient should go to ER to rule out pulmonary embolism. In ER, a CTA was done, which showed progression of disease with multiple pulmonary nodules; however, no PE was noticed. The patient desaturates easily when she goes to bathroom. Her pulse ox went down to 88%. She has a chronic cough which has not changed. She has no fever or chills. She denies having any orthopnea, but states that she has noticed some leg swelling. She sleeps on 1 pillow. Interval history / Subjective: F/u respiratory distress She is not in acute distress. Denies fevers. Having constipation. Assessment & Plan: SOB/Acute hypoxic respiratory failure 
-likely sec to chemotherapy, started last Friday 
-Wean off oxygen to keep sat>92% 
-Incentive spirometry 
-Appreciate Pulmonary followup Metastatic lung cancer 
-CT chest  Progression of disease with multiple pulmonary nodules. -diagnosed in  s/p chemotherapy s/p radiation therapy 
-Recently (yesterday) started on chemotherapy (infusion) 
-On LVQ 
-CT head  as she is reporting headaches Anemia 
-sec to ?chemotherapy 
-unremarkable work up 
-monitor HTN 
- amlodipine Lupus 
-restart home meds EKG changes 
-TWI in inferior old finding 
-Echo with EF 55-6-% with no RWMA Regular diet Code status: FULL CODE 
 DVT prophylaxis: scd PTA: home Plan: Follow Oncology, pulmonary Care Plan discussed with: Patient/Family Disposition: TBD Hospital Problems  Date Reviewed: 8/25/2018 Codes Class Noted POA * (Principal)Shortness of breath ICD-10-CM: R06.02 
ICD-9-CM: 786.05  8/25/2018 Unknown Review of Systems: A comprehensive review of systems was negative except for that written in the HPI. Vital Signs:  
 Last 24hrs VS reviewed since prior progress note. Most recent are: 
Visit Vitals  /75 (BP 1 Location: Left arm, BP Patient Position: At rest)  Pulse 79  Temp 98.6 °F (37 °C)  Resp 16  
 Ht 5' 2.5\" (1.588 m)  Wt 66.9 kg (147 lb 8 oz)  SpO2 94%  Breastfeeding No  
 BMI 26.55 kg/m2 Intake/Output Summary (Last 24 hours) at 08/29/18 1447 Last data filed at 08/29/18 4583 Gross per 24 hour Intake             1579 ml Output              800 ml Net              779 ml Physical Examination:  
 
 
     
Constitutional:  No acute distress, cooperative, pleasant   
ENT:  Oral mucous moist, oropharynx benign. Neck supple, Resp:  CTA bilaterally. No wheezing/rhonchi/rales. No accessory muscle use CV:  Regular rhythm, normal rate, no murmurs, gallops, rubs GI:  Soft, non distended, non tender. normoactive bowel sounds, no hepatosplenomegaly Musculoskeletal:  No edema, warm, 2+ pulses throughout Neurologic:  Moves all extremities. AAOx3, CN II-XII reviewed Skin:  Good turgor, no rashes or ulcers Data Review:  
 Review and/or order of clinical lab test 
 
 
Labs:  
 
Recent Labs  
   08/29/18 
 0449 WBC  6.9 HGB  8.3* HCT  26.7*  
PLT  273 Recent Labs  
   08/29/18 
 0449 NA  136  
K  4.5  
CL  108 CO2  23 BUN  19  
CREA  0.72 GLU  106* CA  7.3* No results for input(s): SGOT, GPT, ALT, AP, TBIL, TBILI, TP, ALB, GLOB, GGT, AML, LPSE in the last 72 hours. No lab exists for component: AMYP, HLPSE No results for input(s): INR, PTP, APTT in the last 72 hours. No lab exists for component: INREXT, INREXT Recent Labs  
   08/28/18 
 0209 TIBC  194* PSAT  39 No results found for: FOL, RBCF No results for input(s): PH, PCO2, PO2 in the last 72 hours. No results for input(s): CPK, CKNDX, TROIQ in the last 72 hours. No lab exists for component: CPKMB No results found for: CHOL, CHOLX, CHLST, CHOLV, HDL, LDL, LDLC, DLDLP, TGLX, TRIGL, TRIGP, CHHD, CHHDX Lab Results Component Value Date/Time Glucose (POC) 104 (H) 08/29/2018 11:56 AM  
 Glucose (POC) 100 08/29/2018 06:18 AM  
 Glucose (POC) 118 (H) 08/28/2018 09:13 PM  
 Glucose (POC) 124 (H) 08/28/2018 06:37 PM  
 Glucose (POC) 110 (H) 08/28/2018 11:30 AM  
 
Lab Results Component Value Date/Time Color YELLOW/STRAW 08/25/2018 08:29 PM  
 Appearance CLEAR 08/25/2018 08:29 PM  
 Specific gravity 1.021 08/25/2018 08:29 PM  
 pH (UA) 7.0 08/25/2018 08:29 PM  
 Protein NEGATIVE  08/25/2018 08:29 PM  
 Glucose NEGATIVE  08/25/2018 08:29 PM  
 Ketone NEGATIVE  08/25/2018 08:29 PM  
 Bilirubin NEGATIVE  08/25/2018 08:29 PM  
 Urobilinogen 0.2 08/25/2018 08:29 PM  
 Nitrites NEGATIVE  08/25/2018 08:29 PM  
 Leukocyte Esterase NEGATIVE  08/25/2018 08:29 PM  
 Epithelial cells FEW 08/25/2018 08:29 PM  
 Bacteria NEGATIVE  08/25/2018 08:29 PM  
 WBC 0-4 08/25/2018 08:29 PM  
 RBC 0-5 08/25/2018 08:29 PM  
 
 
 
Medications Reviewed:  
 
Current Facility-Administered Medications Medication Dose Route Frequency  ondansetron (ZOFRAN) injection 4 mg  4 mg IntraVENous Q6H PRN  
 docusate sodium (COLACE) capsule 100 mg  100 mg Oral DAILY  polyethylene glycol (MIRALAX) packet 17 g  17 g Oral BID  lactulose (CHRONULAC) solution 20 g  30 mL Oral BID  famotidine (PEPCID) tablet 20 mg  20 mg Oral BID  acetaminophen (TYLENOL) tablet 650 mg  650 mg Oral Q6H PRN  mirtazapine (REMERON SOL-TAB) disintegrating tablet 15 mg  15 mg Oral QHS  0.9% sodium chloride infusion  75 mL/hr IntraVENous CONTINUOUS  
 oxyCODONE ER (OxyCONTIN) tablet 20 mg  20 mg Oral Q12H  
 HYDROmorphone (DILAUDID) tablet 2 mg  2 mg Oral Q4H PRN  
 amLODIPine (NORVASC) tablet 5 mg  5 mg Oral DAILY  minocycline (MINOCIN, DYNACIN) capsule 100 mg  100 mg Oral BID  
 acyclovir (ZOVIRAX) capsule 400 mg  400 mg Oral BID  aspirin delayed-release tablet 81 mg  81 mg Oral DAILY  HYDROcodone-acetaminophen (NORCO)  mg tablet 2 Tab  2 Tab Oral Q6H PRN  
 albuterol-ipratropium (DUO-NEB) 2.5 MG-0.5 MG/3 ML  3 mL Nebulization Q6H RT  
 levoFLOXacin (LEVAQUIN) 500 mg in D5W IVPB  500 mg IntraVENous Q24H  
 sodium chloride (NS) flush 5-10 mL  5-10 mL IntraVENous Q8H  
 sodium chloride (NS) flush 5-10 mL  5-10 mL IntraVENous PRN  
 enoxaparin (LOVENOX) injection 40 mg  40 mg SubCUTAneous M31B  
 folic acid (FOLVITE) tablet 1 mg  1 mg Oral DAILY  
 
______________________________________________________________________ EXPECTED LENGTH OF STAY: 3d 16h ACTUAL LENGTH OF STAY:          4 
 
            
Noelle Frank MD

## 2018-08-29 NOTE — PROGRESS NOTES
Bedside shift change report given to Jorden Villarreal (oncoming nurse) by Estephania Caruso (offgoing nurse). Report included the following information SBAR.  
 
 
8685:  Patient has had no bm after lactulose and miralax.

## 2018-08-29 NOTE — PROGRESS NOTES
Problem: Constipation - Risk of 
Goal: *Prevention of constipation Outcome: Not Progressing Towards Goal 
miralax today

## 2018-08-29 NOTE — PROGRESS NOTES
[facility] Name: Medardo Bhakta MRN: [id number] : 1955 Hospital: [facility]  Date: 2018 IMPRESSION:  
1. Acute hypoxia post chemo Tx- (Alimta/Avastin)- neg CTA/chest scan suggest bronchiectasis 2. Metastatic lung ca--> radiographic progression-- nodules enlarging 3. Abnormal CT scan: worsening bibasilar (right > left) changes with bronchiectasis on . Query aspiration vs GERD more so than XRT 4. Lifetime non smoker 5. H/o lupus with on MTX/plaquenil --->  Past \"pneumonitis\" -- We will need to track down rheumatologist  
6. ECHO on : EF 55-60%, PASP 50mmHg 7. patulent esophagus with ? chronic aspiration RECOMMENDATIONS:  
· Titrate oxygen- need to assess with walk ox before d/c · Flutter valve · CRP 5.82, Sed rate 83 
· PFT; restrictive pattern · SLP evaluation · Will need to adhere to elevation of bed at night, no food within 2-3 hours of bedtime. · Discussed with attending Radiology ( personally reviewed) CTA reviewed Has dependent atx with worsened bronchiectatic changes c/w -178 sacn 
pulm nodule w patch area GG nodularity. .. ABG No results for input(s): PHI, PO2I, PCO2I in the last 72 hours. Subjective/Interval History:  
 
: 
Dyspnea seems to wax and wean. No reported difficulties with swallowing, but notes GERD like symptoms CT scan on : with  \"Chronic appearing volume loss in both lower lobes with bronchiectasis. \" notable tumor burden as well.  
 
: I have been asked to see this pleasant lady for hypoxia/dyspnea. Dx metastatic lung ca-- adenoCA by liver bx 10/2017 Had prior XRT to lumbar spine L2 Previous on Tarceva---> then to [employer] Note traceva = <1%: Interstitial pulmonary disease 
 
(noted some LE edema) S/p chemo w Dr Conway Counter first tx Friday Had GI pain at end of infusion--> resolved SOB Saturday-->seemed to progress-->  to ED hypoxic CTA neg for PE overt pna 
rx with nebs/oxygen /LQ 
feels some better No cough Has GERD/ patulent esophagus prior EGD  
 
 
 
ROS Wt loss/anorexia LE edema past few months No cough/sputum No fever chills No orthopnea No HA/coryza No sick exposures Chronic anorexia Patient Active Problem List  
Diagnosis Code  Shortness of breath R06.02 Past Medical History:  
Diagnosis Date  Arthritis  Autoimmune disease ([facility] [address] lupus  Cancer ([facility] [address] lung cancer  Gastrointestinal disorder GERD  Hypertension History reviewed. No pertinent surgical history. Social History Social History  Marital status:  Spouse name: N/A  
 Number of children: N/A  
 Years of education: N/A Occupational History  Not on file. Social History Main Topics  Smoking status: Never Smoker  Smokeless tobacco: Never Used  Alcohol use Yes  Drug use: No  
 Sexual activity: Not on file Other Topics Concern  Not on file Social History Narrative History reviewed. No pertinent family history.  Lifetime nonsmoker FH 
+ MI/colon Ca Prior to Admission Medications Prescriptions Last Dose Informant Patient Reported? Taking? HYDROcodone-acetaminophen (NORCO)  mg tablet Not Taking at Unknown time  Yes No  
Sig: Take 2 Tabs by mouth every six (6) hours as needed for Pain. HYDROmorphone (DILAUDID) 2 mg tablet   Yes Yes Sig: Take 2 mg by mouth every four (4) hours as needed for Pain. acyclovir (ZOVIRAX) 400 mg tablet 8/25/2018 at 2100  Yes Yes Sig: Take 400 mg by mouth two (2) times a day. amLODIPine (NORVASC) 2.5 mg tablet 8/25/2018 at Unknown time  Yes Yes Sig: Take 5 mg by mouth daily. amitriptyline (ELAVIL) 10 mg tablet Not Taking at Unknown time  Yes No  
Sig: Take  by mouth nightly. aspirin delayed-release 81 mg tablet 8/25/2018 at Unknown time  Yes Yes Sig: Take 81 mg by mouth daily. calcium citrate-vitamin D3 (CITRACAL + D) tablet 8/25/2018 at Unknown time  Yes Yes Sig: Take 1 Tab by mouth two (2) times a day. estradiol (ESTRACE) 0.01 % (0.1 mg/gram) vaginal cream Not Taking at Unknown time  Yes No  
Sig: Insert 2 g into vagina daily. folic acid (FOLVITE) 1 mg tablet 8/25/2018 at Unknown time  Yes Yes Sig: Take 1 mg by mouth daily. hydroxychloroquine (PLAQUENIL) 200 mg tablet Not Taking at Unknown time  Yes No  
Sig: Take 200 mg by mouth daily. methotrexate (RHEUMATREX) 2.5 mg tablet Not Taking at Unknown time  Yes No  
Sig: Take 2.5 mg by mouth.  
minocycline (DYNACIN) 100 mg tablet 8/25/2018 at 2100  Yes Yes Sig: Take 100 mg by mouth two (2) times a day. multivitamin (ONE A DAY) tablet 8/25/2018 at Unknown time  Yes Yes Sig: Take 1 Tab by mouth daily. omeprazole (PRILOSEC) 20 mg capsule Not Taking at Unknown time  Yes No  
Sig: Take 20 mg by mouth daily. oxyCODONE ER (OXYCONTIN) 20 mg ER tablet 8/25/2018 at  1900  Yes Yes Sig: Take 20 mg by mouth every twelve (12) hours. Indications: Chronic Pain  
triamcinolone (KENALOG) 0.1 % lotion Unknown at Unknown time  Yes No  
Sig: Apply  to affected area two (2) times a day. use thin layer Facility-Administered Medications: None No Known Allergies Objective:  
 
 
Vital Signs:   
 
Patient Vitals for the past 24 hrs: 
 Temp Pulse Resp BP SpO2  
08/29/18 0925 - - - - 94 % 08/29/18 0847 98.6 °F (37 °C) 79 16 147/75 96 % 08/29/18 0118 98.3 °F (36.8 °C) 71 15 167/82 96 % 08/28/18 2041 - - - - 95 % 08/28/18 2027 98.6 °F (37 °C) 69 20 161/82 98 % 08/28/18 2002 98.8 °F (37.1 °C) 70 18 147/73 92 % 08/28/18 1501 98.9 °F (37.2 °C) 68 18 155/81 93 % 08/28/18 1401 - - - - 91 % Intake/Output:  
Last shift:        
Last 3 shifts: 08/29 0701 - 08/29 1900 In: 5871 [P.O.:240; I.V.:762] Out: 300 [Urine:300]RRIOLAST3 Intake/Output Summary (Last 24 hours) at 08/29/18 1058 Last data filed at 08/29/18 6481 Gross per 24 hour Intake             1579 ml Output             1100 ml Net              479 ml EXAM:  
 
Pleasant tired appearing BF in NAD Family at bedside NC/AT 
EOMI/ sclera anicteric Moist MM No JVD Chest bibasilar rales CV s1 s2 RRR 
abd soft, benign LE trace pedal 
nonfocal 
nml affect Skin no rash Data I have personally reviewed data, flowsheets for the last 24 hours. Labs: 
Recent Labs  
   08/29/18 
 0813 WBC  6.9 HGB  8.3* HCT  26.7*  
PLT  273 Recent Labs  
   08/29/18 
 0449 NA  136  
K  4.5  
CL  108 CO2  23 GLU  106* BUN  19  
CREA  0.72  
CA  7.3*  
 
9/2017 esophogram= IMPRESSION: 
1. Large patulous thoracic esophagus with a patulous GE junction, and no 
esophagitis, hiatal hernia or reflux demonstrated.  . 
2. Otherwise unremarkable air contrast barium swallow and upper GI for age. Peggyann Gang ECHO: 
 
Left ventricle: Systolic function was normal. Ejection fraction was 
estimated in the range of 55 % to 60 %. There were no regional wall motion 
abnormalities. Wall thickness was mildly increased. Tricuspid valve: There was mild regurgitation. Pulmonary artery systolic 
pressure was mildly to moderately increased. Pericardium: A possible, trivial pericardial effusion was identified. INDICATIONS: Abnormal EKG PROCEDURE: This was a routine study. The study included complete 2D 
imaging, M-mode, complete spectral Doppler, and color Doppler. The heart 
rate was 59 bpm, at the start of the study. Systolic blood pressure was 158 mmHg, at the start of the study. Diastolic blood pressure was 83 mmHg, 
at the start of the study. Images were obtained from the parasternal, 
apical, subcostal, and suprasternal notch acoustic windows. Image quality 
was good. LEFT VENTRICLE: Size was normal. Systolic function was normal. Ejection fraction was estimated in the range of 55 % to 60 %. There were no 
regional wall motion abnormalities. Wall thickness was mildly increased. RIGHT VENTRICLE: The size was normal. Systolic function was normal. Wall 
thickness was normal. 
 
LEFT ATRIUM: Size was normal. 
 
RIGHT ATRIUM: Size was normal. 
 
MITRAL VALVE: There was mild diffuse thickening. There was normal leaflet 
separation. AORTIC VALVE: The valve was trileaflet. Leaflets exhibited normal 
thickness and normal cuspal separation. DOPPLER: Transaortic velocity was 
within the normal range. There was no stenosis. There was no regurgitation. TRICUSPID VALVE: Normal valve structure. There was normal leaflet 
separation. DOPPLER: The transtricuspid velocity was within the normal 
range. There was no evidence for tricuspid stenosis. There was mild 
regurgitation. Pulmonary artery systolic pressure was mildly to moderately 
increased. Pulmonary artery systolic pressure: 50 mmHg. PULMONIC VALVE: Leaflets exhibited normal thickness, no calcification, and 
normal cuspal separation. DOPPLER: The transpulmonic velocity was within 
the normal range. There was no regurgitation. AORTA: The root exhibited normal size. PERICARDIUM: A possible, trivial pericardial effusion was identified. JEFERSON Galvez 
[facility]

## 2018-08-29 NOTE — PROGRESS NOTES
Problem: Dysphagia (Adult) Goal: *Acute Goals and Plan of Care (Insert Text) Speech pathology goals initiated 8/29/2018 1. Patient will tolerate a regular diet/ thin liquids free of s/s aspiration Speech LAnguage Pathology bedside swallow evaluation Patient: Javy Mendoza (2 y.o. female) Date: 8/29/2018 Primary Diagnosis: Shortness of breath Precautions: Aspiration ASSESSMENT : 
Based on the objective data described below, the patient presents with suspected functional oropharyngeal swallow. Assisted patient with lunch tray and she demonstrated timely and complete mastication of solids. Pharyngeal swallow initiation and hyolaryngeal elevation/excursion are suspected to be functional via palpation. Patient with strong cough on initial sip of thin and 1 other time throughout my session (observed with ~12 oz). Patient reports occasional cough with both liquids and solids but random and infrequent. She does confirm GERD and suspect it could be related to this. Discussed importance of small bites/sips, slow rate, strict upright positioning, and remain upright for at least an hour after eating/drinking. If increased coughing noted specifically with thins, may benefit from further workup. Patient will benefit from skilled intervention to address the above impairments. Patients rehabilitation potential is considered to be Good Factors which may influence rehabilitation potential include:  
[x]            None noted []            Mental ability/status []            Medical condition []            Home/family situation and support systems 
[]            Safety awareness 
[]            Pain tolerance/management []            Other: PLAN : 
Recommendations and Planned Interventions: 
-- Continue regular diet/ thin liquids. Straw ok in single sips -- strict upright positioning 
-- slow rate, small bites/sips -- Stay upright and remain upright for at least 60 minutes after PO 
 
 Frequency/Duration: Patient will be followed by speech-language pathology 1-2 time a week to address goals. Discharge Recommendations: None SUBJECTIVE:  
Patient stated I occasionally cough. Daughter bedside reporting coughing even in the absence of PO OBJECTIVE:  
 
Past Medical History:  
Diagnosis Date  Arthritis  Autoimmune disease ([facility] [address] lupus  Cancer ([facility] [address] lung cancer  Gastrointestinal disorder GERD  Hypertension History reviewed. No pertinent surgical history. Prior Level of Function/Home Situation: 
Home Situation Home Environment: Private residence # Steps to Enter: 3 One/Two Story Residence: Two story # of Interior Steps: 12 Height of Each Step (in): 0.5 inches Interior Rails: Both Lift Chair Available: No 
Living Alone: No 
Support Systems: Child(nany), Spouse/Significant Other/Partner Patient Expects to be Discharged to[de-identified] Private residence Current DME Used/Available at Home: None Diet prior to admission: regular/thin Current Diet:  Regular/thin  
Cognitive and Communication Status: 
Neurologic State: Alert Orientation Level: Oriented X4 Cognition: Appropriate decision making Perception: Appears intact Perseveration: No perseveration noted Safety/Judgement: Awareness of environment Oral Assessment: 
Oral Assessment Labial: No impairment Dentition: Natural 
Oral Hygiene:  (clean, moist) Lingual: No impairment Velum: Unable to visualize Mandible: No impairment P.O. Trials: 
Patient Position:  (upright in bed) Vocal quality prior to P.O.: No impairment Consistency Presented: Thin liquid; Solid How Presented: Successive swallows;Straw;Cup/sip; Self-fed/presented Bolus Acceptance: No impairment Bolus Formation/Control: No impairment Propulsion: No impairment Oral Residue: None Initiation of Swallow: No impairment Laryngeal Elevation: Functional 
Aspiration Signs/Symptoms: Strong cough (on initial sip and 1 x otherwise with ~12 oz) Pharyngeal Phase Characteristics: No impairment, issues, or problems Effective Modifications: None Oral Phase Severity: No impairment Pharyngeal Phase Severity : No impairment NOMS:  
The NOMS functional outcome measure was used to quantify this patient's level of swallowing impairment. Based on the NOMS, the patient was determined to be at level 7 for swallow function G Codes: In compliance with CMSs Claims Based Outcome Reporting, the following G-code set was chosen for this patient based the use of the NOMS functional outcome to quantify this patient's level of swallowing impairment. Using the NOMS, the patient was determined to be at level 7 for swallow function which correlates with the CH= 0% level of severity. Based on the objective assessment provided within this note, the current, goal, and discharge g-codes are as follows: 
 
Swallow  Swallowing: 
 Swallow Current Status CH= 0% 
 Swallow Goal Status CH= 0% NOMS Swallowing Levels: 
Level 1 (CN): NPO Level 2 (CM): NPO but takes consistency in therapy Level 3 (CL): Takes less than 50% of nutrition p.o. and continues with nonoral feedings; and/or safe with mod cues; and/or max diet restriction Level 4 (CK): Safe swallow but needs mod cues; and/or mod diet restriction; and/or still requires some nonoral feeding/supplements Level 5 (CJ): Safe swallow with min diet restriction; and/or needs min cues Level 6 (CI): Independent with p.o.; rare cues; usually self cues; may need to avoid some foods or needs extra time Level 7 (CH): Independent for all p.o. LILLY. (2003). National Outcomes Measurement System (NOMS): Adult Speech-Language Pathology User's Guide. Pain: 
Pain Scale 1: Numeric (0 - 10) Pain Intensity 1: 0 After treatment:  
[]            Patient left in no apparent distress sitting up in chair 
[x]            Patient left in no apparent distress in bed [x]            Call bell left within reach [x]            Nursing notified 
[]            Caregiver present 
[]            Bed alarm activated COMMUNICATION/EDUCATION:  
The patients plan of care including recommendations, planned interventions, and recommended diet changes were discussed with: Registered Nurse. []            Patient/family have participated as able in goal setting and plan of care. [x]            Patient/family agree to work toward stated goals and plan of care. []            Patient understands intent and goals of therapy, but is neutral about his/her participation. []            Patient is unable to participate in goal setting and plan of care. Thank you for this referral. 
Devi Cervantes M.S. CCC-SLP Time Calculation: 17 mins

## 2018-08-30 ENCOUNTER — APPOINTMENT (OUTPATIENT)
Dept: MRI IMAGING | Age: 63
DRG: 189 | End: 2018-08-30
Attending: HOSPITALIST
Payer: COMMERCIAL

## 2018-08-30 LAB
ANION GAP SERPL CALC-SCNC: 6 MMOL/L (ref 5–15)
BASOPHILS # BLD: 0 K/UL (ref 0–0.1)
BASOPHILS NFR BLD: 0 % (ref 0–1)
BUN SERPL-MCNC: 18 MG/DL (ref 6–20)
BUN/CREAT SERPL: 27 (ref 12–20)
CALCIUM SERPL-MCNC: 7.2 MG/DL (ref 8.5–10.1)
CHLORIDE SERPL-SCNC: 109 MMOL/L (ref 97–108)
CO2 SERPL-SCNC: 23 MMOL/L (ref 21–32)
CREAT SERPL-MCNC: 0.67 MG/DL (ref 0.55–1.02)
DIFFERENTIAL METHOD BLD: ABNORMAL
EOSINOPHIL # BLD: 0 K/UL (ref 0–0.4)
EOSINOPHIL NFR BLD: 0 % (ref 0–7)
ERYTHROCYTE [DISTWIDTH] IN BLOOD BY AUTOMATED COUNT: 17.7 % (ref 11.5–14.5)
FERRITIN SERPL-MCNC: 175 NG/ML (ref 8–252)
GLUCOSE BLD STRIP.AUTO-MCNC: 114 MG/DL (ref 65–100)
GLUCOSE BLD STRIP.AUTO-MCNC: 133 MG/DL (ref 65–100)
GLUCOSE BLD STRIP.AUTO-MCNC: 136 MG/DL (ref 65–100)
GLUCOSE BLD STRIP.AUTO-MCNC: 98 MG/DL (ref 65–100)
GLUCOSE SERPL-MCNC: 97 MG/DL (ref 65–100)
HCT VFR BLD AUTO: 24.2 % (ref 35–47)
HGB BLD-MCNC: 7.6 G/DL (ref 11.5–16)
IMM GRANULOCYTES # BLD: 0 K/UL
IMM GRANULOCYTES NFR BLD AUTO: 0 %
IRON SATN MFR SERPL: 25 % (ref 20–50)
IRON SERPL-MCNC: 47 UG/DL (ref 35–150)
LYMPHOCYTES # BLD: 0.2 K/UL (ref 0.8–3.5)
LYMPHOCYTES NFR BLD: 5 % (ref 12–49)
MCH RBC QN AUTO: 25.8 PG (ref 26–34)
MCHC RBC AUTO-ENTMCNC: 31.4 G/DL (ref 30–36.5)
MCV RBC AUTO: 82 FL (ref 80–99)
MONOCYTES # BLD: 0 K/UL (ref 0–1)
MONOCYTES NFR BLD: 1 % (ref 5–13)
NEUTS BAND NFR BLD MANUAL: 1 % (ref 0–6)
NEUTS SEG # BLD: 3.1 K/UL (ref 1.8–8)
NEUTS SEG NFR BLD: 93 % (ref 32–75)
NRBC # BLD: 0 K/UL (ref 0–0.01)
NRBC BLD-RTO: 0 PER 100 WBC
PLATELET # BLD AUTO: 213 K/UL (ref 150–400)
PMV BLD AUTO: 9.5 FL (ref 8.9–12.9)
POTASSIUM SERPL-SCNC: 4.4 MMOL/L (ref 3.5–5.1)
RBC # BLD AUTO: 2.95 M/UL (ref 3.8–5.2)
RBC MORPH BLD: ABNORMAL
SERVICE CMNT-IMP: ABNORMAL
SERVICE CMNT-IMP: NORMAL
SODIUM SERPL-SCNC: 138 MMOL/L (ref 136–145)
TIBC SERPL-MCNC: 186 UG/DL (ref 250–450)
WBC # BLD AUTO: 3.3 K/UL (ref 3.6–11)

## 2018-08-30 PROCEDURE — 74011250636 HC RX REV CODE- 250/636: Performed by: HOSPITALIST

## 2018-08-30 PROCEDURE — 85025 COMPLETE CBC W/AUTO DIFF WBC: CPT | Performed by: HOSPITALIST

## 2018-08-30 PROCEDURE — 83540 ASSAY OF IRON: CPT | Performed by: INTERNAL MEDICINE

## 2018-08-30 PROCEDURE — 74011250637 HC RX REV CODE- 250/637: Performed by: NURSE PRACTITIONER

## 2018-08-30 PROCEDURE — 74011250637 HC RX REV CODE- 250/637: Performed by: INTERNAL MEDICINE

## 2018-08-30 PROCEDURE — 74011250637 HC RX REV CODE- 250/637: Performed by: HOSPITALIST

## 2018-08-30 PROCEDURE — 70553 MRI BRAIN STEM W/O & W/DYE: CPT

## 2018-08-30 PROCEDURE — 65270000029 HC RM PRIVATE

## 2018-08-30 PROCEDURE — 82728 ASSAY OF FERRITIN: CPT | Performed by: INTERNAL MEDICINE

## 2018-08-30 PROCEDURE — 80048 BASIC METABOLIC PNL TOTAL CA: CPT | Performed by: HOSPITALIST

## 2018-08-30 PROCEDURE — 74011250636 HC RX REV CODE- 250/636: Performed by: INTERNAL MEDICINE

## 2018-08-30 PROCEDURE — 82962 GLUCOSE BLOOD TEST: CPT

## 2018-08-30 PROCEDURE — A9575 INJ GADOTERATE MEGLUMI 0.1ML: HCPCS | Performed by: HOSPITALIST

## 2018-08-30 PROCEDURE — 74011000250 HC RX REV CODE- 250: Performed by: HOSPITALIST

## 2018-08-30 PROCEDURE — 74011250637 HC RX REV CODE- 250/637: Performed by: PHYSICIAN ASSISTANT

## 2018-08-30 PROCEDURE — 36415 COLL VENOUS BLD VENIPUNCTURE: CPT | Performed by: HOSPITALIST

## 2018-08-30 PROCEDURE — 97161 PT EVAL LOW COMPLEX 20 MIN: CPT

## 2018-08-30 RX ORDER — IPRATROPIUM BROMIDE AND ALBUTEROL SULFATE 2.5; .5 MG/3ML; MG/3ML
3 SOLUTION RESPIRATORY (INHALATION)
Status: DISCONTINUED | OUTPATIENT
Start: 2018-08-30 | End: 2018-08-31 | Stop reason: HOSPADM

## 2018-08-30 RX ORDER — KETOROLAC TROMETHAMINE 30 MG/ML
30 INJECTION, SOLUTION INTRAMUSCULAR; INTRAVENOUS
Status: COMPLETED | OUTPATIENT
Start: 2018-08-30 | End: 2018-08-30

## 2018-08-30 RX ORDER — AMLODIPINE BESYLATE 5 MG/1
10 TABLET ORAL DAILY
Status: DISCONTINUED | OUTPATIENT
Start: 2018-08-31 | End: 2018-08-31 | Stop reason: HOSPADM

## 2018-08-30 RX ORDER — GADOTERATE MEGLUMINE 376.9 MG/ML
15 INJECTION INTRAVENOUS
Status: COMPLETED | OUTPATIENT
Start: 2018-08-30 | End: 2018-08-30

## 2018-08-30 RX ORDER — AMLODIPINE BESYLATE 5 MG/1
5 TABLET ORAL ONCE
Status: COMPLETED | OUTPATIENT
Start: 2018-08-30 | End: 2018-08-30

## 2018-08-30 RX ORDER — SODIUM CHLORIDE 0.9 % (FLUSH) 0.9 %
10 SYRINGE (ML) INJECTION ONCE
Status: COMPLETED | OUTPATIENT
Start: 2018-08-30 | End: 2018-08-30

## 2018-08-30 RX ORDER — PROCHLORPERAZINE MALEATE 5 MG
5 TABLET ORAL
Status: DISCONTINUED | OUTPATIENT
Start: 2018-08-30 | End: 2018-08-31 | Stop reason: HOSPADM

## 2018-08-30 RX ADMIN — DOCUSATE SODIUM 100 MG: 100 CAPSULE, LIQUID FILLED ORAL at 09:02

## 2018-08-30 RX ADMIN — AMLODIPINE BESYLATE 5 MG: 5 TABLET ORAL at 10:39

## 2018-08-30 RX ADMIN — OXYCODONE HYDROCHLORIDE 20 MG: 10 TABLET, FILM COATED, EXTENDED RELEASE ORAL at 07:26

## 2018-08-30 RX ADMIN — ACYCLOVIR 400 MG: 200 CAPSULE ORAL at 18:03

## 2018-08-30 RX ADMIN — Medication 81 MG: at 09:01

## 2018-08-30 RX ADMIN — POLYETHYLENE GLYCOL 3350 17 G: 17 POWDER, FOR SOLUTION ORAL at 09:07

## 2018-08-30 RX ADMIN — MINOCYCLINE HYDROCHLORIDE 100 MG: 50 CAPSULE ORAL at 09:00

## 2018-08-30 RX ADMIN — OXYCODONE HYDROCHLORIDE 20 MG: 10 TABLET, FILM COATED, EXTENDED RELEASE ORAL at 18:02

## 2018-08-30 RX ADMIN — HYDROMORPHONE HYDROCHLORIDE 2 MG: 2 TABLET ORAL at 14:41

## 2018-08-30 RX ADMIN — FOLIC ACID 1 MG: 1 TABLET ORAL at 09:01

## 2018-08-30 RX ADMIN — POLYETHYLENE GLYCOL 3350 17 G: 17 POWDER, FOR SOLUTION ORAL at 18:03

## 2018-08-30 RX ADMIN — MINOCYCLINE HYDROCHLORIDE 100 MG: 50 CAPSULE ORAL at 18:02

## 2018-08-30 RX ADMIN — EPOETIN ALFA 40000 UNITS: 20000 SOLUTION INTRAVENOUS; SUBCUTANEOUS at 12:09

## 2018-08-30 RX ADMIN — ACYCLOVIR 400 MG: 200 CAPSULE ORAL at 09:00

## 2018-08-30 RX ADMIN — MIRTAZAPINE 15 MG: 15 TABLET, ORALLY DISINTEGRATING ORAL at 21:06

## 2018-08-30 RX ADMIN — FAMOTIDINE 20 MG: 20 TABLET ORAL at 18:02

## 2018-08-30 RX ADMIN — Medication 10 ML: at 14:00

## 2018-08-30 RX ADMIN — AMLODIPINE BESYLATE 5 MG: 5 TABLET ORAL at 09:01

## 2018-08-30 RX ADMIN — HYDROCODONE BITARTRATE AND ACETAMINOPHEN 2 TABLET: 10; 325 TABLET ORAL at 19:32

## 2018-08-30 RX ADMIN — GADOTERATE MEGLUMINE 15 ML: 376.9 INJECTION INTRAVENOUS at 14:00

## 2018-08-30 RX ADMIN — ONDANSETRON 4 MG: 2 INJECTION INTRAMUSCULAR; INTRAVENOUS at 04:50

## 2018-08-30 RX ADMIN — Medication 10 ML: at 21:06

## 2018-08-30 RX ADMIN — KETOROLAC TROMETHAMINE 30 MG: 30 INJECTION, SOLUTION INTRAMUSCULAR; INTRAVENOUS at 12:12

## 2018-08-30 RX ADMIN — ACETAMINOPHEN 650 MG: 325 TABLET ORAL at 08:59

## 2018-08-30 RX ADMIN — Medication 10 ML: at 14:41

## 2018-08-30 RX ADMIN — FAMOTIDINE 20 MG: 20 TABLET ORAL at 09:00

## 2018-08-30 RX ADMIN — LACTULOSE 20 G: 20 SOLUTION ORAL at 09:02

## 2018-08-30 RX ADMIN — Medication 10 ML: at 12:13

## 2018-08-30 NOTE — PROGRESS NOTES
Hospitalist Progress Note Jessica Bravo MD 
Answering service: [phone] OR [phone] from in house phone Cell: [phone] Date of Service:  2018 NAME:  James SOLORZANO:  1955 MRN:  [id number] Admission Summary:  
The patient is a 79-year-old [facility] American female who has been diagnosed with metastatic lung cancer in 10/2017. She started Avastin last Friday. According to the daughter and son, she had been short of breath for a while; however, today, her shortness of breath became worse, so she called her oncologist who recommended that the patient should go to ER to rule out pulmonary embolism. In ER, a CTA was done, which showed progression of disease with multiple pulmonary nodules; however, no PE was noticed. The patient desaturates easily when she goes to bathroom. Her pulse ox went down to 88%. She has a chronic cough which has not changed. She has no fever or chills. She denies having any orthopnea, but states that she has noticed some leg swelling. She sleeps on 1 pillow. Interval history / Subjective: F/u respiratory distress She is not in acute distress. Denies fevers. Reports persistent headaches, Denies weakness. Assessment & Plan: SOB/Acute hypoxic respiratory failure 
-Multifactorial - Restrictive lung disease/?Lupus related - qualified for home oxygen at 1 liter nc continuosly 
-Wean off oxygen to keep sat>92% 
-Incentive spirometry 
-Appreciate Pulmonary follow up - no role for steroids Metastatic lung cancer 
-CT chest  Progression of disease with multiple pulmonary nodules. -diagnosed in  s/p chemotherapy s/p radiation therapy 
-Recently (yesterday) started on chemotherapy (infusion) -Finished  LVQ 
-CT head  as she is reporting headaches - negative 
-She is reporting persistent headaches DD - migraines, Hypoxia, - Check MRI brain with contrast to r/o strokes, tumors. D/W family and patient and they are in agreement Anemia 
-sec to ?chemotherapy 
-Started on Procrit 
-Check iron panel  
-Transfuse if hg<7, family are ok if so needed HTN 
- amlodipine - increased dosing Lupus 
-continue home meds EKG changes 
-TWI in inferior old finding 
-Echo with EF 55-6-% with no RWMA Regular diet Code status: FULL CODE 
DVT prophylaxis: scd PTA: home Plan: Follow Oncology, pulmonary d/c in 1-2 days if hg stable and mri ok. Would need home oxygen setup prior to dc Care Plan discussed with: Patient/Family Disposition: TBD Hospital Problems  Date Reviewed: 8/25/2018 Codes Class Noted POA * (Principal)Shortness of breath ICD-10-CM: R06.02 
ICD-9-CM: 786.05  8/25/2018 Unknown Review of Systems: A comprehensive review of systems was negative except for that written in the HPI. Vital Signs:  
 Last 24hrs VS reviewed since prior progress note. Most recent are: 
Visit Vitals  /71  Pulse 76  Temp 98.7 °F (37.1 °C)  Resp 20  
 Ht 5' 2.5\" (1.588 m)  Wt 66.9 kg (147 lb 8 oz)  SpO2 100%  Breastfeeding No  
 BMI 26.55 kg/m2 Intake/Output Summary (Last 24 hours) at 08/30/18 1022 Last data filed at 08/30/18 0013 Gross per 24 hour Intake             1781 ml Output              750 ml Net             1031 ml Physical Examination:  
 
 
     
Constitutional:  No acute distress, cooperative, pleasant   
ENT:  Oral mucous moist, oropharynx benign. Neck supple, Resp:  Decreased at bases. CTA bilaterally. No wheezing/rhonchi/rales. No accessory muscle use CV:  Regular rhythm, normal rate, no murmurs, gallops, rubs GI:  Soft, non distended, non tender. normoactive bowel sounds, no hepatosplenomegaly Musculoskeletal:  No edema, warm, 2+ pulses throughout Neurologic:  Moves all extremities. AAOx3, CN II-XII reviewed Skin:  Good turgor, no rashes or ulcers Data Review:  
 Review and/or order of clinical lab test 
 
 
Labs:  
 
Recent Labs 08/30/18 
 8277  08/29/18 
 1391 WBC  3.3*  6.9 HGB  7.6*  8.3* HCT  24.2*  26.7*  
PLT  213  273 Recent Labs 08/30/18 
 2386  08/29/18 
 0191 NA  138  136  
K  4.4  4.5  
CL  109*  108 CO2  23  23 BUN  18  19 CREA  0.67  0.72 GLU  97  106* CA  7.2*  7.3* No results for input(s): SGOT, GPT, ALT, AP, TBIL, TBILI, TP, ALB, GLOB, GGT, AML, LPSE in the last 72 hours. No lab exists for component: AMYP, HLPSE No results for input(s): INR, PTP, APTT in the last 72 hours. No lab exists for component: INREXT, INREXT Recent Labs  
   08/28/18 
 6808 TIBC  194* PSAT  39 No results found for: FOL, RBCF No results for input(s): PH, PCO2, PO2 in the last 72 hours. No results for input(s): CPK, CKNDX, TROIQ in the last 72 hours. No lab exists for component: CPKMB No results found for: CHOL, CHOLX, CHLST, CHOLV, HDL, LDL, LDLC, DLDLP, TGLX, TRIGL, TRIGP, CHHD, CHHDX Lab Results Component Value Date/Time Glucose (POC) 98 08/30/2018 06:11 AM  
 Glucose (POC) 131 (H) 08/29/2018 09:18 PM  
 Glucose (POC) 104 (H) 08/29/2018 11:56 AM  
 Glucose (POC) 100 08/29/2018 06:18 AM  
 Glucose (POC) 118 (H) 08/28/2018 09:13 PM  
 
Lab Results Component Value Date/Time  Color YELLOW/STRAW 08/25/2018 08:29 PM  
 Appearance CLEAR 08/25/2018 08:29 PM  
 Specific gravity 1.021 08/25/2018 08:29 PM  
 pH (UA) 7.0 08/25/2018 08:29 PM  
 Protein NEGATIVE  08/25/2018 08:29 PM  
 Glucose NEGATIVE  08/25/2018 08:29 PM  
 Ketone NEGATIVE  08/25/2018 08:29 PM  
 Bilirubin NEGATIVE  08/25/2018 08:29 PM  
 Urobilinogen 0.2 08/25/2018 08:29 PM  
 Nitrites NEGATIVE  08/25/2018 08:29 PM  
 Leukocyte Esterase NEGATIVE  08/25/2018 08:29 PM  
 Epithelial cells FEW 08/25/2018 08:29 PM  
 Bacteria NEGATIVE  08/25/2018 08:29 PM  
 WBC 0-4 08/25/2018 08:29 PM  
 RBC 0-5 08/25/2018 08:29 PM  
 
 
 
Medications Reviewed:  
 
Current Facility-Administered Medications Medication Dose Route Frequency  albuterol-ipratropium (DUO-NEB) 2.5 MG-0.5 MG/3 ML  3 mL Nebulization Q6H PRN  
 [START ON 8/31/2018] amLODIPine (NORVASC) tablet 10 mg  10 mg Oral DAILY  amLODIPine (NORVASC) tablet 5 mg  5 mg Oral ONCE  
 epoetin karla (EPOGEN;PROCRIT) injection 40,000 Units  40,000 Units SubCUTAneous Q7D  prochlorperazine (COMPAZINE) tablet 5 mg  5 mg Oral Q6H PRN  
 docusate sodium (COLACE) capsule 100 mg  100 mg Oral DAILY  polyethylene glycol (MIRALAX) packet 17 g  17 g Oral BID  lactulose (CHRONULAC) solution 20 g  30 mL Oral BID  famotidine (PEPCID) tablet 20 mg  20 mg Oral BID  acetaminophen (TYLENOL) tablet 650 mg  650 mg Oral Q6H PRN  mirtazapine (REMERON SOL-TAB) disintegrating tablet 15 mg  15 mg Oral QHS  
 0.9% sodium chloride infusion  75 mL/hr IntraVENous CONTINUOUS  
 oxyCODONE ER (OxyCONTIN) tablet 20 mg  20 mg Oral Q12H  
 HYDROmorphone (DILAUDID) tablet 2 mg  2 mg Oral Q4H PRN  minocycline (MINOCIN, DYNACIN) capsule 100 mg  100 mg Oral BID  
 acyclovir (ZOVIRAX) capsule 400 mg  400 mg Oral BID  aspirin delayed-release tablet 81 mg  81 mg Oral DAILY  HYDROcodone-acetaminophen (NORCO)  mg tablet 2 Tab  2 Tab Oral Q6H PRN  
 sodium chloride (NS) flush 5-10 mL  5-10 mL IntraVENous Q8H  
 sodium chloride (NS) flush 5-10 mL  5-10 mL IntraVENous PRN  
 enoxaparin (LOVENOX) injection 40 mg  40 mg SubCUTAneous H79P  
 folic acid (FOLVITE) tablet 1 mg  1 mg Oral DAILY  
 
______________________________________________________________________ EXPECTED LENGTH OF STAY: 3d 16h ACTUAL LENGTH OF STAY:          5 
 
            
Wake Ehrich, MD

## 2018-08-30 NOTE — PROGRESS NOTES
Bedside shift change report given to Aby Payan RN (oncoming nurse) by Senait Avendano RN (offgoing nurse). Report included the following information SBAR and Kardex.

## 2018-08-30 NOTE — PROGRESS NOTES
Problem: Falls - Risk of 
Goal: *Absence of Falls Document Kenishaciara Holguinlucia Fall Risk and appropriate interventions in the flowsheet. Outcome: Progressing Towards Goal 
Fall Risk Interventions: 
Mobility Interventions: Communicate number of staff needed for ambulation/transfer Medication Interventions: Patient to call before getting OOB, Evaluate medications/consider consulting pharmacy Elimination Interventions: Call light in reach History of Falls Interventions: Door open when patient unattended, Evaluate medications/consider consulting pharmacy

## 2018-08-30 NOTE — PROGRESS NOTES
[facility] Name: Natasha Rodriguez MRN: [id number] : 1955 Hospital: [facility]  Date: 2018 IMPRESSION:  
1. Acute hypoxia post chemo Tx- (Alimta/Avastin)- neg CTA/chest scan suggest bronchiectasis 2. Metastatic lung ca--> radiographic progression-- nodules enlarging 3. Abnormal CT scan: worsening bibasilar (right > left) changes with bronchiectasis on . Query aspiration vs GERD more so than XRT 4. Lifetime non smoker 5. H/o lupus with on MTX/plaquenil --->  Past \"pneumonitis\" -- We will need to track down rheumatologist  
6. ECHO on : EF 55-60%, PASP 50mmHg 7. patulent esophagus with ? chronic aspiration RECOMMENDATIONS:  
· Titrate oxygen- as able with saturations above 90% · Flutter valve · CRP 5.82, Sed rate 83 
· PFT; restrictive pattern · SLP evaluation · pepcid daily · Will need to adhere to elevation of bed at night, no food within 2-3 hours of bedtime, and avoid GERD provoking food/drinks. · At this time hold on adding steroids · Discussed with attending Radiology ( personally reviewed) CTA reviewed Has dependent atx with worsened bronchiectatic changes c/w -178 sacn 
pulm nodule w patch area GG nodularity. .. ABG No results for input(s): PHI, PO2I, PCO2I in the last 72 hours. Subjective/Interval History:  
 
: 
Dyspnea seems to wax and wean. No reported difficulties with swallowing, but notes GERD like symptoms CT scan on : with  \"Chronic appearing volume loss in both lower lobes with bronchiectasis. \" notable tumor burden as well.  
 
: I have been asked to see this pleasant lady for hypoxia/dyspnea. Dx metastatic lung ca-- adenoCA by liver bx 10/2017 Had prior XRT to lumbar spine L2 Previous on Tarceva---> then to [employer] Note traceva = <1%: Interstitial pulmonary disease 
 
(noted some LE edema) S/p chemo w Dr Patti Clarke first tx Friday Had GI pain at end of infusion--> resolved SOB Saturday-->seemed to progress-->  to ED hypoxic CTA neg for PE overt pna 
rx with nebs/oxygen /LQ 
feels some better No cough Has GERD/ patulent esophagus prior EGD  
 
 
 
ROS Wt loss/anorexia LE edema past few months No cough/sputum No fever chills No orthopnea No HA/coryza No sick exposures Chronic anorexia Patient Active Problem List  
Diagnosis Code  Shortness of breath R06.02 Past Medical History:  
Diagnosis Date  Arthritis  Autoimmune disease ([facility] lupus  Cancer ([facility] lung cancer  Gastrointestinal disorder GERD  Hypertension History reviewed. No pertinent surgical history. Social History Social History  Marital status:  Spouse name: N/A  
 Number of children: N/A  
 Years of education: N/A Occupational History  Not on file. Social History Main Topics  Smoking status: Never Smoker  Smokeless tobacco: Never Used  Alcohol use Yes  Drug use: No  
 Sexual activity: Not on file Other Topics Concern  Not on file Social History Narrative History reviewed. No pertinent family history.  Lifetime nonsmoker FH 
+ MI/colon Ca Prior to Admission Medications Prescriptions Last Dose Informant Patient Reported? Taking? HYDROcodone-acetaminophen (NORCO)  mg tablet Not Taking at Unknown time  Yes No  
Sig: Take 2 Tabs by mouth every six (6) hours as needed for Pain. HYDROmorphone (DILAUDID) 2 mg tablet   Yes Yes Sig: Take 2 mg by mouth every four (4) hours as needed for Pain. acyclovir (ZOVIRAX) 400 mg tablet 8/25/2018 at 2100  Yes Yes Sig: Take 400 mg by mouth two (2) times a day. amLODIPine (NORVASC) 2.5 mg tablet 8/25/2018 at Unknown time  Yes Yes Sig: Take 5 mg by mouth daily.   
amitriptyline (ELAVIL) 10 mg tablet Not Taking at Unknown time  Yes No  
 Sig: Take  by mouth nightly. aspirin delayed-release 81 mg tablet 8/25/2018 at Unknown time  Yes Yes Sig: Take 81 mg by mouth daily. calcium citrate-vitamin D3 (CITRACAL + D) tablet 8/25/2018 at Unknown time  Yes Yes Sig: Take 1 Tab by mouth two (2) times a day. estradiol (ESTRACE) 0.01 % (0.1 mg/gram) vaginal cream Not Taking at Unknown time  Yes No  
Sig: Insert 2 g into vagina daily. folic acid (FOLVITE) 1 mg tablet 8/25/2018 at Unknown time  Yes Yes Sig: Take 1 mg by mouth daily. hydroxychloroquine (PLAQUENIL) 200 mg tablet Not Taking at Unknown time  Yes No  
Sig: Take 200 mg by mouth daily. methotrexate (RHEUMATREX) 2.5 mg tablet Not Taking at Unknown time  Yes No  
Sig: Take 2.5 mg by mouth.  
minocycline (DYNACIN) 100 mg tablet 8/25/2018 at 2100  Yes Yes Sig: Take 100 mg by mouth two (2) times a day. multivitamin (ONE A DAY) tablet 8/25/2018 at Unknown time  Yes Yes Sig: Take 1 Tab by mouth daily. omeprazole (PRILOSEC) 20 mg capsule Not Taking at Unknown time  Yes No  
Sig: Take 20 mg by mouth daily. oxyCODONE ER (OXYCONTIN) 20 mg ER tablet 8/25/2018 at  1900  Yes Yes Sig: Take 20 mg by mouth every twelve (12) hours. Indications: Chronic Pain  
triamcinolone (KENALOG) 0.1 % lotion Unknown at Unknown time  Yes No  
Sig: Apply  to affected area two (2) times a day. use thin layer Facility-Administered Medications: None No Known Allergies Objective:  
 
 
Vital Signs:   
 
Patient Vitals for the past 24 hrs: 
 Temp Pulse Resp BP SpO2  
08/30/18 0953 - - - 149/71 -  
08/30/18 0852 98.7 °F (37.1 °C) 76 20 168/80 100 % 08/30/18 0008 99 °F (37.2 °C) 74 20 157/82 100 % 08/29/18 2005 - - - - 100 % 08/29/18 1949 99.1 °F (37.3 °C) 72 18 166/89 100 % 08/29/18 1528 99.4 °F (37.4 °C) 82 18 149/88 100 % 08/29/18 1342 - - - - 94 % Intake/Output:  
Last shift:        
Last 3 shifts: 08/30 0701 - 08/30 1900 In: 240 [P.O.:240] Out: - [id number] Intake/Output Summary (Last 24 hours) at 08/30/18 1006 Last data filed at 08/30/18 0512 Gross per 24 hour Intake             1781 ml Output              750 ml Net             1031 ml EXAM:  
 
Pleasant tired appearing BF in NAD Family at bedside NC/AT 
EOMI/ sclera anicteric Moist MM No JVD Chest bibasilar rales CV s1 s2 RRR 
abd soft, benign LE trace pedal 
nonfocal 
nml affect Skin no rash Data I have personally reviewed data, flowsheets for the last 24 hours. Labs: 
Recent Labs 08/30/18 
 4247  08/29/18 
 1419 WBC  3.3*  6.9 HGB  7.6*  8.3* HCT  24.2*  26.7*  
PLT  213  273 Recent Labs 08/30/18 
 1810  08/29/18 
 3633 NA  138  136  
K  4.4  4.5  
CL  109*  108 CO2  23  23 GLU  97  106* BUN  18  19 CREA  0.67  0.72  
CA  7.2*  7.3*  
 
9/2017 esophogram= IMPRESSION: 
1. Large patulous thoracic esophagus with a patulous GE junction, and no 
esophagitis, hiatal hernia or reflux demonstrated.  . 
2. Otherwise unremarkable air contrast barium swallow and upper GI for age. Destiny Payne ECHO: 
 
Left ventricle: Systolic function was normal. Ejection fraction was 
estimated in the range of 55 % to 60 %. There were no regional wall motion 
abnormalities. Wall thickness was mildly increased. Tricuspid valve: There was mild regurgitation. Pulmonary artery systolic 
pressure was mildly to moderately increased. Pericardium: A possible, trivial pericardial effusion was identified. INDICATIONS: Abnormal EKG PROCEDURE: This was a routine study. The study included complete 2D 
imaging, M-mode, complete spectral Doppler, and color Doppler. The heart 
rate was 59 bpm, at the start of the study. Systolic blood pressure was 158 mmHg, at the start of the study. Diastolic blood pressure was 83 mmHg, 
at the start of the study. Images were obtained from the parasternal, 
apical, subcostal, and suprasternal notch acoustic windows. Image quality 
was good. LEFT VENTRICLE: Size was normal. Systolic function was normal. Ejection 
fraction was estimated in the range of 55 % to 60 %. There were no 
regional wall motion abnormalities. Wall thickness was mildly increased. RIGHT VENTRICLE: The size was normal. Systolic function was normal. Wall 
thickness was normal. 
 
LEFT ATRIUM: Size was normal. 
 
RIGHT ATRIUM: Size was normal. 
 
MITRAL VALVE: There was mild diffuse thickening. There was normal leaflet 
separation. AORTIC VALVE: The valve was trileaflet. Leaflets exhibited normal 
thickness and normal cuspal separation. DOPPLER: Transaortic velocity was 
within the normal range. There was no stenosis. There was no regurgitation. TRICUSPID VALVE: Normal valve structure. There was normal leaflet 
separation. DOPPLER: The transtricuspid velocity was within the normal 
range. There was no evidence for tricuspid stenosis. There was mild 
regurgitation. Pulmonary artery systolic pressure was mildly to moderately 
increased. Pulmonary artery systolic pressure: 50 mmHg. PULMONIC VALVE: Leaflets exhibited normal thickness, no calcification, and 
normal cuspal separation. DOPPLER: The transpulmonic velocity was within 
the normal range. There was no regurgitation. AORTA: The root exhibited normal size. PERICARDIUM: A possible, trivial pericardial effusion was identified. JEFERSON Bautista 
[facility]

## 2018-08-30 NOTE — PROGRESS NOTES
Problem: Dysphagia (Adult) Goal: *Acute Goals and Plan of Care (Insert Text) Speech pathology goals initiated 8/29/2018 1. Patient will tolerate a regular diet/ thin liquids free of s/s aspiration MET 8/30 Speech Pathology Chart reviewed. Spoke with both patient and RN who both report no swallowing difficulty today. Patient denies any coughing/choking at meals. Discussed concern for esophageal dysphagia/GERD based on SLP findings from yesterday and reviewed reflux precautions. Patient verbalized understanding. No further skilled dysphagia services recommended at this time; signing off. Please reconsult as needed. RN made aware. Kandy Avila MS, CCC-SLP, BCS-S Total time: 8 minutes

## 2018-08-30 NOTE — PROGRESS NOTES
Spiritual Care Partner Volunteer visited patient in room 603 on 8.30.18. Documented by: : Rev. Carmelita Branham. Gracie Darden; [facility], to contact [phone] [address] call: [phone]

## 2018-08-30 NOTE — PROGRESS NOTES
physical Therapy EVALUATION/DISCHARGE Patient: Chetan Dexter (89 y.o. female) Date: 8/30/2018 Primary Diagnosis: Shortness of breath Precautions:     
ASSESSMENT : 
Based on the objective data described below, the patient presents with overall independent mobility demonstrating safe bed mobility, transfers and gait. Patient was monitored for oxygen needs as well. Received on room air at rest in bed with sats 92%. Ambulated in hu[place] on room air ~200 feet before oxygen sats dropped to 86%. Added one liter O2 and sats increased to 92-94% walking. Overall mobility is good and no recommendations for follow up therapy at this time. Did encourage her continue with mobility at home on regular basis. Skilled physical therapy is not indicated at this time. PLAN : 
Discharge Recommendations: None Further Equipment Recommendations for Discharge: portable oxygen SUBJECTIVE:  
Patient stated I have a head ache.  OBJECTIVE DATA SUMMARY:  
HISTORY:   
Past Medical History:  
Diagnosis Date  Arthritis  Autoimmune disease ([facility] [address] lupus  [facility] cancer  Gastrointestinal disorder GERD  Hypertension History reviewed. No pertinent surgical history. Prior Level of Function/Home Situation: independent Personal factors and/or comorbidities impacting plan of care:  
 
Home Situation Home Environment: Private residence # Steps to Enter: 3 Rails to Enter: No 
One/Two Story Residence: Two story, live on 1st floor # of Interior Steps: 12 Height of Each Step (in): 0.5 inches Interior Rails: Both Lift Chair Available: No 
Living Alone: No 
Support Systems: Child(nany), Spouse/Significant Other/Partner Patient Expects to be Discharged to[de-identified] Private residence Current DME Used/Available at Home: None EXAMINATION/PRESENTATION/DECISION MAKING:  
Critical Behavior: 
Neurologic State: Alert, Appropriate for age Orientation Level: Oriented X4 
 Cognition: Appropriate decision making, Appropriate for age attention/concentration, Appropriate safety awareness, Follows commands Safety/Judgement: Awareness of environment Hearing: Auditory Auditory Impairment: None Range Of Motion: 
AROM: Within functional limits (limited RUE shoulder) PROM: Within functional limits Strength:   
Strength: Within functional limits Tone & Sensation:  
Tone: Normal 
  
 Sensation: Intact Coordination: 
Coordination: Within functional limits Functional Mobility: 
Bed Mobility: 
Rolling: Independent Supine to Sit: Independent Sit to Supine: Independent Transfers: 
Sit to Stand: Independent Stand to Sit: Independent Balance:  
Sitting: Intact Standing: Intact Ambulation/Gait Training: 
Distance (ft): 300 Feet (ft) Ambulation - Level of Assistance: Independent Gait Description (WDL): Exceptions to [facility] Functional Measure: 
Tinetti test: 
 
Sitting Balance: 1 Arises: 1 Attempts to Rise: 2 Immediate Standing Balance: 2 Standing Balance: 2 Nudged: 2 Eyes Closed: 1 Turn 360 Degrees - Continuous/Discontinuous: 1 Turn 360 Degrees - Steady/Unsteady: 1 Sitting Down: 2 Balance Score: 15 Indication of Gait: 1 
R Step Length/Height: 1 L Step Length/Height: 1 
R Foot Clearance: 1 L Foot Clearance: 1 Step Symmetry: 1 Step Continuity: 1 Path: 2 Trunk: 2 Walking Time: 1 Gait Score: 12 Total Score: 27 Tinetti Test and G-code impairment scale: 
Percentage of Impairment CH 
 
0% 
 CI 
 
1-19% CJ 
 
20-39% CK 
 
40-59% CL 
 
60-79% CM 
 
80-99% CN  
 
100% Tinetti Score 0-28 28 23-27 17-22 12-16 6-11 1-5 0 Tinetti Tool Score Risk of Falls 
<19 = High Fall Risk 19-24 = Moderate Fall Risk 25-28 = Low Fall Risk Tinetti ME. Performance-Oriented Assessment of Mobility Problems in Elderly Patients. Henson 66; [id number]. (Scoring Description: PT Bulletin Feb. 10, 1993) Older adults: Verner Nevin et al, 2009; n = [address] [facility] elderly evaluated with ABC, MALACHI, ADL, and IADL) · Mean MALACHI score for males aged 69-68 years = 26.21(3.40) · Mean MALACHI score for females age 69-68 years = 25.16(4.30) · Mean MALACHI score for males over 80 years = 23.29(6.02) · Mean MALACHI score for females over 80 years = 17.20(8.32) G codes: In compliance with CMSs Claims Based Outcome Reporting, the following G-code set was chosen for this patient based on their primary functional limitation being treated: The outcome measure chosen to determine the severity of the functional limitation was the Tinetti with a score of 27/28 which was correlated with the impairment scale. ? Mobility - Walking and Moving Around:  
  - CURRENT STATUS: CI - 1%-19% impaired, limited or restricted  - GOAL STATUS: CI - 1%-19% impaired, limited or restricted  - D/C STATUS:  CI - 1%-19% impaired, limited or restricted Physical Therapy Evaluation Charge Determination History Examination Presentation Decision-Making MEDIUM  Complexity : 1-2 comorbidities / personal factors will impact the outcome/ POC  LOW Complexity : 1-2 Standardized tests and measures addressing body structure, function, activity limitation and / or participation in recreation  LOW Complexity : Stable, uncomplicated  Other outcome measures Tinetti  LOW Based on the above components, the patient evaluation is determined to be of the following complexity level: LOW Pain: 
Pain Scale 1: Numeric (0 - 10) Pain Intensity 1: 5 Activity Tolerance:  
Pulse Oximetry Assessment 92% at rest on room air 86% while ambulating on room air 100% at rest on 1LPM 
92% while ambulating on 1LPM 
 
Please refer to the flowsheet for vital signs taken during this treatment. After treatment:  
[]   Patient left in no apparent distress sitting up in chair 
[x]   Patient left in no apparent distress in bed [x]   Call bell left within reach [x]   Nursing notified 
[]   Caregiver present 
[]   Bed alarm activated COMMUNICATION/EDUCATION:  
Communication/Collaboration: 
[]   Fall prevention education was provided and the patient/caregiver indicated understanding. [x]   Patient/family have participated as able and agree with findings and recommendations. []   Patient is unable to participate in plan of care at this time. Findings and recommendations were discussed with: Registered Nurse and Physician Thank you for this referral. 
Liyah Gill, PT Time Calculation: 22 mins

## 2018-08-30 NOTE — PROGRESS NOTES
Bedside and Verbal shift change report given to Aden Peters (oncoming nurse) by Ky Guardado (offgoing nurse). Report included the following information SBAR, MAR and Recent Results.

## 2018-08-30 NOTE — PROGRESS NOTES
Hematology-Oncology Progress Note Sor[facility] Swain 1955 
[id number] 
8/30/2018 Follow-up for: lung cancer [x]        Chart notes since last visit reviewed [x]        Medications reviewed for allergies and interactions Case discussed with the following:         []            
               [x]        Nursing Staff  
                                                                      []        Pathologist 
                                                                      [x]        FAMILY Subjective:  
 
Spoke with patient who complains of: pt. Is back on oxygen, breathing comfortably, still with headache Objective:  
 
Patient Vitals for the past 24 hrs: 
 BP Temp Pulse Resp SpO2  
08/30/18 0852 168/80 98.7 °F (37.1 °C) 76 20 100 % 08/30/18 0008 157/82 99 °F (37.2 °C) 74 20 100 % 08/29/18 2005 - - - - 100 % 08/29/18 1949 166/89 99.1 °F (37.3 °C) 72 18 100 % 08/29/18 1528 149/88 99.4 °F (37.4 °C) 82 18 100 % 08/29/18 1342 - - - - 94 % REVIEW OF SYSTEMS:   
Constitutional: negative fever, negative chills, negative weight loss Eyes:   negative visual changes ENT:   negative sore throat, tongue or lip swelling Cards:  negative for chest pain, palpitations, lower extremity edema GI:   negative for nausea, vomiting, diarrhea, and abdominal pain Neuro:  negative for headaches, dizziness, vertigo [x]                        Full ROS o/w normal/non contributor Constitutional:  Patient looks []        Sick [x]        Frail 
[]        Better                                                
[]        Depressed HEENT:  [x]   NC                         []   AT               []    ALOPECIA Eyes: [x]   Normal               []    Icteric Oropharynx: []    Normal                  []  Thrush               []   Dry Mucositis: [x]    None                 Grade: []        I  []        II  []        III  []        IV 
 Neck:   [x]   Supple                  []  Rigid JVD:    [x]   ABSENT       []   PRESENT Lymphadenopathy:   [x]   None Noted            []   PRESENT Chest: 
[x]   Clear               []    Rhonchi                      Dec'd @     [x]  Right Base           [x]   Left Base CV:             [x]   Regular              []  Irregular               []   Tachy                []   Murmur Abdominal:   [x]    Soft              []   NON-tender               []   Tender BS:    [x]   ABSENT                   []   PRESENT Liver:     [x]  NON-palp                  []   EDGE- palp Spleen: [x]   NON-palp                   []  EDGE - palp Mass:   []   ABSENT                          [x]  PRESENT Extr:    [x]  Lymphedema             []   Cyanosis      []  Clubbing Edema:     [x]   NONE       []   PRESENT Skin:  Intact [x]           Purpura [] Rash: [x]   ABSENT       []  PRESENT Neuro: 
[x]        Normal 
[]        Confused Available labs reviewed: 
Labs:   
Recent Results (from the past 24 hour(s)) GLUCOSE, POC Collection Time: 08/29/18 11:56 AM  
Result Value Ref Range Glucose (POC) 104 (H) 65 - 100 mg/dL Performed by Tamiko Hansen OCCULT BLOOD, STOOL Collection Time: 08/29/18  4:47 PM  
Result Value Ref Range Occult blood, stool NEGATIVE  NEG    
GLUCOSE, POC Collection Time: 08/29/18  9:18 PM  
Result Value Ref Range Glucose (POC) 131 (H) 65 - 100 mg/dL Performed by RUMA CARTER   
CBC WITH AUTOMATED DIFF Collection Time: 08/30/18  4:18 AM  
Result Value Ref Range WBC 3.3 (L) 3.6 - 11.0 K/uL  
 RBC 2.95 (L) 3.80 - 5.20 M/uL HGB 7.6 (L) 11.5 - 16.0 g/dL HCT 24.2 (L) 35.0 - 47.0 % MCV 82.0 80.0 - 99.0 FL  
 MCH 25.8 (L) 26.0 - 34.0 PG  
 MCHC 31.4 30.0 - 36.5 g/dL  
 RDW 17.7 (H) 11.5 - 14.5 % PLATELET 491 470 - 172 K/uL MPV 9.5 8.9 - 12.9 FL  
 NRBC 0.0 0  WBC ABSOLUTE NRBC 0.00 0.00 - 0.01 K/uL NEUTROPHILS 93 (H) 32 - 75 % BAND NEUTROPHILS 1 0 - 6 % LYMPHOCYTES 5 (L) 12 - 49 % MONOCYTES 1 (L) 5 - 13 % EOSINOPHILS 0 0 - 7 % BASOPHILS 0 0 - 1 % IMMATURE GRANULOCYTES 0 %  
 ABS. NEUTROPHILS 3.1 1.8 - 8.0 K/UL  
 ABS. LYMPHOCYTES 0.2 (L) 0.8 - 3.5 K/UL  
 ABS. MONOCYTES 0.0 0.0 - 1.0 K/UL  
 ABS. EOSINOPHILS 0.0 0.0 - 0.4 K/UL  
 ABS. BASOPHILS 0.0 0.0 - 0.1 K/UL  
 ABS. IMM. GRANS. 0.0 K/UL  
 DF MANUAL    
 RBC COMMENTS ANISOCYTOSIS 1+ 
    
 RBC COMMENTS HYPOCHROMIA 1+ 
    
 RBC COMMENTS OVALOCYTES PRESENT 
    
METABOLIC PANEL, BASIC Collection Time: 08/30/18  4:18 AM  
Result Value Ref Range Sodium 138 136 - 145 mmol/L Potassium 4.4 3.5 - 5.1 mmol/L Chloride 109 (H) 97 - 108 mmol/L  
 CO2 23 21 - 32 mmol/L Anion gap 6 5 - 15 mmol/L Glucose 97 65 - 100 mg/dL BUN 18 6 - 20 MG/DL Creatinine 0.67 0.55 - 1.02 MG/DL  
 BUN/Creatinine ratio 27 (H) 12 - 20 GFR est AA >60 >60 ml/min/1.73m2 GFR est non-AA >60 >60 ml/min/1.73m2 Calcium 7.2 (L) 8.5 - 10.1 MG/DL  
GLUCOSE, POC Collection Time: 08/30/18  6:11 AM  
Result Value Ref Range Glucose (POC) 98 65 - 100 mg/dL Performed by Max Ortiz Available Xrays reviewed: 
 
Chemotherapy monitored and toxicities assessed: 
 
Assessment and Plan 1. Met. Lung cancer, s/p first dose of alimta/avastan on 8/24. .. Too early to assess for response, counts are stable today (8/29), should have cbc's daily 2. Dyspnea. . Multi-factorial. She states that she was \"fine\" on day of chemo but became sob the next day for no apparent cause. . CTA is negative for PNA/PE/effusion. .. CT done yesterday shows atelectasis in bases, she continues jet-nebs and incentive spirometry,, it looks like she will need home oxygen, 
3. Headache. .ct brain negative for mets/bleed 8/29. ... Probably secondary to htn or anti emetic. Nela Wolfe 4. Htn,  Will increase norvasc to 10mg/d 5. Anemia. . Multifactorial, will check stool study, iron panel. Am cbc, begin procrit 6.  
7. MD Lin Shukla MD

## 2018-08-30 NOTE — PROGRESS NOTES
Bedside shift change report given to Stephany Christiansen RN (oncoming nurse) by Julito Bowens RN (offgoing nurse). Report included the following information SBAR and Kardex.

## 2018-08-31 VITALS
SYSTOLIC BLOOD PRESSURE: 138 MMHG | DIASTOLIC BLOOD PRESSURE: 72 MMHG | OXYGEN SATURATION: 100 % | HEIGHT: 63 IN | RESPIRATION RATE: 16 BRPM | TEMPERATURE: 98.9 F | BODY MASS INDEX: 26.13 KG/M2 | HEART RATE: 68 BPM | WEIGHT: 147.5 LBS

## 2018-08-31 LAB
ANION GAP SERPL CALC-SCNC: 10 MMOL/L (ref 5–15)
BASOPHILS # BLD: 0 K/UL (ref 0–0.1)
BASOPHILS NFR BLD: 1 % (ref 0–1)
BUN SERPL-MCNC: 18 MG/DL (ref 6–20)
BUN/CREAT SERPL: 27 (ref 12–20)
CALCIUM SERPL-MCNC: 7.7 MG/DL (ref 8.5–10.1)
CHLORIDE SERPL-SCNC: 108 MMOL/L (ref 97–108)
CO2 SERPL-SCNC: 21 MMOL/L (ref 21–32)
CREAT SERPL-MCNC: 0.67 MG/DL (ref 0.55–1.02)
DIFFERENTIAL METHOD BLD: ABNORMAL
EOSINOPHIL # BLD: 0.1 K/UL (ref 0–0.4)
EOSINOPHIL NFR BLD: 3 % (ref 0–7)
ERYTHROCYTE [DISTWIDTH] IN BLOOD BY AUTOMATED COUNT: 17.5 % (ref 11.5–14.5)
GLUCOSE BLD STRIP.AUTO-MCNC: 119 MG/DL (ref 65–100)
GLUCOSE BLD STRIP.AUTO-MCNC: 94 MG/DL (ref 65–100)
GLUCOSE SERPL-MCNC: 101 MG/DL (ref 65–100)
HCT VFR BLD AUTO: 25.3 % (ref 35–47)
HGB BLD-MCNC: 7.9 G/DL (ref 11.5–16)
IMM GRANULOCYTES # BLD: 0 K/UL (ref 0–0.04)
IMM GRANULOCYTES NFR BLD AUTO: 1 % (ref 0–0.5)
LYMPHOCYTES # BLD: 0.4 K/UL (ref 0.8–3.5)
LYMPHOCYTES NFR BLD: 21 % (ref 12–49)
MCH RBC QN AUTO: 25.4 PG (ref 26–34)
MCHC RBC AUTO-ENTMCNC: 31.2 G/DL (ref 30–36.5)
MCV RBC AUTO: 81.4 FL (ref 80–99)
MONOCYTES # BLD: 0.3 K/UL (ref 0–1)
MONOCYTES NFR BLD: 14 % (ref 5–13)
NEUTS SEG # BLD: 1.3 K/UL (ref 1.8–8)
NEUTS SEG NFR BLD: 60 % (ref 32–75)
NRBC # BLD: 0 K/UL (ref 0–0.01)
NRBC BLD-RTO: 0 PER 100 WBC
PLATELET # BLD AUTO: 203 K/UL (ref 150–400)
PMV BLD AUTO: 9.7 FL (ref 8.9–12.9)
POTASSIUM SERPL-SCNC: 4.1 MMOL/L (ref 3.5–5.1)
RBC # BLD AUTO: 3.11 M/UL (ref 3.8–5.2)
RBC MORPH BLD: ABNORMAL
SERVICE CMNT-IMP: ABNORMAL
SERVICE CMNT-IMP: NORMAL
SODIUM SERPL-SCNC: 139 MMOL/L (ref 136–145)
WBC # BLD AUTO: 2.1 K/UL (ref 3.6–11)

## 2018-08-31 PROCEDURE — 74011250637 HC RX REV CODE- 250/637: Performed by: HOSPITALIST

## 2018-08-31 PROCEDURE — 82962 GLUCOSE BLOOD TEST: CPT

## 2018-08-31 PROCEDURE — 80048 BASIC METABOLIC PNL TOTAL CA: CPT | Performed by: HOSPITALIST

## 2018-08-31 PROCEDURE — 74011250636 HC RX REV CODE- 250/636: Performed by: HOSPITALIST

## 2018-08-31 PROCEDURE — 74011000250 HC RX REV CODE- 250: Performed by: HOSPITALIST

## 2018-08-31 PROCEDURE — 74011250637 HC RX REV CODE- 250/637: Performed by: PHYSICIAN ASSISTANT

## 2018-08-31 PROCEDURE — 94760 N-INVAS EAR/PLS OXIMETRY 1: CPT

## 2018-08-31 PROCEDURE — 85025 COMPLETE CBC W/AUTO DIFF WBC: CPT | Performed by: HOSPITALIST

## 2018-08-31 PROCEDURE — 77010033678 HC OXYGEN DAILY

## 2018-08-31 PROCEDURE — 74011250637 HC RX REV CODE- 250/637: Performed by: NURSE PRACTITIONER

## 2018-08-31 PROCEDURE — 36415 COLL VENOUS BLD VENIPUNCTURE: CPT | Performed by: HOSPITALIST

## 2018-08-31 PROCEDURE — 74011250637 HC RX REV CODE- 250/637: Performed by: INTERNAL MEDICINE

## 2018-08-31 RX ORDER — ACETAMINOPHEN 325 MG/1
650 TABLET ORAL
Qty: 30 TAB | Refills: 0 | Status: SHIPPED | OUTPATIENT
Start: 2018-08-31

## 2018-08-31 RX ORDER — MIRTAZAPINE 15 MG/1
15 TABLET, ORALLY DISINTEGRATING ORAL
Qty: 30 TAB | Refills: 0 | Status: SHIPPED | OUTPATIENT
Start: 2018-08-31

## 2018-08-31 RX ORDER — AMLODIPINE BESYLATE 10 MG/1
10 TABLET ORAL DAILY
Qty: 30 TAB | Refills: 2 | Status: SHIPPED | OUTPATIENT
Start: 2018-09-01

## 2018-08-31 RX ADMIN — OXYCODONE HYDROCHLORIDE 20 MG: 10 TABLET, FILM COATED, EXTENDED RELEASE ORAL at 06:49

## 2018-08-31 RX ADMIN — HYDROCODONE BITARTRATE AND ACETAMINOPHEN 2 TABLET: 10; 325 TABLET ORAL at 02:03

## 2018-08-31 RX ADMIN — ENOXAPARIN SODIUM 40 MG: 40 INJECTION, SOLUTION INTRAVENOUS; SUBCUTANEOUS at 00:13

## 2018-08-31 RX ADMIN — Medication 81 MG: at 09:24

## 2018-08-31 RX ADMIN — FOLIC ACID 1 MG: 1 TABLET ORAL at 09:24

## 2018-08-31 RX ADMIN — DOCUSATE SODIUM 100 MG: 100 CAPSULE, LIQUID FILLED ORAL at 09:00

## 2018-08-31 RX ADMIN — Medication 10 ML: at 06:49

## 2018-08-31 RX ADMIN — PROCHLORPERAZINE MALEATE 5 MG: 5 TABLET, FILM COATED ORAL at 02:03

## 2018-08-31 RX ADMIN — POLYETHYLENE GLYCOL 3350 17 G: 17 POWDER, FOR SOLUTION ORAL at 09:26

## 2018-08-31 RX ADMIN — AMLODIPINE BESYLATE 10 MG: 5 TABLET ORAL at 09:25

## 2018-08-31 RX ADMIN — LACTULOSE 20 G: 20 SOLUTION ORAL at 09:26

## 2018-08-31 RX ADMIN — FAMOTIDINE 20 MG: 20 TABLET ORAL at 09:26

## 2018-08-31 RX ADMIN — ACYCLOVIR 400 MG: 200 CAPSULE ORAL at 09:23

## 2018-08-31 RX ADMIN — MINOCYCLINE HYDROCHLORIDE 100 MG: 50 CAPSULE ORAL at 09:24

## 2018-08-31 RX ADMIN — HYDROCODONE BITARTRATE AND ACETAMINOPHEN 2 TABLET: 10; 325 TABLET ORAL at 09:22

## 2018-08-31 NOTE — PROGRESS NOTES
Bedside shift change report given to Muriel Hughes RN (oncoming nurse) by Estrellita Shah RN (offgoing nurse). Report included the following information SBAR and Kardex.

## 2018-08-31 NOTE — DISCHARGE INSTRUCTIONS
Shortness of Breath: Care Instructions  Your Care Instructions  Shortness of breath has many causes. Sometimes conditions such as anxiety can lead to shortness of breath. Some people get mild shortness of breath when they exercise. Trouble breathing also can be a symptom of a serious problem, such as asthma, lung disease, emphysema, heart problems, and pneumonia. If your shortness of breath continues, you may need tests and treatment. Watch for any changes in your breathing and other symptoms. Follow-up care is a key part of your treatment and safety. Be sure to make and go to all appointments, and call your doctor if you are having problems. It's also a good idea to know your test results and keep a list of the medicines you take. How can you care for yourself at home? · Do not smoke or allow others to smoke around you. If you need help quitting, talk to your doctor about stop-smoking programs and medicines. These can increase your chances of quitting for good. · Get plenty of rest and sleep. · Take your medicines exactly as prescribed. Call your doctor if you think you are having a problem with your medicine. · Find healthy ways to deal with stress. ¨ Exercise daily. ¨ Get plenty of sleep. ¨ Eat regularly and well. When should you call for help? Call 911 anytime you think you may need emergency care. For example, call if:    · You have severe shortness of breath.     · You have symptoms of a heart attack. These may include:  ¨ Chest pain or pressure, or a strange feeling in the chest.  ¨ Sweating. ¨ Shortness of breath. ¨ Nausea or vomiting. ¨ Pain, pressure, or a strange feeling in the back, neck, jaw, or upper belly or in one or both shoulders or arms. ¨ Lightheadedness or sudden weakness. ¨ A fast or irregular heartbeat. After you call 911, the  may tell you to chew 1 adult-strength or 2 to 4 low-dose aspirin. Wait for an ambulance.  Do not try to drive yourself.    Call your doctor now or seek immediate medical care if:    · Your shortness of breath gets worse or you start to wheeze. Wheezing is a high-pitched sound when you breathe.     · You wake up at night out of breath or have to prop your head up on several pillows to breathe.     · You are short of breath after only light activity or while at rest.    Watch closely for changes in your health, and be sure to contact your doctor if:    · You do not get better over the next 1 to 2 days. Where can you learn more? Go to http://jami-jose alfredo.info/. Enter S780 in the search box to learn more about \"Shortness of Breath: Care Instructions. \"  Current as of: 2017  Content Version: 11.7  © 6130-4131 [employer]. Care instructions adapted under license by [employer] (which disclaims liability or warranty for this information). If you have questions about a medical condition or this instruction, always ask your healthcare professional. [place] [address] any warranty or liability for your use of this information. Discharge Instructions       PATIENT ID: Natalio Martin  MRN: [id number]   YOB: 1955    DATE OF ADMISSION: 2018  6:38 PM    DATE OF DISCHARGE: 2018    PRIMARY CARE PROVIDER: Sina Echols MD       DISCHARGING PHYSICIAN: Ada Apgar, MD    To contact this individual call [phone] and ask the  to page. If unavailable ask to be transferred the Adult Hospitalist Department.     DISCHARGE DIAGNOSES Hypoxic respiratory failure, Metastatic lung cancer    CONSULTATIONS: IP CONSULT TO HEMATOLOGY  IP CONSULT TO PULMONOLOGY    PROCEDURES/SURGERIES: * No surgery found *    PENDING TEST RESULTS:   At the time of discharge the following test results are still pending:     FOLLOW UP APPOINTMENTS:   Follow-up Information     Follow up With Details Comments Contact Info    Misha Alcantara MD   [address] [place] [address] [facility] [address]  [phone]             ADDITIONAL CARE RECOMMENDATIONS: 1 liter of oxygen via nasal cannula during ambulation    DIET: Cardiac Diet    ACTIVITY: Activity as tolerated    WOUND CARE:     EQUIPMENT needed:       DISCHARGE MEDICATIONS:   See Medication Reconciliation Form    · It is important that you take the medication exactly as they are prescribed. · Keep your medication in the bottles provided by the pharmacist and keep a list of the medication names, dosages, and times to be taken in your wallet. · Do not take other medications without consulting your doctor. NOTIFY YOUR PHYSICIAN FOR ANY OF THE FOLLOWING:   Fever over 101 degrees for 24 hours. Chest pain, shortness of breath, fever, chills, nausea, vomiting, diarrhea, change in mentation, falling, weakness, bleeding. Severe pain or pain not relieved by medications. Or, any other signs or symptoms that you may have questions about.       DISPOSITION:   x Home With:   OT  PT  HH  RN       SNF/Inpatient Rehab/LTAC    Independent/assisted living    Hospice    Other:     CDMP Checked:   Yes x       Signed:   Marie Morris MD  8/31/2018  12:42 PM

## 2018-08-31 NOTE — PROGRESS NOTES
Hematology-Oncology Progress Note Galilea Xavier 1955 
[id number] 
8/31/2018 Follow-up for: lung cancer [x]        Chart notes since last visit reviewed [x]        Medications reviewed for allergies and interactions Case discussed with the following:         []            
               []        Nursing Staff  
                                                                      []        Pathologist 
                                                                      [x]        FAMILY Subjective:  
 
Spoke with patient who complains of: pt. Did well overnight, breathing comfortably,  
 
Objective:  
 
Patient Vitals for the past 24 hrs: 
 BP Temp Pulse Resp SpO2  
08/31/18 0854 165/90 98 °F (36.7 °C) 70 16 97 % 08/31/18 0200 155/90 98.3 °F (36.8 °C) 65 16 94 % 08/30/18 2138 151/83 98.7 °F (37.1 °C) 63 16 99 % 08/30/18 1513 137/69 98.3 °F (36.8 °C) 75 18 99 % 08/30/18 1148 - - - - 94 % 08/30/18 1145 - - - - (!) 86 % 08/30/18 1100 - - - - 92 % 08/30/18 0953 149/71 - - - - REVIEW OF SYSTEMS:   
Constitutional: negative fever, negative chills, negative weight loss Eyes:   negative visual changes ENT:   negative sore throat, tongue or lip swelling Cards:  negative for chest pain, palpitations, lower extremity edema GI:   negative for nausea, vomiting, diarrhea, and abdominal pain Neuro:  negative for headaches, dizziness, vertigo [x]                        Full ROS o/w normal/non contributor Constitutional:  Patient looks []        Sick [x]        Frail 
[]        Better                                                
[]        Depressed HEENT:  [x]   NC                         []   AT               []    ALOPECIA Eyes: [x]   Normal               []    Icteric Oropharynx: []    Normal                  []  Thrush               []   Dry Mucositis: [x]    None                 Grade: []        I  []        II []        III  []        IV Neck:   [x]   Supple                  []  Rigid JVD:    [x]   ABSENT       []   PRESENT Lymphadenopathy:   [x]   None Noted            []   PRESENT Chest: 
[x]   Clear               []    Rhonchi                      Dec'd @     [x]  Right Base           [x]   Left Base CV:             [x]   Regular              []  Irregular               []   Tachy                []   Murmur Abdominal:   [x]    Soft              []   NON-tender               []   Tender BS:    [x]   ABSENT                   []   PRESENT Liver:     [x]  NON-palp                  []   EDGE- palp Spleen: [x]   NON-palp                   []  EDGE - palp Mass:   []   ABSENT                          [x]  PRESENT Extr:    [x]  Lymphedema             []   Cyanosis      []  Clubbing Edema:     [x]   NONE       []   PRESENT Skin:  Intact [x]           Purpura [] Rash: [x]   ABSENT       []  PRESENT Neuro: 
[x]        Normal 
[]        Confused Available labs reviewed: 
Labs:   
Recent Results (from the past 24 hour(s)) GLUCOSE, POC Collection Time: 08/30/18 11:06 AM  
Result Value Ref Range Glucose (POC) 133 (H) 65 - 100 mg/dL Performed by restOpolis Collection Time: 08/30/18 11:20 AM  
Result Value Ref Range Ferritin 175 8 - 252 NG/ML  
IRON PROFILE Collection Time: 08/30/18 11:20 AM  
Result Value Ref Range Iron 47 35 - 150 ug/dL TIBC 186 (L) 250 - 450 ug/dL Iron % saturation 25 20 - 50 % GLUCOSE, POC Collection Time: 08/30/18  4:15 PM  
Result Value Ref Range Glucose (POC) 114 (H) 65 - 100 mg/dL Performed by Vi Cortez GLUCOSE, POC Collection Time: 08/30/18 10:12 PM  
Result Value Ref Range Glucose (POC) 136 (H) 65 - 100 mg/dL Performed by Cassandra Garza CBC WITH AUTOMATED DIFF Collection Time: 08/31/18  2:10 AM  
Result Value Ref Range WBC 2.1 (L) 3.6 - 11.0 K/uL  
 RBC 3.11 (L) 3.80 - 5.20 M/uL HGB 7.9 (L) 11.5 - 16.0 g/dL HCT 25.3 (L) 35.0 - 47.0 % MCV 81.4 80.0 - 99.0 FL  
 MCH 25.4 (L) 26.0 - 34.0 PG  
 MCHC 31.2 30.0 - 36.5 g/dL  
 RDW 17.5 (H) 11.5 - 14.5 % PLATELET 180 738 - 501 K/uL MPV 9.7 8.9 - 12.9 FL  
 NRBC 0.0 0  WBC ABSOLUTE NRBC 0.00 0.00 - 0.01 K/uL NEUTROPHILS 60 32 - 75 % LYMPHOCYTES 21 12 - 49 % MONOCYTES 14 (H) 5 - 13 % EOSINOPHILS 3 0 - 7 % BASOPHILS 1 0 - 1 % IMMATURE GRANULOCYTES 1 (H) 0.0 - 0.5 % ABS. NEUTROPHILS 1.3 (L) 1.8 - 8.0 K/UL  
 ABS. LYMPHOCYTES 0.4 (L) 0.8 - 3.5 K/UL  
 ABS. MONOCYTES 0.3 0.0 - 1.0 K/UL  
 ABS. EOSINOPHILS 0.1 0.0 - 0.4 K/UL  
 ABS. BASOPHILS 0.0 0.0 - 0.1 K/UL  
 ABS. IMM. GRANS. 0.0 0.00 - 0.04 K/UL  
 DF SMEAR SCANNED    
 RBC COMMENTS ANISOCYTOSIS 1+ 
    
 RBC COMMENTS HYPOCHROMIA 1+ 
    
 RBC COMMENTS OVALOCYTES PRESENT 
    
METABOLIC PANEL, BASIC Collection Time: 08/31/18  2:10 AM  
Result Value Ref Range Sodium 139 136 - 145 mmol/L Potassium 4.1 3.5 - 5.1 mmol/L Chloride 108 97 - 108 mmol/L  
 CO2 21 21 - 32 mmol/L Anion gap 10 5 - 15 mmol/L Glucose 101 (H) 65 - 100 mg/dL BUN 18 6 - 20 MG/DL Creatinine 0.67 0.55 - 1.02 MG/DL  
 BUN/Creatinine ratio 27 (H) 12 - 20 GFR est AA >60 >60 ml/min/1.73m2 GFR est non-AA >60 >60 ml/min/1.73m2 Calcium 7.7 (L) 8.5 - 10.1 MG/DL  
GLUCOSE, POC Collection Time: 08/31/18  6:40 AM  
Result Value Ref Range Glucose (POC) 94 65 - 100 mg/dL Performed by Charls Hart Available Xrays reviewed: 
 
Chemotherapy monitored and toxicities assessed: 
 
Assessment and Plan 1. Met. Lung cancer, s/p first dose of alimta/avastan on 8/24. .. Too early to assess for response, counts are stable today (8/29), 2. Dyspnea. . Multi-factorial. She states that she was \"fine\" on day of chemo but became sob the next day for no apparent cause. . CTA is negative for PNA/PE/effusion. .. CT done yesterday shows atelectasis in bases, she continues jet-nebs and incentive spirometry,,she is breathing more comfortably and appears ready for discharge 3. Headache. .ct brain/ MRI also  negative for mets/bleed . .. Probably secondary to htn or anti emetic. Keith Ny 4. Htn, norvasc increased  to 10mg/d 
5. Anemia. . Multifactorial,hgb is up to 7.9 today 6.   
Lin Ford MD

## 2018-08-31 NOTE — PROGRESS NOTES
Problem: Falls - Risk of 
Goal: *Absence of Falls Document Evin Gracia Fall Risk and appropriate interventions in the flowsheet. Outcome: Progressing Towards Goal 
Fall Risk Interventions: 
Mobility Interventions: Communicate number of staff needed for ambulation/transfer, Patient to call before getting OOB Medication Interventions: Patient to call before getting OOB, Evaluate medications/consider consulting pharmacy Elimination Interventions: Call light in reach, Patient to call for help with toileting needs History of Falls Interventions: Door open when patient unattended

## 2018-08-31 NOTE — PROGRESS NOTES
I have reviewed discharge instructions with the patient and spouse. The patient and spouse verbalized understanding. Amando Herring

## 2018-08-31 NOTE — PROGRESS NOTES
Bedside shift change report given to Amando RN (oncoming nurse) by Ky Guardado RN (offgoing nurse). Report included the following information SBAR, Kardex and Recent Results.

## 2018-08-31 NOTE — DISCHARGE SUMMARY
Discharge Summary       PATIENT ID: Gold Teague  MRN: [id number]   YOB: 1955    DATE OF ADMISSION: 8/25/2018  6:38 PM    DATE OF DISCHARGE: 8/31/2018   PRIMARY CARE PROVIDER: Jaylin De La Cruz MD       DISCHARGING PHYSICIAN: Uzma Ochoa MD    To contact this individual call [phone] and ask the  to page. If unavailable ask to be transferred the Adult Hospitalist Department. CONSULTATIONS: IP CONSULT TO HEMATOLOGY  IP CONSULT TO PULMONOLOGY    PROCEDURES/SURGERIES: * No surgery found *    ADMITTING [address] [place] [address] COURSE:     The patient is a 69-year-old [facility] [employer] female who has been diagnosed with metastatic lung cancer in 10/2017.  She started Avastin last Friday.  According to the daughter and son, she had been short of breath for a while; however, today, her shortness of breath became worse, so she called her oncologist who recommended that the patient should go to ER to rule out pulmonary embolism.  In ER, a CTA was done, which showed progression of disease with multiple pulmonary nodules. Patient was admitted for acute hypoxic respiratory failure. DISCHARGE DIAGNOSES / PLAN:      SOB/Acute hypoxic respiratory failure  -Multifactorial - Restrictive lung disease/?Lupus related vs related to Avastin - qualified for home oxygen at 1 liter nc continuosly  -Wean off oxygen to keep sat>92%  -Incentive spirometry  -Appreciate Pulmonary follow up - no role for steroids  - Stable     Metastatic lung cancer  -CT chest 8/25 Progression of disease with multiple pulmonary nodules. -diagnosed in 2017 s/p chemotherapy s/p radiation therapy  -Recently (yesterday) started on chemotherapy (infusion)  -Finished  LVQ  -CT head 8/29 as she is reporting headaches - negative  -MRI showed no evidence of brain metastasis.  Chronic multifocal nonspecific white matter T2 hyperintensity.  - Continue Tylenol as needed for headaches.     Anemia  -sec to ?chemotherapy  -Started on Procrit  -Iron panel reviewed. Not iron deficiency. -Transfuse if hg<7,   F/u with PCP     HTN  - amlodipine      Lupus  -continue home meds     EKG changes  -TWI in inferior old finding  -Echo with EF 55-6-% with no RWMA     Regular diet     Code status: FULL CODE  DVT prophylaxis: scd     Care Plan discussed with: Patient/Family  Disposition prior to discharge: Patient is hemodynamically stable and has improved. D/c to home with self and family care        PENDING TEST RESULTS:   At the time of discharge the following test results are still pending:     FOLLOW UP APPOINTMENTS:    Follow-up Information     Follow up With Details Comments 3 [address] [place] [address] MD Nanda   [address] [facility]  [phone]             ADDITIONAL CARE RECOMMENDATIONS:     DIET: Cardiac Diet    ACTIVITY: Activity as tolerated    WOUND CARE: none    EQUIPMENT needed:       DISCHARGE MEDICATIONS:  Current Discharge Medication List      START taking these medications    Details   acetaminophen (TYLENOL) 325 mg tablet Take 2 Tabs by mouth every six (6) hours as needed. Indications: Headache Disorder, Pain  Qty: 30 Tab, Refills: 0         CONTINUE these medications which have NOT CHANGED    Details   oxyCODONE ER (OXYCONTIN) 20 mg ER tablet Take 20 mg by mouth every twelve (12) hours. Indications: Chronic Pain      HYDROmorphone (DILAUDID) 2 mg tablet Take 2 mg by mouth every four (4) hours as needed for Pain.      minocycline (DYNACIN) 100 mg tablet Take 100 mg by mouth two (2) times a day. acyclovir (ZOVIRAX) 400 mg tablet Take 400 mg by mouth two (2) times a day. folic acid (FOLVITE) 1 mg tablet Take 1 mg by mouth daily. amLODIPine (NORVASC) 2.5 mg tablet Take 5 mg by mouth daily. aspirin delayed-release 81 mg tablet Take 81 mg by mouth daily. calcium citrate-vitamin D3 (CITRACAL + D) tablet Take 1 Tab by mouth two (2) times a day.       multivitamin (ONE A DAY) tablet Take 1 Tab by mouth daily. HYDROcodone-acetaminophen (NORCO)  mg tablet Take 2 Tabs by mouth every six (6) hours as needed for Pain.      hydroxychloroquine (PLAQUENIL) 200 mg tablet Take 200 mg by mouth daily. methotrexate (RHEUMATREX) 2.5 mg tablet Take 2.5 mg by mouth. omeprazole (PRILOSEC) 20 mg capsule Take 20 mg by mouth daily. amitriptyline (ELAVIL) 10 mg tablet Take  by mouth nightly. estradiol (ESTRACE) 0.01 % (0.1 mg/gram) vaginal cream Insert 2 g into vagina daily. triamcinolone (KENALOG) 0.1 % lotion Apply  to affected area two (2) times a day. use thin layer               NOTIFY YOUR PHYSICIAN FOR ANY OF THE FOLLOWING:   Fever over 101 degrees for 24 hours. Chest pain, shortness of breath, fever, chills, nausea, vomiting, diarrhea, change in mentation, falling, weakness, bleeding. Severe pain or pain not relieved by medications. Or, any other signs or symptoms that you may have questions about. DISPOSITION:   x Home With:   OT  PT  HH  RN       Long term SNF/Inpatient Rehab    Independent/assisted living    Hospice    Other:       PATIENT CONDITION AT DISCHARGE:     Functional status    Poor    x Deconditioned     Independent      Cognition   x  Lucid     Forgetful     Dementia      Catheters/lines (plus indication)    Edgar     PICC     PEG    x None      Code status   x  Full code     DNR      PHYSICAL EXAMINATION AT DISCHARGE:   Refer to Progress Note    Constitutional:  No acute distress, cooperative, pleasant    ENT:  Oral mucous moist, oropharynx benign. Neck supple,    Resp:  Decreased at bases. CTA bilaterally. No wheezing/rhonchi/rales. No accessory muscle use   CV:  Regular rhythm, normal rate, no murmurs, gallops, rubs    GI:  Soft, non distended, non tender. normoactive bowel sounds, no hepatosplenomegaly     Musculoskeletal:  No edema, warm, 2+ pulses throughout    Neurologic:  Moves all extremities.   AAOx3, CN II-XII reviewed Skin:  Good turgor, no rashes or ulcers      CHRONIC MEDICAL DIAGNOSES:  Problem List as of 8/31/2018  Date Reviewed: 8/25/2018          Codes Class Noted - Resolved    * (Principal)Shortness of breath ICD-10-CM: R06.02  ICD-9-CM: 786.05  8/25/2018 - Present              Greater than 30 minutes were spent with the patient on counseling and coordination of care    Signed:   Ingrid Alvarado MD  8/31/2018  12:33 PM

## 2018-08-31 NOTE — PROGRESS NOTES
UPDATE 2:52PM 
[address] approved patient for Home O2-1 liter. Ulices Bean en route to deliver DME. No further needs identified. CM will be available in case needs arise. TERRY Chavarria, CRM Reviewed medical chart and received orders. Met with patient and her daughter at bedside to discuss transitions of care. Patient expected to discharge home with Home O2. Patient have never used home O2 and does not have preference. Sent referral to [address] via [employer]; awaiting response. CM will continue to follow.   
TERRY Chavarria, GATO

## 2018-09-26 ENCOUNTER — TELEPHONE (OUTPATIENT)
Dept: WOUND CARE | Age: 63
End: 2018-09-26

## 2018-09-26 ENCOUNTER — HOSPITAL ENCOUNTER (OUTPATIENT)
Dept: WOUND CARE | Age: 63
Discharge: HOME | End: 2018-09-26
Payer: COMMERCIAL

## 2018-09-26 VITALS
DIASTOLIC BLOOD PRESSURE: 78 MMHG | RESPIRATION RATE: 16 BRPM | SYSTOLIC BLOOD PRESSURE: 119 MMHG | TEMPERATURE: 98.5 F | HEART RATE: 82 BPM

## 2018-09-26 PROBLEM — L98.419: Status: ACTIVE | Noted: 2018-09-26

## 2018-09-26 PROBLEM — C34.90 LUNG CANCER (HCC): Status: ACTIVE | Noted: 2018-09-26

## 2018-09-26 PROCEDURE — 99214 OFFICE O/P EST MOD 30 MIN: CPT

## 2018-09-26 PROCEDURE — 97597 DBRDMT OPN WND 1ST 20 CM/<: CPT

## 2018-09-26 NOTE — WOUND CARE
09/26/18 1994 Wound Buttocks Left Date First Assessed/Time First Assessed: 09/26/18 0848   POA: Yes  Location: Buttocks  Wound Description (Optional): L buttock, cluster of 3  Orientation: Left DRESSING TYPE Open to air; Other (Comment) (Mupuricin ointment) Non-Pressure Injury Full thickness (subcut/muscle) Wound Length (cm) 3 cm Wound Width (cm) 1.5 cm Wound Depth (cm) 0.1 Wound Surface area (cm^2) 4.5 cm^2 Condition of Base Pink;Slough Tissue Type Pink (Clustered wound (x 3)) Drainage Amount  Scant Drainage Color Serous Wound Odor None Periwound Skin Condition Intact Cleansing and Cleansing Agents  Normal saline Wound Buttocks Right Date First Assessed/Time First Assessed: 09/26/18 0850   POA: Yes  Location: Buttocks  Wound Description (Optional): R buttock, cluster of 6   Orientation: Right DRESSING TYPE Open to air; Other (Comment) (Mupuricin) Non-Pressure Injury Full thickness (subcut/muscle) Wound Length (cm) 4 cm Wound Width (cm) 1.5 cm Wound Depth (cm) 0.1 Wound Surface area (cm^2) 6 cm^2 Condition of Base Pink;Slough (Cluster of 6 wounds) Condition of Edges Open Tissue Type Pink Drainage Amount  Scant Drainage Color Serous Wound Odor None Periwound Skin Condition Intact Cleansing and Cleansing Agents  Normal saline

## 2018-09-26 NOTE — H&P
Wound CenterHistory and Physical 
 
Date of Service: 2018 Subjective: Chief Complaint: 
Calin Ellis is a 58 y.o.  female who presents with bilateral buttock wounds of about 2 month's duration. Referred by:  Dr. Kenyetta Garcia HPI:    
Wound caused by: combination - has lung cancer, inactivity/pressure, possible herpes zoster lesions. Current wound care: 
Offloading wound: yes Appetite: fair Wound associated pain: mild Diabetic: No 
Smoker: No 
 
Past Medical History:  
Diagnosis Date  Arthritis  Autoimmune disease ([address] lupus  Cancer ([address] lung cancer  Gastrointestinal disorder GERD  Hypertension Past Surgical History:  
Procedure Laterality Date  HX CARPAL TUNNEL RELEASE  HX  SECTION    
 HX HYSTERECTOMY History reviewed. No pertinent family history. Social History Substance Use Topics  Smoking status: Never Smoker  Smokeless tobacco: Never Used  Alcohol use No  
   
Prior to Admission medications Medication Sig Start Date End Date Taking? Authorizing Provider  
bevacizumab (AVASTIN IV) by IntraVENous route. Yes Historical Provider  
pemetrexed disodium (ALIMTA IV) by IntraVENous route. Once every 3 weeks. Yes Historical Provider  
acetaminophen (TYLENOL) 325 mg tablet Take 2 Tabs by mouth every six (6) hours as needed. Indications: Headache Disorder, Pain 18   Lou Martel MD  
amLODIPine (NORVASC) 10 mg tablet Take 1 Tab by mouth daily. Indications: hypertension 18   Lou Martel MD  
mirtazapine (REMERON SOL-TAB) 15 mg disintegrating tablet Take 1 Tab by mouth nightly. 18   Lou Martel MD  
oxyCODONE ER (OXYCONTIN) 20 mg ER tablet Take 20 mg by mouth every twelve (12) hours. Indications: Chronic Pain 18   Historical Provider HYDROmorphone (DILAUDID) 2 mg tablet Take 2 mg by mouth every four (4) hours as needed for Pain. Historical Provider acyclovir (ZOVIRAX) 400 mg tablet Take 400 mg by mouth two (2) times a day. Historical Provider  
folic acid (FOLVITE) 1 mg tablet Take 1 mg by mouth daily. Historical Provider  
omeprazole (PRILOSEC) 20 mg capsule Take 20 mg by mouth daily. Historical Provider  
triamcinolone (KENALOG) 0.1 % lotion Apply  to affected area two (2) times a day. use thin layer    Historical Provider  
aspirin delayed-release 81 mg tablet Take 81 mg by mouth daily. Historical Provider  
calcium citrate-vitamin D3 (CITRACAL + D) tablet Take 1 Tab by mouth two (2) times a day. Historical Provider  
multivitamin (ONE A DAY) tablet Take 1 Tab by mouth daily. Historical Provider No Known Allergies Review of Systems: A comprehensive review of systems was negative except for that written in the History of Present Illness. and PMH. Objective:  
 
Physical Exam:  
 
Visit Vitals  /78 (BP 1 Location: Left arm)  Pulse 82  Temp 98.5 °F (36.9 °C)  Resp 16  Breastfeeding No  
 
General: well developed, well nourished, pleasant , NAD. Hygiene good Psych: cooperative. Pleasant. No anxiety or depression. Normal mood and affect. Neuro: alert and oriented to person/place/situation. Otherwise nonfocal. 
HEENT: Normocephalic, atraumatic. EOMI. Conjunctiva clear. No scleral icterus. Chest: Respirations nonlabored. Abdomen:  Nondistended. Ulcer Location: Buttocks bilateral small wounds, measured in groups. Etiology: combined Left measurement first, then right buttock. Wound Length: 3 cm, 4 cm Wound Width :1.5 cm, 1.5 cm Wound Depth :0.1, 0.1 Ulcer bed: Fibrotic slough Periwound: Normal 
Exudate: Scant/small amount Serous exudate Depth of Wound: To Fat Layer Assessment:  
 
58 y.o. female with bilateral buttock sores. Plan:  
Ulcer Debridement Ulcer Number 2; BilateralButtock To Fat Layer Character of Ulcer: New Indication for Debridement: Slough, Exudate Pre debridement measurements:  
Wound Length: 3 cm, 4 cm Wound Width :1.5 cm, 1.5 cm Wound Depth :0.1, 0.1 Risks of procedure were discussed with patient. Consent has been signed. Anesthetic: Lidocaine 2% topical gel Level of Debridement: Fibrin/ Exudate/ Debris/ Biofilm Tissue and/or Material removed: Exudate Type of Tissue: Non- Viable Pre-debridement severity: Fat Layer Exposed Post debridement severity: Fat Layer exposed Instrument(s) used: Curette Bleeding controlled with: Pressure Estimated blood loss: Minimal 
 
Post debridement measurements:  
Wound Length: 3 cm, 4 cm Wound Width :1.5 cm, 1.5 cm Wound Depth :0.1, 0.1 Post procedural pain: none Patient tolerated procedure well. Dressing: Aquacell Ag, border gauze. Frequency: daily Patient understood and agrees with plan. Questions answered. Weekly visits and serial debridements also discussed. Follow up with me in 1 week Signed By: Otto Blount MD   
 September 26, 2018

## 2018-10-03 ENCOUNTER — HOSPITAL ENCOUNTER (OUTPATIENT)
Dept: WOUND CARE | Age: 63
Discharge: HOME | End: 2018-10-03
Payer: COMMERCIAL

## 2018-10-03 VITALS
DIASTOLIC BLOOD PRESSURE: 78 MMHG | TEMPERATURE: 98.2 F | RESPIRATION RATE: 16 BRPM | HEART RATE: 86 BPM | SYSTOLIC BLOOD PRESSURE: 122 MMHG

## 2018-10-03 PROCEDURE — 99214 OFFICE O/P EST MOD 30 MIN: CPT

## 2018-10-03 NOTE — WOUND CARE
10/03/18 1138 Wound Buttocks Left Date First Assessed/Time First Assessed: 09/26/18 0848   POA: Yes  Location: Buttocks  Wound Description (Optional): L buttock, cluster of 3  Orientation: Left DRESSING TYPE Open to air Wound Length (cm) 2.5 cm Wound Width (cm) 0.6 cm Wound Depth (cm) 0.1 Wound Surface area (cm^2) 1.5 cm^2 Change in Wound Size % 66.67 Condition of Base Pink Tissue Type Pink Drainage Amount  None Wound Odor None Periwound Skin Condition Intact Cleansing and Cleansing Agents  Normal saline Wound Buttocks Right Date First Assessed/Time First Assessed: 09/26/18 0850   POA: Yes  Location: Buttocks  Wound Description (Optional): R buttock, cluster of 6   Orientation: Right DRESSING TYPE Open to air Wound Length (cm) 0.1 cm Wound Width (cm) 0.1 cm Wound Depth (cm) 0.1 Wound Surface area (cm^2) 0.01 cm^2 Change in Wound Size % 99.83 Condition of Base (scabbed) Drainage Amount  None Wound Odor None Cleansing and Cleansing Agents  Normal saline

## 2018-10-03 NOTE — PROGRESS NOTES
Wound Center Progress Note 
  
Date of Service: 10/3/2018  
  
Subjective:  
  
Chief Complaint: 
Rambo Player is a 58 y.o.  female who presents with bilateral buttock wounds of about 2 month's duration. 
  
Referred by:  Dr. Yovanny Gordon 
  
HPI:    
Wound caused by: combination - has lung cancer, inactivity/pressure, possible herpes zoster lesions. Current wound care: 
Offloading wound: yes Appetite: fair Wound associated pain: mild Diabetic: No 
Smoker: No 
  
    
Past Medical History:  
Diagnosis Date  Arthritis    
 Autoimmune disease (HCC)    
  lupus  Cancer [facility])    
  lung cancer  Gastrointestinal disorder    
  GERD  Hypertension    
  
Physical Exam:  
  
    
Visit Vitals  /78 (BP 1 Location: Left arm)  Pulse 82  Temp 98.5 °F (36.9 °C)  Resp 16  Breastfeeding No  
  
General: well developed, well nourished, pleasant , NAD. Hygiene good Psych: cooperative. Pleasant. No anxiety or depression. Normal mood and affect. Neuro: alert and oriented to person/place/situation. Otherwise nonfocal. 
HEENT: Normocephalic, atraumatic. EOMI. Conjunctiva clear. No scleral icterus. Chest: Respirations nonlabored. Abdomen:  Nondistended. 
  
  
Ulcer Location: Buttocks bilateral small wounds, measured in groups. Etiology: combined Left measurement first, right buttock healed. Wound Length: 2.5 cm, Wound Width :0.6 cm, Wound Depth : 0.1 cm Ulcer bed: Fibrotic slough Periwound: Normal 
Exudate: Scant/small amount Serous exudate Depth of Wound: To Fat Layer  
  
  
Assessment:  
  
58 y.o. female with bilateral buttock sores. 
  
  
Plan:  
Dressing: Aquacell Ag, border gauze. Frequency: daily 
  
Patient understood and agrees with plan. Questions answered. 
  
Weekly visits and serial debridements also discussed.  
Follow up with me in 2 weeks. 
  
Signed By: Rowan Renner MD

## 2018-10-03 NOTE — WOUND CARE
10/03/18 1342 Wound Buttocks Left Date First Assessed/Time First Assessed: 09/26/18 0848   POA: Yes  Location: Buttocks  Wound Description (Optional): L buttock, cluster of 3  Orientation: Left Dressing Type Applied (Aquacell AG and border foam) Wound Buttocks Right Date First Assessed/Time First Assessed: 09/26/18 0850   POA: Yes  Location: Buttocks  Wound Description (Optional): R buttock, cluster of 6   Orientation: Right Dressing Type Applied (Aquacell AG and border foam) Wound Location: R &L Buttocks Wound Dressing Placed: per MD as above Tolerated: well Pain: denies Discharged to: Home Discharged with: daughter Assistive device: none Follow up in 2 weeks.

## 2018-10-04 ENCOUNTER — HOSPITAL ENCOUNTER (INPATIENT)
Age: 63
LOS: 7 days | Discharge: HOME | DRG: 181 | End: 2018-10-11
Attending: EMERGENCY MEDICINE | Admitting: HOSPITALIST
Payer: COMMERCIAL

## 2018-10-04 ENCOUNTER — APPOINTMENT (OUTPATIENT)
Dept: CT IMAGING | Age: 63
DRG: 181 | End: 2018-10-04
Attending: PHYSICIAN ASSISTANT
Payer: COMMERCIAL

## 2018-10-04 DIAGNOSIS — R06.09 EXERTIONAL DYSPNEA: ICD-10-CM

## 2018-10-04 DIAGNOSIS — J90 PLEURAL EFFUSION: ICD-10-CM

## 2018-10-04 DIAGNOSIS — R09.02 HYPOXIA: Primary | ICD-10-CM

## 2018-10-04 DIAGNOSIS — G89.3 CANCER ASSOCIATED PAIN: ICD-10-CM

## 2018-10-04 DIAGNOSIS — R53.0 NEOPLASTIC MALIGNANT RELATED FATIGUE: ICD-10-CM

## 2018-10-04 DIAGNOSIS — R41.82 ALTERED MENTAL STATUS, UNSPECIFIED ALTERED MENTAL STATUS TYPE: ICD-10-CM

## 2018-10-04 DIAGNOSIS — M25.511 CHRONIC RIGHT SHOULDER PAIN: ICD-10-CM

## 2018-10-04 DIAGNOSIS — G89.29 CHRONIC RIGHT SHOULDER PAIN: ICD-10-CM

## 2018-10-04 DIAGNOSIS — R06.02 SHORTNESS OF BREATH: ICD-10-CM

## 2018-10-04 DIAGNOSIS — I31.39 PERICARDIAL EFFUSION: ICD-10-CM

## 2018-10-04 PROBLEM — J96.20 ACUTE ON CHRONIC RESPIRATORY FAILURE (HCC): Status: ACTIVE | Noted: 2018-10-04

## 2018-10-04 LAB
ANION GAP SERPL CALC-SCNC: 9 MMOL/L (ref 5–15)
APPEARANCE UR: CLEAR
ATRIAL RATE: 79 BPM
BACTERIA URNS QL MICRO: NEGATIVE /HPF
BASOPHILS # BLD: 0 K/UL (ref 0–0.1)
BASOPHILS NFR BLD: 0 % (ref 0–1)
BILIRUB UR QL: NEGATIVE
BNP SERPL-MCNC: 832 PG/ML (ref 0–125)
BUN SERPL-MCNC: 16 MG/DL (ref 6–20)
BUN/CREAT SERPL: 14 (ref 12–20)
CALCIUM SERPL-MCNC: 8 MG/DL (ref 8.5–10.1)
CALCULATED P AXIS, ECG09: 29 DEGREES
CALCULATED R AXIS, ECG10: 8 DEGREES
CALCULATED T AXIS, ECG11: 2 DEGREES
CHLORIDE SERPL-SCNC: 103 MMOL/L (ref 97–108)
CK SERPL-CCNC: 70 U/L (ref 26–192)
CO2 SERPL-SCNC: 23 MMOL/L (ref 21–32)
COLOR UR: NORMAL
COMMENT, HOLDF: NORMAL
CREAT SERPL-MCNC: 1.11 MG/DL (ref 0.55–1.02)
DIAGNOSIS, 93000: NORMAL
DIFFERENTIAL METHOD BLD: ABNORMAL
EOSINOPHIL # BLD: 0.1 K/UL (ref 0–0.4)
EOSINOPHIL NFR BLD: 1 % (ref 0–7)
EPITH CASTS URNS QL MICRO: NORMAL /LPF
ERYTHROCYTE [DISTWIDTH] IN BLOOD BY AUTOMATED COUNT: 24.9 % (ref 11.5–14.5)
GLUCOSE SERPL-MCNC: 164 MG/DL (ref 65–100)
GLUCOSE UR STRIP.AUTO-MCNC: NEGATIVE MG/DL
HCT VFR BLD AUTO: 28.6 % (ref 35–47)
HGB BLD-MCNC: 8.8 G/DL (ref 11.5–16)
HGB UR QL STRIP: NEGATIVE
HYALINE CASTS URNS QL MICRO: NORMAL /LPF (ref 0–5)
IMM GRANULOCYTES # BLD: 0.1 K/UL
IMM GRANULOCYTES NFR BLD AUTO: 1 %
KETONES UR QL STRIP.AUTO: NEGATIVE MG/DL
LEUKOCYTE ESTERASE UR QL STRIP.AUTO: NEGATIVE
LYMPHOCYTES # BLD: 0.4 K/UL (ref 0.8–3.5)
LYMPHOCYTES NFR BLD: 5 % (ref 12–49)
MAGNESIUM SERPL-MCNC: 2.2 MG/DL (ref 1.6–2.4)
MCH RBC QN AUTO: 26.5 PG (ref 26–34)
MCHC RBC AUTO-ENTMCNC: 30.8 G/DL (ref 30–36.5)
MCV RBC AUTO: 86.1 FL (ref 80–99)
MONOCYTES # BLD: 1.3 K/UL (ref 0–1)
MONOCYTES NFR BLD: 15 % (ref 5–13)
NEUTS SEG # BLD: 6.7 K/UL (ref 1.8–8)
NEUTS SEG NFR BLD: 78 % (ref 32–75)
NITRITE UR QL STRIP.AUTO: NEGATIVE
NRBC # BLD: 0 K/UL (ref 0–0.01)
NRBC BLD-RTO: 0 PER 100 WBC
P-R INTERVAL, ECG05: 128 MS
PH UR STRIP: 6.5 [PH] (ref 5–8)
PLATELET # BLD AUTO: 586 K/UL (ref 150–400)
PLATELET COMMENTS,PCOM: ABNORMAL
PMV BLD AUTO: 9.5 FL (ref 8.9–12.9)
POTASSIUM SERPL-SCNC: 3.9 MMOL/L (ref 3.5–5.1)
PROT UR STRIP-MCNC: NEGATIVE MG/DL
Q-T INTERVAL, ECG07: 398 MS
QRS DURATION, ECG06: 70 MS
QTC CALCULATION (BEZET), ECG08: 456 MS
RBC # BLD AUTO: 3.32 M/UL (ref 3.8–5.2)
RBC #/AREA URNS HPF: NORMAL /HPF (ref 0–5)
RBC MORPH BLD: ABNORMAL
SAMPLES BEING HELD,HOLD: NORMAL
SODIUM SERPL-SCNC: 135 MMOL/L (ref 136–145)
SP GR UR REFRACTOMETRY: 1.02 (ref 1–1.03)
TROPONIN I SERPL-MCNC: <0.05 NG/ML
UROBILINOGEN UR QL STRIP.AUTO: 0.2 EU/DL (ref 0.2–1)
VENTRICULAR RATE, ECG03: 79 BPM
WBC # BLD AUTO: 8.6 K/UL (ref 3.6–11)
WBC URNS QL MICRO: NORMAL /HPF (ref 0–4)

## 2018-10-04 PROCEDURE — 74011250636 HC RX REV CODE- 250/636: Performed by: HOSPITALIST

## 2018-10-04 PROCEDURE — 74011000258 HC RX REV CODE- 258: Performed by: EMERGENCY MEDICINE

## 2018-10-04 PROCEDURE — 36415 COLL VENOUS BLD VENIPUNCTURE: CPT | Performed by: PHYSICIAN ASSISTANT

## 2018-10-04 PROCEDURE — 80048 BASIC METABOLIC PNL TOTAL CA: CPT | Performed by: PHYSICIAN ASSISTANT

## 2018-10-04 PROCEDURE — 71275 CT ANGIOGRAPHY CHEST: CPT

## 2018-10-04 PROCEDURE — 83880 ASSAY OF NATRIURETIC PEPTIDE: CPT | Performed by: PHYSICIAN ASSISTANT

## 2018-10-04 PROCEDURE — 74011250637 HC RX REV CODE- 250/637: Performed by: HOSPITALIST

## 2018-10-04 PROCEDURE — 82550 ASSAY OF CK (CPK): CPT | Performed by: PHYSICIAN ASSISTANT

## 2018-10-04 PROCEDURE — 84484 ASSAY OF TROPONIN QUANT: CPT | Performed by: PHYSICIAN ASSISTANT

## 2018-10-04 PROCEDURE — 83735 ASSAY OF MAGNESIUM: CPT | Performed by: PHYSICIAN ASSISTANT

## 2018-10-04 PROCEDURE — 74011636320 HC RX REV CODE- 636/320: Performed by: EMERGENCY MEDICINE

## 2018-10-04 PROCEDURE — 93005 ELECTROCARDIOGRAM TRACING: CPT

## 2018-10-04 PROCEDURE — 99285 EMERGENCY DEPT VISIT HI MDM: CPT

## 2018-10-04 PROCEDURE — 85025 COMPLETE CBC W/AUTO DIFF WBC: CPT | Performed by: PHYSICIAN ASSISTANT

## 2018-10-04 PROCEDURE — 65270000029 HC RM PRIVATE

## 2018-10-04 PROCEDURE — 81001 URINALYSIS AUTO W/SCOPE: CPT | Performed by: PHYSICIAN ASSISTANT

## 2018-10-04 RX ORDER — DEXAMETHASONE 4 MG/1
4 TABLET ORAL EVERY 12 HOURS
Status: DISCONTINUED | OUTPATIENT
Start: 2018-10-04 | End: 2018-10-05

## 2018-10-04 RX ORDER — ACYCLOVIR 800 MG/1
400 TABLET ORAL 2 TIMES DAILY
Status: DISCONTINUED | OUTPATIENT
Start: 2018-10-04 | End: 2018-10-11 | Stop reason: HOSPADM

## 2018-10-04 RX ORDER — SODIUM CHLORIDE 0.9 % (FLUSH) 0.9 %
5-10 SYRINGE (ML) INJECTION EVERY 8 HOURS
Status: DISCONTINUED | OUTPATIENT
Start: 2018-10-04 | End: 2018-10-11 | Stop reason: HOSPADM

## 2018-10-04 RX ORDER — DEXAMETHASONE 4 MG/1
4 TABLET ORAL EVERY 12 HOURS
Status: ON HOLD | COMMUNITY
End: 2018-10-11

## 2018-10-04 RX ORDER — ONDANSETRON HYDROCHLORIDE 8 MG/1
8 TABLET, FILM COATED ORAL
Status: ON HOLD | COMMUNITY
End: 2018-10-11

## 2018-10-04 RX ORDER — PROCHLORPERAZINE MALEATE 10 MG
5 TABLET ORAL
COMMUNITY

## 2018-10-04 RX ORDER — ASPIRIN 81 MG/1
81 TABLET ORAL DAILY
Status: DISCONTINUED | OUTPATIENT
Start: 2018-10-05 | End: 2018-10-11 | Stop reason: HOSPADM

## 2018-10-04 RX ORDER — ACETAMINOPHEN 325 MG/1
650 TABLET ORAL
Status: DISCONTINUED | OUTPATIENT
Start: 2018-10-04 | End: 2018-10-04 | Stop reason: SDUPTHER

## 2018-10-04 RX ORDER — FERROUS SULFATE, DRIED 160(50) MG
1 TABLET, EXTENDED RELEASE ORAL 2 TIMES DAILY
Status: DISCONTINUED | OUTPATIENT
Start: 2018-10-04 | End: 2018-10-11 | Stop reason: HOSPADM

## 2018-10-04 RX ORDER — OXYCODONE HCL 20 MG/1
20 TABLET, FILM COATED, EXTENDED RELEASE ORAL EVERY 12 HOURS
Status: DISCONTINUED | OUTPATIENT
Start: 2018-10-04 | End: 2018-10-11 | Stop reason: HOSPADM

## 2018-10-04 RX ORDER — LORAZEPAM 1 MG/1
2 TABLET ORAL
Status: DISCONTINUED | OUTPATIENT
Start: 2018-10-04 | End: 2018-10-11 | Stop reason: HOSPADM

## 2018-10-04 RX ORDER — LORAZEPAM 2 MG/1
2 TABLET ORAL
COMMUNITY

## 2018-10-04 RX ORDER — SODIUM CHLORIDE 0.9 % (FLUSH) 0.9 %
10 SYRINGE (ML) INJECTION
Status: COMPLETED | OUTPATIENT
Start: 2018-10-04 | End: 2018-10-04

## 2018-10-04 RX ORDER — ENOXAPARIN SODIUM 100 MG/ML
40 INJECTION SUBCUTANEOUS EVERY 24 HOURS
Status: DISCONTINUED | OUTPATIENT
Start: 2018-10-04 | End: 2018-10-11 | Stop reason: HOSPADM

## 2018-10-04 RX ORDER — HYDROMORPHONE HYDROCHLORIDE 2 MG/1
2 TABLET ORAL
Status: DISCONTINUED | OUTPATIENT
Start: 2018-10-04 | End: 2018-10-11 | Stop reason: HOSPADM

## 2018-10-04 RX ORDER — ACETAMINOPHEN 325 MG/1
650 TABLET ORAL
Status: DISCONTINUED | OUTPATIENT
Start: 2018-10-04 | End: 2018-10-11 | Stop reason: HOSPADM

## 2018-10-04 RX ORDER — PROCHLORPERAZINE MALEATE 5 MG
5 TABLET ORAL
Status: DISCONTINUED | OUTPATIENT
Start: 2018-10-04 | End: 2018-10-05 | Stop reason: SDUPTHER

## 2018-10-04 RX ORDER — FUROSEMIDE 10 MG/ML
40 INJECTION INTRAMUSCULAR; INTRAVENOUS DAILY
Status: DISCONTINUED | OUTPATIENT
Start: 2018-10-05 | End: 2018-10-11 | Stop reason: HOSPADM

## 2018-10-04 RX ORDER — FUROSEMIDE 20 MG/1
20 TABLET ORAL
COMMUNITY

## 2018-10-04 RX ORDER — SODIUM CHLORIDE 0.9 % (FLUSH) 0.9 %
5-10 SYRINGE (ML) INJECTION AS NEEDED
Status: DISCONTINUED | OUTPATIENT
Start: 2018-10-04 | End: 2018-10-11 | Stop reason: HOSPADM

## 2018-10-04 RX ORDER — MIRTAZAPINE 15 MG/1
15 TABLET, ORALLY DISINTEGRATING ORAL
Status: DISCONTINUED | OUTPATIENT
Start: 2018-10-04 | End: 2018-10-11 | Stop reason: HOSPADM

## 2018-10-04 RX ORDER — FOLIC ACID 1 MG/1
1 TABLET ORAL DAILY
Status: DISCONTINUED | OUTPATIENT
Start: 2018-10-05 | End: 2018-10-11 | Stop reason: HOSPADM

## 2018-10-04 RX ORDER — ONDANSETRON 2 MG/ML
4 INJECTION INTRAMUSCULAR; INTRAVENOUS
Status: DISCONTINUED | OUTPATIENT
Start: 2018-10-04 | End: 2018-10-11 | Stop reason: HOSPADM

## 2018-10-04 RX ORDER — AMLODIPINE BESYLATE 5 MG/1
10 TABLET ORAL DAILY
Status: DISCONTINUED | OUTPATIENT
Start: 2018-10-05 | End: 2018-10-11 | Stop reason: HOSPADM

## 2018-10-04 RX ORDER — NALOXONE HYDROCHLORIDE 0.4 MG/ML
0.4 INJECTION, SOLUTION INTRAMUSCULAR; INTRAVENOUS; SUBCUTANEOUS AS NEEDED
Status: DISCONTINUED | OUTPATIENT
Start: 2018-10-04 | End: 2018-10-11 | Stop reason: HOSPADM

## 2018-10-04 RX ADMIN — SODIUM CHLORIDE 100 ML: 900 INJECTION, SOLUTION INTRAVENOUS at 12:31

## 2018-10-04 RX ADMIN — OXYCODONE HYDROCHLORIDE 20 MG: 20 TABLET, FILM COATED, EXTENDED RELEASE ORAL at 20:41

## 2018-10-04 RX ADMIN — Medication 10 ML: at 12:31

## 2018-10-04 RX ADMIN — DEXAMETHASONE 4 MG: 4 TABLET ORAL at 20:41

## 2018-10-04 RX ADMIN — ENOXAPARIN SODIUM 40 MG: 100 INJECTION SUBCUTANEOUS at 19:17

## 2018-10-04 RX ADMIN — ACYCLOVIR 400 MG: 800 TABLET ORAL at 19:16

## 2018-10-04 RX ADMIN — IOPAMIDOL 100 ML: 755 INJECTION, SOLUTION INTRAVENOUS at 12:31

## 2018-10-04 RX ADMIN — Medication 10 ML: at 19:18

## 2018-10-04 NOTE — ROUTINE PROCESS
TRANSFER - OUT REPORT: 
 
Verbal report given to RN (name) on Vincent Enriquez  being transferred to [facility] (unit) for routine progression of care Report consisted of patients Situation, Background, Assessment and  
Recommendations(SBAR). Information from the following report(s) SBAR and ED Summary was reviewed with the receiving nurse. Lines:  
Peripheral IV 10/04/18 Left Antecubital (Active) Site Assessment Clean, dry, & intact 10/4/2018 11:12 AM  
Phlebitis Assessment 0 10/4/2018 11:12 AM  
Infiltration Assessment 0 10/4/2018 11:12 AM  
Dressing Status Clean, dry, & intact 10/4/2018 11:12 AM  
Dressing Type Transparent 10/4/2018 11:12 AM  
Hub Color/Line Status Pink;Flushed;Patent 10/4/2018 11:12 AM  
Action Taken Blood drawn 10/4/2018 11:12 AM  
  
 
Opportunity for questions and clarification was provided. Patient transported with: 
 [employer]

## 2018-10-04 NOTE — IP AVS SNAPSHOT
[facility] [facility] 
[phone] Patient: Richmond Rivera MRN: [id number] RAD:[birth year] You are allergic to the following No active allergies Recent Documentation Height Weight BMI OB Status Smoking Status 1.575 m 64 kg 25.81 kg/m2 Hysterectomy Never Smoker Emergency Contacts  (Rel.) Home Phone Work Phone Mobile Phone Pau Henderson [phone]  -- [phone] About your hospitalization You were admitted on:  October 4, 2018 You last received care in the:  [facility] You were discharged on:  October 11, 2018 Why you were hospitalized Your primary diagnosis was:  Acute On Chronic Respiratory Failure (Hcc) Your diagnoses also included: Altered Mental Status, Lung Cancer (Hcc), Shortness Of Breath Providers Seen During Your Hospitalization Provider Specialty Primary office phone Joshua Oreilly MD Emergency Medicine [phone] Pinky May MD Internal Medicine [phone] [address] MD Rhiannon Internal Medicine [phone] Garret Santana MD Internal Medicine [phone] Mercedes Oropeza MD Hospitalist [phone] Erlinda Bennett MD Hospitalist [phone] Your Primary Care Physician (PCP) Primary Care Physician Office Phone Office Fax Kirti Acharya [phone] Follow-up Information Follow up With Details Comments Contact Info Lillie Bamberger, MD In 1 week Practice will call patient for appointment. [address] [facility] 
[phone] Megan Bearden MD In 1 week Please call if acupunture is desired. [address] [place] [address] [address] [facility] Kenneth [address] 
[phone] Erick Mark MD On 10/12/2018 Hospital follow up appointment on Friday 10/12 @ 10:00 a.m. [address] 
[phone] Appointments for Next 14 days 10/17/2018  8:30 AM  [facility] FOLLOW UP [facility]  
 10/17/2018  8:30 AM  [facility] FOLLOW UP [facility] My Medications STOP taking these medications ALIMTA IV  
   
  
 AVASTIN IV  
   
  
  
TAKE these medications as instructed Instructions Each Dose to Equal  
 Morning Noon Evening Bedtime  
 acetaminophen 325 mg tablet Commonly known as:  TYLENOL Your last dose was: Your next dose is: Take 2 Tabs by mouth every six (6) hours as needed. Indications: Headache Disorder, Pain 650 mg  
    
   
   
   
  
 acyclovir 400 mg tablet Commonly known as:  ZOVIRAX Your last dose was: Your next dose is: Take 400 mg by mouth two (2) times a day. 400 mg  
    
   
   
   
  
 amLODIPine 10 mg tablet Commonly known as:  Santila Hayes Your last dose was: Your next dose is: Take 1 Tab by mouth daily. Indications: hypertension 10 mg  
    
   
   
   
  
 aspirin delayed-release 81 mg tablet Your last dose was: Your next dose is: Take 81 mg by mouth daily. 81 mg  
    
   
   
   
  
 Citracal + D tablet Generic drug:  calcium citrate-vitamin D3 Your last dose was: Your next dose is: Take 1 Tab by mouth two (2) times a day. 1 Tab COMPAZINE 10 mg tablet Generic drug:  prochlorperazine Your last dose was: Your next dose is: Take 5 mg by mouth every six (6) hours as needed for Nausea. 5 mg  
    
   
   
   
  
 dexamethasone 4 mg tablet Commonly known as:  DECADRON Your last dose was: Your next dose is: Take 2 Tabs by mouth every twelve (12) hours for 14 days. Per chemo regimen (day before, day of, day after) 8 mg  
    
   
   
   
  
 folic acid 1 mg tablet Commonly known as:  [employer] Your last dose was: Your next dose is: Take 1 mg by mouth daily. 1 mg  
    
   
   
   
  
 furosemide 20 mg tablet Commonly known as:  LASIX Your last dose was: Your next dose is: Take 20 mg by mouth daily as needed. Indications: Edema 20 mg HYDROmorphone 2 mg tablet Commonly known as:  DILAUDID Your last dose was: Your next dose is: Take 1 Tab by mouth every four (4) hours as needed for up to 7 days. Max Daily Amount: 12 mg.  
 2 mg  
    
   
   
   
  
 lactulose 10 gram/15 mL solution Commonly known as:  Zane Goltz Your last dose was: Your next dose is: Take 30 mL by mouth two (2) times a day for 14 days. 30 mL LORazepam 2 mg tablet Commonly known as:  ATIVAN Your last dose was: Your next dose is: Take 2 mg by mouth every eight (8) hours as needed for Anxiety. 2 mg  
    
   
   
   
  
 metoclopramide HCl 5 mg tablet Commonly known as:  REGLAN Your last dose was: Your next dose is: Take 1 Tab by mouth three (3) times daily as needed for up to 30 days. 5 mg  
    
   
   
   
  
 mirtazapine 15 mg disintegrating tablet Commonly known as:  REMERON SOL-TAB Your last dose was: Your next dose is: Take 1 Tab by mouth nightly. 15 mg  
    
   
   
   
  
 multivitamin tablet Commonly known as:  ONE A DAY Your last dose was: Your next dose is: Take 1 Tab by mouth daily. 1 Tab  
    
   
   
   
  
 naloxone 4 mg/actuation nasal spray Commonly known as:  [employer] Your last dose was: Your next dose is:    
   
   
 Use 1 spray intranasally, then discard. Repeat with new spray every 2 min as needed for opioid overdose symptoms, alternating nostrils. ondansetron hcl 8 mg tablet Commonly known as:  Jj Ferrisr Your last dose was: Your next dose is: Take 1 Tab by mouth every eight (8) hours as needed for Nausea. Indications: CANCER CHEMOTHERAPY-INDUCED NAUSEA AND VOMITING  
 8 mg  
    
   
   
   
  
 oxyCODONE ER 20 mg ER tablet Commonly known as:  OxyCONTIN Your last dose was: Your next dose is: Take 1 Tab by mouth every twelve (12) hours for 7 days. Max Daily Amount: 40 mg. Indications: Chronic Pain 20 mg  
    
   
   
   
  
 polyethylene glycol 17 gram packet Commonly known as:  Rohit Barriosr Your last dose was: Your next dose is: Take 1 Packet by mouth two (2) times a day for 14 days. 17 g PROCRIT 20,000 unit/mL injection Generic drug:  epoetin karla Your last dose was: Your next dose is:    
   
   
 20,000 Units by SubCUTAneous route every seven (7) days. Indications: CHEMOTHERAPY-INDUCED ANEMIA  
 [address] Units  
    
   
   
   
  
 senna-docusate 8.6-50 mg per tablet Commonly known as:  [place] Your last dose was: Your next dose is: Take 2 Tabs by mouth daily for 14 days. 2 Tab  
    
   
   
   
  
 triamcinolone 0.1 % lotion Commonly known as:  KENALOG Your last dose was: Your next dose is:    
   
   
 Apply  to affected area two (2) times a day. use thin layer Where to Get Your Medications Information on where to get these meds will be given to you by the nurse or doctor. ! Ask your nurse or doctor about these medications  
  dexamethasone 4 mg tablet HYDROmorphone 2 mg tablet  
 lactulose 10 gram/15 mL solution  
 metoclopramide HCl 5 mg tablet  
 naloxone 4 mg/actuation nasal spray  
 ondansetron hcl 8 mg tablet  
 oxyCODONE ER 20 mg ER tablet  
 polyethylene glycol 17 gram packet  
 senna-docusate 8.6-50 mg per tablet Discharge Instructions Discharge Instructions PATIENT ID: Ritchie Brittle MRN: [id number] YOB: 1955 DATE OF ADMISSION: 10/4/2018 10:37 AM   
DATE OF DISCHARGE: 10/11/2018 PRIMARY CARE PROVIDER: Dorothy Briones MD  
 
ATTENDING PHYSICIAN: Ashley Asencio 
DISCHARGING PROVIDER: Ashley Asencio MD   
To contact this individual call [phone] and ask the  to page. If unavailable ask to be transferred the Adult Hospitalist Department. DISCHARGE DIAGNOSES Acute on chronic respiratory failure could be due to the progression of her lung cancer AMS - ? Chemotherapy related, metastasis ruled out Malignancy related pain Malignancy related nausea CONSULTATIONS: IP CONSULT TO HOSPITALIST 
IP CONSULT TO ONCOLOGY 
IP CONSULT TO PALLIATIVE CARE - PROVIDER 
IP CONSULT TO NEUROLOGY PROCEDURES/SURGERIES: * No surgery found * PENDING TEST RESULTS:  
At the time of discharge the following test results are still pending: None FOLLOW UP APPOINTMENTS:  
Follow-up Information Follow up With Details Comments Contact Info Rasta Campbell MD In 1 week  [address] [facility] 
[phone] Mark Schultz MD In 1 week Please call if acupunture is desired. [address] [place] [address] [address] 
[phone] Napoleon Horner MD In 1 week Hospital follow up  [address] 
[phone] ADDITIONAL CARE RECOMMENDATIONS:  
1. Please make sure to take all medications as prescribed. - You should continue taking your scheduled pain medication: oxycontin 20mg every 12 hours, with as needed dilaudid. I have written an extra prescription for 7 days in case you do not have enough at home. - You should continue nausea medication to be taken AS NEEDED. I have provided a Reglan prescription for you when you go home. 2. You should follow up with your Oncology doctor in order to receive chemotherapy next week 3. The number for our palliative care team is provided above, should you wish to see them for acupuncture or any other symptomatic management. 4. Your MRI did not find a cause for your fogginess, and this is likely related to your malignancy and chemotherapy. You do not have any brain lesions. 5. Please come back to the hospital if you have untreated nausea and vomiting, unable to keep down anything by mouth including fluids, have intractable pain which is not helped with any pain medications. DIET: Regular Diet ACTIVITY: Activity as tolerated DISCHARGE MEDICATIONS: 
 See Medication Reconciliation Form · It is important that you take the medication exactly as they are prescribed. · Keep your medication in the bottles provided by the pharmacist and keep a list of the medication names, dosages, and times to be taken in your wallet. · Do not take other medications without consulting your doctor. NOTIFY YOUR PHYSICIAN FOR ANY OF THE FOLLOWING:  
Fever over 101 degrees for 24 hours. Chest pain, shortness of breath, fever, chills, nausea, vomiting, diarrhea, change in mentation, falling, weakness, bleeding. Severe pain or pain not relieved by medications. Or, any other signs or symptoms that you may have questions about. DISPOSITION: 
X  Home With: 
 OT  PT  [facility]  RN  
  
 SNF/Inpatient Rehab/LTAC Independent/assisted living Hospice Other: CDMP Checked: Yes X PROBLEM LIST Updated: Yes X Signed:  
Meghana Roblero MD 
10/11/2018 
8:23 AM 
 
Discharge Instructions Attachments/References CANCER PAIN: GENERAL INFO (ENGLISH) FATIGUE (ENGLISH) LUNG CANCER (ENGLISH) Discharge Orders None MyChart Announcement  We are excited to announce that we are making your provider's discharge notes available to you in [facility]. You will see these notes when they are completed and signed by the physician that discharged you from your recent hospital stay. If you have any questions or concerns about any information you see in [facility], please call the Health Information Department where you were seen or reach out to your Primary Care Provider for more information about your plan of care. Introducing [facility]! Dear Ashok Molina: Thank you for requesting a [facility] account. Our records indicate that you already have an active [facility] account. You can access your account anytime at [email or web address] Did you know that you can access your hospital and ER discharge instructions at any time in [facility]? You can also review all of your test results from your hospital stay or ER visit. Additional Information If you have questions, please visit the Frequently Asked Questions section of the [facility] website at [email or web address] Remember, [facility] is NOT to be used for urgent needs. For medical emergencies, dial 911. Now available from your iPhone and Android! General Information Please provide this summary of care documentation to your next provider. Patient Signature:  ____________________________________________________________ Date:  ____________________________________________________________  
  
[facility] Provider Signature:  ____________________________________________________________ Date:  ____________________________________________________________

## 2018-10-04 NOTE — ED NOTES
Pt to BR for urine sample, on 2L o2 nc. Pt sats in 80's upon returning to room. Sats returned to mid 90s after being sedentary for a few minutes. MD notified.

## 2018-10-04 NOTE — PROGRESS NOTES
TRANSFER - IN REPORT: 
 
Verbal report received from Venessa(name) on Leyda Hwang  being received from ED(unit) for routine progression of care Report consisted of patients Situation, Background, Assessment and  
Recommendations(SBAR). Information from the following report(s) SBAR, ED Summary, Intake/Output, MAR and Recent Results was reviewed with the receiving nurse. Opportunity for questions and clarification was provided. Assessment completed upon patients arrival to unit and care assumed. 1530:  Sammy Mcguire, RN in ED stated that the cardiac monitoring order was for the ED only. Not for the transfer floor.

## 2018-10-04 NOTE — PROGRESS NOTES
Admission Medication Reconciliation: 
 
Information obtained from: [address], patient Significant PMH/Disease States:  
Past Medical History:  
Diagnosis Date  Arthritis  Autoimmune disease ([facility] [address] lupus  Cancer ([facility] [address] lung cancer  Gastrointestinal disorder GERD  Hypertension Chief Complaint for this Admission:  SOB Allergies:  Review of patient's allergies indicates no known allergies. Prior to Admission Medications:  
Prior to Admission Medications Prescriptions Last Dose Informant Patient Reported? Taking? HYDROmorphone (DILAUDID) 2 mg tablet 10/4/2018 at Unknown time  Yes Yes Sig: Take 2 mg by mouth every four (4) hours as needed for Pain. LORazepam (ATIVAN) 2 mg tablet 10/4/2018 at Unknown time  Yes Yes Sig: Take 2 mg by mouth every eight (8) hours as needed for Anxiety. acetaminophen (TYLENOL) 325 mg tablet 2018 at Unknown time  No Yes Sig: Take 2 Tabs by mouth every six (6) hours as needed. Indications: Headache Disorder, Pain  
acyclovir (ZOVIRAX) 400 mg tablet 10/4/2018 at Unknown time  Yes Yes Sig: Take 400 mg by mouth two (2) times a day. amLODIPine (NORVASC) 10 mg tablet 10/4/2018 at Unknown time  No Yes Sig: Take 1 Tab by mouth daily. Indications: hypertension  
aspirin delayed-release 81 mg tablet 10/4/2018 at Unknown time  Yes Yes Sig: Take 81 mg by mouth daily. bevacizumab (AVASTIN IV) 2018  Yes No  
Sig: by IntraVENous route. calcium citrate-vitamin D3 (CITRACAL + D) tablet 10/4/2018 at Unknown time  Yes Yes Sig: Take 1 Tab by mouth two (2) times a day. dexamethasone (DECADRON) 4 mg tablet 10/4/2018 at Unknown time  Yes Yes Sig: Take 4 mg by mouth every twelve (12) hours. Per chemo regimen (day before, day of, day after)  
epoetin karla (PROCRIT) 20,000 unit/mL injection 2018  Yes Yes Si,000 Units by SubCUTAneous route every seven (7) days. Indications: CHEMOTHERAPY-INDUCED ANEMIA folic acid (FOLVITE) 1 mg tablet 10/4/2018 at Unknown time  Yes Yes Sig: Take 1 mg by mouth daily. furosemide (LASIX) 20 mg tablet 10/4/2018 at Unknown time  Yes Yes Sig: Take 20 mg by mouth daily as needed. Indications: Edema  
mirtazapine (REMERON SOL-TAB) 15 mg disintegrating tablet 10/3/2018 at Unknown time  No Yes Sig: Take 1 Tab by mouth nightly. multivitamin (ONE A DAY) tablet 10/4/2018 at Unknown time  Yes Yes Sig: Take 1 Tab by mouth daily. ondansetron hcl (ZOFRAN) 8 mg tablet 10/4/2018 at Unknown time  Yes Yes Sig: Take 8 mg by mouth every eight (8) hours as needed for Nausea. Indications: CANCER CHEMOTHERAPY-INDUCED NAUSEA AND VOMITING  
oxyCODONE ER (OXYCONTIN) 20 mg ER tablet 10/4/2018 at Unknown time  Yes Yes Sig: Take 20 mg by mouth every twelve (12) hours. Indications: Chronic Pain  
pemetrexed disodium (ALIMTA IV) 9/13/2018  Yes No  
Sig: by IntraVENous route. Once every 3 weeks. prochlorperazine (COMPAZINE) 10 mg tablet   Yes Yes Sig: Take 5 mg by mouth every six (6) hours as needed for Nausea. triamcinolone (KENALOG) 0.1 % lotion 10/4/2018 at Unknown time  Yes Yes Sig: Apply  to affected area two (2) times a day. use thin layer Facility-Administered Medications: None Comments/Recommendations: Daughter and  provided most of the medication and allergy information, patient provided additional details as necessary. Notes: 
1. Oncology treatments: every third week,  added her premeds. She is due to receive oncology agents tomorrow, has taken a.m. dose of her dexamethasone 2. Margarita Curtis: shows up on RX Query, therapy stopped because \"it wasn't working\". Added: 1. Procrit 2. Lorazepam 
3. Pre-meds 4. Furosemide (for edema) Deleted: 1. Omeprazole Thank you for allowing me to participate in the care of your patient. Annetta SmithD, RN #[id number] Note: 1. Acyclovir for ppx

## 2018-10-04 NOTE — H&P
[address]  HISTORY AND PHYSICAL Name:Deisi TYSON 
MR#: [id number] : 1955 ACCOUNT #: [de-identified] ADMIT DATE: 10/04/2018 CHIEF COMPLAINT:  Shortness of breath and hypoxia. HISTORY OF PRESENT ILLNESS:  This is a 42-year-old -American female with history of stage IV lung cancer with liver mass, GERD, lupus and hypertension. The patient feels increasing shortness of breath and dizziness last 2-3 days. She has chronic oxygen at home, but lately her oxygen requirement has increased to 2 liters from 1L/min. She feels more dizziness, as the room spinning at home after mild exertion. She have a chronic mild cough with mild midsternal pleuritic chest pain. Denied fevers. No sputum. Denied abdominal pain. The patient came to the ED, she was trying to ambulate into the bathroom with a nurse and noticed her sats down to 70s and due to this, she was recommended to further management of her hypoxia. REVIEW OF SYSTEMS: 
HEENT: no headache, no vision changes, no nose discharge, no hearing changes RES:hpi, + DRY COUGH  
CVS:+ cp, no palpitation. Muscular: no joint swelling, no muscle pain, no leg swelling Skin: no rash, no itching GI: no vomiting, no diarrhea : no dysuria, no hematuria Hemo: no gum bleeding, no petechial  
Neuro: no sensation changes, no focal weakness Endo: no polydipsia Psych: denied depression ALLERGIES:  PATIENT HAS NO KNOWN ALLERGY. CODE STATUS:  FULL CODE. PAST MEDICAL HISTORY:  Lung cancer, lupus, GI bleeding, hypertension. SOCIAL HISTORY:  , lives with . No smoking, no alcohol. LABORATORY DATA:  On admission, WBC 8.6, H and H 8.8/28, platelet 481. Sodium 135, BUN 16, creatinine 1. 11. Troponin negative. . CT of the chest, no PE. There are multiple pulmonary metastases slightly larger with spiculated nodules in the left lower lobe.   Also, very large right liver mass, trace right pleural effusion and small pericardial effusion. Compared to the previous CT of the chest in August looked like slightly worse. Medications:  
Prescriptions Last Dose Informant Patient Reported? Taking? HYDROmorphone (DILAUDID) 2 mg tablet 10/4/2018 at Unknown time   Yes Yes Sig: Take 2 mg by mouth every four (4) hours as needed for Pain. LORazepam (ATIVAN) 2 mg tablet 10/4/2018 at Unknown time   Yes Yes Sig: Take 2 mg by mouth every eight (8) hours as needed for Anxiety. acetaminophen (TYLENOL) 325 mg tablet 2018 at Unknown time   No Yes Sig: Take 2 Tabs by mouth every six (6) hours as needed. Indications: Headache Disorder, Pain  
acyclovir (ZOVIRAX) 400 mg tablet 10/4/2018 at Unknown time   Yes Yes Sig: Take 400 mg by mouth two (2) times a day. amLODIPine (NORVASC) 10 mg tablet 10/4/2018 at Unknown time   No Yes Sig: Take 1 Tab by mouth daily. Indications: hypertension  
aspirin delayed-release 81 mg tablet 10/4/2018 at Unknown time   Yes Yes Sig: Take 81 mg by mouth daily. bevacizumab (AVASTIN IV) 2018   Yes No  
Sig: by IntraVENous route. calcium citrate-vitamin D3 (CITRACAL + D) tablet 10/4/2018 at Unknown time   Yes Yes Sig: Take 1 Tab by mouth two (2) times a day. dexamethasone (DECADRON) 4 mg tablet 10/4/2018 at Unknown time   Yes Yes Sig: Take 4 mg by mouth every twelve (12) hours. Per chemo regimen (day before, day of, day after)  
epoetin karla (PROCRIT) 20,000 unit/mL injection 2018   Yes Yes Si,000 Units by SubCUTAneous route every seven (7) days. Indications: CHEMOTHERAPY-INDUCED ANEMIA  
folic acid (FOLVITE) 1 mg tablet 10/4/2018 at Unknown time   Yes Yes Sig: Take 1 mg by mouth daily. furosemide (LASIX) 20 mg tablet 10/4/2018 at Unknown time   Yes Yes Sig: Take 20 mg by mouth daily as needed. Indications: Edema  
mirtazapine (REMERON SOL-TAB) 15 mg disintegrating tablet 10/3/2018 at Unknown time   No Yes Sig: Take 1 Tab by mouth nightly. multivitamin (ONE A DAY) tablet 10/4/2018 at Unknown time   Yes Yes Sig: Take 1 Tab by mouth daily. ondansetron hcl (ZOFRAN) 8 mg tablet 10/4/2018 at Unknown time   Yes Yes Sig: Take 8 mg by mouth every eight (8) hours as needed for Nausea. Indications: CANCER CHEMOTHERAPY-INDUCED NAUSEA AND VOMITING  
oxyCODONE ER (OXYCONTIN) 20 mg ER tablet 10/4/2018 at Unknown time   Yes Yes Sig: Take 20 mg by mouth every twelve (12) hours. Indications: Chronic Pain  
pemetrexed disodium (ALIMTA IV) 9/13/2018   Yes No  
Sig: by IntraVENous route. Once every 3 weeks. prochlorperazine (COMPAZINE) 10 mg tablet     Yes Yes Sig: Take 5 mg by mouth every six (6) hours as needed for Nausea. triamcinolone (KENALOG) 0.1 % lotion 10/4/2018 at Unknown time   Yes Yes Sig: Apply  to affected area two (2) times a day. use thin layer PHYSICAL EXAMINATION: 
GENERAL:  The patient is comfortable in room, oriented x3, well-developed  female. VITAL SIGNS:  Temperature 98, heart rate 83, blood pressure 121/65, sats 95% on 2 liters now. HEENT:  Normal head, no trauma. Pupils equal, reactive to light. No icterus. No nose discharge, no ear discharge, no erythema in the mouth. Mucosa looks moist. 
NECK:  Supple, no JVD, no carotid bruits. No goiter. No lymph node palpable. CHEST:  Bilateral lower lung crackles. Decreased breath sounds bilateral lower lung, clear to percussion and midsternal palpate, mild pain and expansion symmetric bilateral. 
CARDIOVASCULAR:  Regular rate and rhythm, no murmur. PMI nondisplaced. Normal heart sounds. ABDOMEN:  Right upper quadrant, slightly tenderness with no rebound. Bowel sounds positive. Normal shape. EXTREMITIES:  Joint is normal.  No leg swelling. No calf tenderness. Pulses 2+ bilateral. 
SKIN:  No rashes. No open skin. NEUROLOGIC:  Normal sensation, normal strength. Cranial nerves intact. PSYCHIATRIC:  No depression. ASSESSMENT AND PLAN: 
1. Acute on chronic respiratory failure could be due to the progression of her lung cancer. The increase in the size of the liver mass possibly is making the diaphragms more elevated in the right side. The small pleural effusion may also contribute. We may try a small dose of Lasix to see if that will help her breathing. Patient has normal EF 50% back to August this year. If patient's respiratory status will be not improving after diuresis, very likely it is related to her lung cancer and probably this is not something medically we can manage. For now, we will consult oncology. Patient is due for chemo tomorrow and further decision will be based on how she responds to the treatment. We will continue O2 support. We will have PT, OT. 
2.  Gastroesophageal reflux disease. Continue PPI. 3.  Will monitor her renal function when on diuretics. MD OTTONIEL Loza/BRANDON 
D: 10/04/2018 15:18    
T: 10/04/2018 15:44 JOB #: [id number]

## 2018-10-04 NOTE — ED PROVIDER NOTES
HPI Comments: 58 y.o. female with past medical history significant for lung CA (on 2L NC at home), GERD, Lupus, and HTN presents with complaints of exertional dyspnea and dizziness which has become worse over the last 2 days. The pt states that \"I started to become short of breath about a week ago. I increased my oxygen from 1 L to 1.5 L, and 2 days ago when I started feeling more short of breath I increased it to 2 L. \"  The pt describes the dizziness as a room spinning sensation which is worse with exertion. She also complains of mild pleuritic chest pain which started around the same time as the shortness of breath. Denies fever, chills, nausea, vomiting, hemoptysis. There are no other acute medical complaints at this time. PCP: MD Ailyn Martinez PA-C Patient is a 58 y.o. female presenting with shortness of breath. Shortness of Breath Pertinent negatives include no fever, no rhinorrhea, no sore throat, no ear pain, no cough, no wheezing, no chest pain, no vomiting, no abdominal pain and no rash. Past Medical History:  
Diagnosis Date  Arthritis  Autoimmune disease ([facility] [address] lupus  Cancer ([facility] [address] lung cancer  Gastrointestinal disorder GERD  Hypertension Past Surgical History:  
Procedure Laterality Date  HX CARPAL TUNNEL RELEASE  HX  SECTION    
 HX HYSTERECTOMY History reviewed. No pertinent family history. Social History Social History  Marital status:  Spouse name: N/A  
 Number of children: N/A  
 Years of education: N/A Occupational History  Not on file. Social History Main Topics  Smoking status: Never Smoker  Smokeless tobacco: Never Used  Alcohol use No  
 Drug use: No  
 Sexual activity: Not on file Other Topics Concern  Not on file Social History Narrative ALLERGIES: Review of patient's allergies indicates no known allergies. Review of Systems Constitutional: Negative for activity change, appetite change, diaphoresis and fever. HENT: Negative for ear discharge, ear pain, facial swelling, rhinorrhea, sore throat, tinnitus, trouble swallowing and voice change. Eyes: Negative for photophobia, pain, discharge, redness and visual disturbance. Respiratory: Positive for shortness of breath. Negative for cough, chest tightness, wheezing and stridor. Cardiovascular: Negative for chest pain and palpitations. Gastrointestinal: Negative for abdominal pain, constipation, diarrhea, nausea and vomiting. Endocrine: Negative for polydipsia and polyuria. Genitourinary: Negative for dysuria, flank pain and hematuria. Musculoskeletal: Negative for arthralgias, back pain and myalgias. Skin: Negative for color change and rash. Neurological: Negative for dizziness, syncope, speech difficulty, light-headedness and numbness. Psychiatric/Behavioral: Negative for behavioral problems. Vitals:  
 10/04/18 1200 10/04/18 1300 10/04/18 1321 10/04/18 1329 BP: 118/69 119/58 143/72 Pulse:      
Resp:      
Temp:      
SpO2: 96% 91% (!) 86% 97% Weight:      
Height:      
      
 
Physical Exam  
Constitutional: She is oriented to person, place, and time. Pt on 2 L NC. Chronically ill appearing. HENT:  
Head: Normocephalic and atraumatic. Eyes: Conjunctivae are normal. Pupils are equal, round, and reactive to light. Right eye exhibits no discharge. Left eye exhibits no discharge. Neck: Normal range of motion. Neck supple. No thyromegaly present. Cardiovascular: Normal rate, regular rhythm and normal heart sounds. Exam reveals no gallop and no friction rub. No murmur heard. Pulmonary/Chest: Effort normal. No respiratory distress. She has no wheezes. + crackles through out both lower lung fields. Abdominal: Soft. Bowel sounds are normal. She exhibits no distension. There is no tenderness. There is no rebound. Abdomen NTTP. Musculoskeletal: Normal range of motion. Neurological: She is alert and oriented to person, place, and time. Skin: Skin is warm. Psychiatric: She has a normal mood and affect. MDM Number of Diagnoses or Management Options Exertional dyspnea: Hypoxia:  
Pericardial effusion:  
Pleural effusion:  
Diagnosis management comments: CT revealed pleural effusion and possible new pericardial effusion. No PE. Added on BNP and spoke with hospitalist (Dr. Sakina Godinez) who will admit for hypoxia and exertional dyspnea. Reviewed treatment plan with attending and they agree. [facility] 
 
 
 
ED Course Procedures

## 2018-10-04 NOTE — PROGRESS NOTES
Primary Nurse efrem rodriguez and tamara RN performed a dual skin assessment on this patient Impairment noted- 2 bruises noted to abdomen/wounds to right and left buttocks followed outpatient wound care, will consult wound care Arthur score is 19

## 2018-10-04 NOTE — ED TRIAGE NOTES
Triage Note: Patient is coming in with chest pain and shortness of breath x 2 days. Patient has history of lung cancer and is on home O2 at [facility].

## 2018-10-05 LAB
ALBUMIN SERPL-MCNC: 1.9 G/DL (ref 3.5–5)
ALBUMIN/GLOB SERPL: 0.4 {RATIO} (ref 1.1–2.2)
ALP SERPL-CCNC: 122 U/L (ref 45–117)
ALT SERPL-CCNC: 32 U/L (ref 12–78)
ANION GAP SERPL CALC-SCNC: 8 MMOL/L (ref 5–15)
AST SERPL-CCNC: 52 U/L (ref 15–37)
BASOPHILS # BLD: 0 K/UL (ref 0–0.1)
BASOPHILS NFR BLD: 0 % (ref 0–1)
BILIRUB SERPL-MCNC: 0.3 MG/DL (ref 0.2–1)
BUN SERPL-MCNC: 16 MG/DL (ref 6–20)
BUN/CREAT SERPL: 17 (ref 12–20)
CALCIUM SERPL-MCNC: 8.2 MG/DL (ref 8.5–10.1)
CHLORIDE SERPL-SCNC: 106 MMOL/L (ref 97–108)
CO2 SERPL-SCNC: 23 MMOL/L (ref 21–32)
CREAT SERPL-MCNC: 0.94 MG/DL (ref 0.55–1.02)
DIFFERENTIAL METHOD BLD: ABNORMAL
EOSINOPHIL # BLD: 0 K/UL (ref 0–0.4)
EOSINOPHIL NFR BLD: 0 % (ref 0–7)
ERYTHROCYTE [DISTWIDTH] IN BLOOD BY AUTOMATED COUNT: 25.8 % (ref 11.5–14.5)
GLOBULIN SER CALC-MCNC: 5.3 G/DL (ref 2–4)
GLUCOSE SERPL-MCNC: 143 MG/DL (ref 65–100)
HCT VFR BLD AUTO: 26.7 % (ref 35–47)
HGB BLD-MCNC: 8.5 G/DL (ref 11.5–16)
IMM GRANULOCYTES # BLD: 0 K/UL
IMM GRANULOCYTES NFR BLD AUTO: 0 %
LYMPHOCYTES # BLD: 0.9 K/UL (ref 0.8–3.5)
LYMPHOCYTES NFR BLD: 12 % (ref 12–49)
MAGNESIUM SERPL-MCNC: 2.3 MG/DL (ref 1.6–2.4)
MCH RBC QN AUTO: 27.6 PG (ref 26–34)
MCHC RBC AUTO-ENTMCNC: 31.8 G/DL (ref 30–36.5)
MCV RBC AUTO: 86.7 FL (ref 80–99)
MONOCYTES # BLD: 0.7 K/UL (ref 0–1)
MONOCYTES NFR BLD: 10 % (ref 5–13)
NEUTS BAND NFR BLD MANUAL: 1 % (ref 0–6)
NEUTS SEG # BLD: 5.7 K/UL (ref 1.8–8)
NEUTS SEG NFR BLD: 77 % (ref 32–75)
NRBC # BLD: 0 K/UL (ref 0–0.01)
NRBC BLD-RTO: 0 PER 100 WBC
PHOSPHATE SERPL-MCNC: 3.5 MG/DL (ref 2.6–4.7)
PLATELET # BLD AUTO: 504 K/UL (ref 150–400)
PLATELET COMMENTS,PCOM: ABNORMAL
PMV BLD AUTO: 10.6 FL (ref 8.9–12.9)
POTASSIUM SERPL-SCNC: 4.3 MMOL/L (ref 3.5–5.1)
PROT SERPL-MCNC: 7.2 G/DL (ref 6.4–8.2)
RBC # BLD AUTO: 3.08 M/UL (ref 3.8–5.2)
RBC MORPH BLD: ABNORMAL
RBC MORPH BLD: ABNORMAL
SODIUM SERPL-SCNC: 137 MMOL/L (ref 136–145)
WBC # BLD AUTO: 7.3 K/UL (ref 3.6–11)

## 2018-10-05 PROCEDURE — G8979 MOBILITY GOAL STATUS: HCPCS

## 2018-10-05 PROCEDURE — 97165 OT EVAL LOW COMPLEX 30 MIN: CPT | Performed by: OCCUPATIONAL THERAPIST

## 2018-10-05 PROCEDURE — G8978 MOBILITY CURRENT STATUS: HCPCS

## 2018-10-05 PROCEDURE — 84100 ASSAY OF PHOSPHORUS: CPT | Performed by: HOSPITALIST

## 2018-10-05 PROCEDURE — 74011000250 HC RX REV CODE- 250: Performed by: HOSPITALIST

## 2018-10-05 PROCEDURE — 74011250637 HC RX REV CODE- 250/637: Performed by: HOSPITALIST

## 2018-10-05 PROCEDURE — 97535 SELF CARE MNGMENT TRAINING: CPT | Performed by: OCCUPATIONAL THERAPIST

## 2018-10-05 PROCEDURE — 36415 COLL VENOUS BLD VENIPUNCTURE: CPT | Performed by: HOSPITALIST

## 2018-10-05 PROCEDURE — 97161 PT EVAL LOW COMPLEX 20 MIN: CPT

## 2018-10-05 PROCEDURE — 77010033678 HC OXYGEN DAILY

## 2018-10-05 PROCEDURE — 65270000029 HC RM PRIVATE

## 2018-10-05 PROCEDURE — 83735 ASSAY OF MAGNESIUM: CPT | Performed by: HOSPITALIST

## 2018-10-05 PROCEDURE — 80053 COMPREHEN METABOLIC PANEL: CPT | Performed by: HOSPITALIST

## 2018-10-05 PROCEDURE — 74011250636 HC RX REV CODE- 250/636: Performed by: HOSPITALIST

## 2018-10-05 PROCEDURE — 74011250636 HC RX REV CODE- 250/636: Performed by: INTERNAL MEDICINE

## 2018-10-05 PROCEDURE — 85025 COMPLETE CBC W/AUTO DIFF WBC: CPT | Performed by: HOSPITALIST

## 2018-10-05 PROCEDURE — 97116 GAIT TRAINING THERAPY: CPT

## 2018-10-05 PROCEDURE — 3E03305 INTRODUCTION OF OTHER ANTINEOPLASTIC INTO PERIPHERAL VEIN, PERCUTANEOUS APPROACH: ICD-10-PCS | Performed by: INTERNAL MEDICINE

## 2018-10-05 RX ORDER — PROCHLORPERAZINE MALEATE 5 MG
5 TABLET ORAL
Status: DISCONTINUED | OUTPATIENT
Start: 2018-10-05 | End: 2018-10-10

## 2018-10-05 RX ORDER — AMOXICILLIN 250 MG
2 CAPSULE ORAL DAILY
Status: DISCONTINUED | OUTPATIENT
Start: 2018-10-05 | End: 2018-10-11 | Stop reason: HOSPADM

## 2018-10-05 RX ORDER — POLYETHYLENE GLYCOL 3350 17 G/17G
17 POWDER, FOR SOLUTION ORAL 2 TIMES DAILY
Status: DISCONTINUED | OUTPATIENT
Start: 2018-10-05 | End: 2018-10-11 | Stop reason: HOSPADM

## 2018-10-05 RX ORDER — DEXAMETHASONE 4 MG/1
8 TABLET ORAL EVERY 12 HOURS
Status: DISCONTINUED | OUTPATIENT
Start: 2018-10-05 | End: 2018-10-11 | Stop reason: HOSPADM

## 2018-10-05 RX ADMIN — Medication 10 ML: at 06:21

## 2018-10-05 RX ADMIN — ACYCLOVIR 400 MG: 800 TABLET ORAL at 08:45

## 2018-10-05 RX ADMIN — ENOXAPARIN SODIUM 40 MG: 100 INJECTION SUBCUTANEOUS at 17:00

## 2018-10-05 RX ADMIN — ONDANSETRON HYDROCHLORIDE 4 MG: 2 INJECTION INTRAMUSCULAR; INTRAVENOUS at 15:18

## 2018-10-05 RX ADMIN — FUROSEMIDE 40 MG: 10 INJECTION, SOLUTION INTRAMUSCULAR; INTRAVENOUS at 08:45

## 2018-10-05 RX ADMIN — Medication 10 ML: at 08:50

## 2018-10-05 RX ADMIN — FOLIC ACID 1 MG: 1 TABLET ORAL at 08:45

## 2018-10-05 RX ADMIN — DEXAMETHASONE 8 MG: 4 TABLET ORAL at 23:11

## 2018-10-05 RX ADMIN — OXYCODONE HYDROCHLORIDE 20 MG: 20 TABLET, FILM COATED, EXTENDED RELEASE ORAL at 18:02

## 2018-10-05 RX ADMIN — ASPIRIN 81 MG: 81 TABLET, COATED ORAL at 08:45

## 2018-10-05 RX ADMIN — SENNOSIDES AND DOCUSATE SODIUM 2 TABLET: 8.6; 5 TABLET ORAL at 12:42

## 2018-10-05 RX ADMIN — POLYETHYLENE GLYCOL 3350 17 G: 17 POWDER, FOR SOLUTION ORAL at 12:36

## 2018-10-05 RX ADMIN — OXYCODONE HYDROCHLORIDE 20 MG: 20 TABLET, FILM COATED, EXTENDED RELEASE ORAL at 06:21

## 2018-10-05 RX ADMIN — Medication 10 ML: at 12:37

## 2018-10-05 RX ADMIN — CALCIUM CARBONATE-VITAMIN D TAB 500 MG-200 UNIT 1 TABLET: 500-200 TAB at 08:44

## 2018-10-05 RX ADMIN — ACYCLOVIR 400 MG: 800 TABLET ORAL at 17:00

## 2018-10-05 RX ADMIN — AMLODIPINE BESYLATE 10 MG: 5 TABLET ORAL at 08:44

## 2018-10-05 RX ADMIN — CALCIUM CARBONATE-VITAMIN D TAB 500 MG-200 UNIT 1 TABLET: 500-200 TAB at 17:00

## 2018-10-05 RX ADMIN — ONDANSETRON HYDROCHLORIDE 4 MG: 2 INJECTION INTRAMUSCULAR; INTRAVENOUS at 15:16

## 2018-10-05 RX ADMIN — DEXAMETHASONE 4 MG: 4 TABLET ORAL at 08:45

## 2018-10-05 RX ADMIN — HYDROMORPHONE HYDROCHLORIDE 2 MG: 2 TABLET ORAL at 13:02

## 2018-10-05 RX ADMIN — PROCHLORPERAZINE MALEATE 5 MG: 5 TABLET, FILM COATED ORAL at 17:00

## 2018-10-05 RX ADMIN — ONDANSETRON HYDROCHLORIDE 4 MG: 2 INJECTION INTRAMUSCULAR; INTRAVENOUS at 23:11

## 2018-10-05 RX ADMIN — HYDROMORPHONE HYDROCHLORIDE 2 MG: 2 TABLET ORAL at 19:18

## 2018-10-05 RX ADMIN — Medication 10 ML: at 23:13

## 2018-10-05 NOTE — PROGRESS NOTES
Bedside and Verbal shift change report given to Jack Mendoza (oncoming nurse) by Nicolle Reynolds (offgoing nurse). Report included the following information SBAR.

## 2018-10-05 NOTE — PROGRESS NOTES
Problem: Falls - Risk of 
Goal: *Absence of Falls Document Travis Boeck Fall Risk and appropriate interventions in the flowsheet. Outcome: Progressing Towards Goal 
Fall Risk Interventions: 
  
 
  
 
Medication Interventions: Patient to call before getting OOB Problem: Pressure Injury - Risk of 
Goal: *Prevention of pressure injury Document Arthur Scale and appropriate interventions in the flowsheet. Outcome: Progressing Towards Goal 
Pressure Injury Interventions: Activity Interventions: Increase time out of bed Mobility Interventions: Float heels, HOB 30 degrees or less, Pressure redistribution bed/mattress (bed type), PT/OT evaluation Nutrition Interventions: Document food/fluid/supplement intake, Discuss nutritional consult with provider, Offer support with meals,snacks and hydration Friction and Shear Interventions: HOB 30 degrees or less, Lift sheet, Lift team/patient mobility team, Minimize layers

## 2018-10-05 NOTE — CONSULTS
Hematology / Oncology ([facility])     Admit date: 10/4/2018   Subjective:     Jocelyne Evangelista is a 58 y.o. 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 S/p gefitinib and tagrisso    She was diagnosed with progression 2018 and started alimta/avastin s/p C2 on 18    She was admitted with worsening dyspnea over the last few days. Repeat CT here shows no PE. Possible slight progression in the lungs. Small pleural and pericardial effusions noted. She denies fever, chills,  No cough. dyspnea with minimal exertion    Complete ROS is notable for    Patient Active Problem List    Diagnosis Date Noted    Acute on chronic respiratory failure ([facility] [address] 10/04/2018    Lung cancer ([facility] [address] 2018    Non-pressure chronic ulcer of buttock ([facility] [address] 2018    Shortness of breath 2018     Past Medical History:   Diagnosis Date    Arthritis     Autoimmune disease ([facility] [address]     lupus    Cancer ([facility] [address]     lung cancer    Gastrointestinal disorder     GERD    Hypertension       Past Surgical History:   Procedure Laterality Date    HX CARPAL TUNNEL RELEASE      HX  SECTION      HX HYSTERECTOMY          -Social Hx-  Social History   Substance Use Topics    Smoking status: Never Smoker    Smokeless tobacco: Never Used    Alcohol use No        -Family Hx-  History reviewed.  No pertinent family history.     -Allergies-   No Known Allergies     -Home Medications-    -Medications-  Current Facility-Administered Medications   Medication Dose Route Frequency    polyethylene glycol (MIRALAX) packet 17 g  17 g Oral BID    senna-docusate (PERICOLACE) 8.6-50 mg per tablet 2 Tab  2 Tab Oral DAILY    amLODIPine (NORVASC) tablet 10 mg  10 mg Oral DAILY    acyclovir (ZOVIRAX) tablet 400 mg  400 mg Oral BID    aspirin delayed-release tablet 81 mg  81 mg Oral DAILY    calcium-vitamin D (OS-KENIA) 500 mg-200 unit tablet  1 Tab Oral BID    dexamethasone (DECADRON) tablet 4 mg  4 mg Oral R37D    folic acid (FOLVITE) tablet 1 mg  1 mg Oral DAILY    HYDROmorphone (DILAUDID) tablet 2 mg  2 mg Oral Q4H PRN    LORazepam (ATIVAN) tablet 2 mg  2 mg Oral Q8H PRN    mirtazapine (REMERON SOL-TAB) disintegrating tablet 15 mg  15 mg Oral QHS    oxyCODONE ER (OxyCONTIN) tablet 20 mg  20 mg Oral Q12H    prochlorperazine (COMPAZINE) tablet 5 mg  5 mg Oral Q6H PRN    furosemide (LASIX) injection 40 mg  40 mg IntraVENous DAILY    sodium chloride (NS) flush 5-10 mL  5-10 mL IntraVENous Q8H    sodium chloride (NS) flush 5-10 mL  5-10 mL IntraVENous PRN    naloxone (NARCAN) injection 0.4 mg  0.4 mg IntraVENous PRN    enoxaparin (LOVENOX) injection 40 mg  40 mg SubCUTAneous Q24H    acetaminophen (TYLENOL) tablet 650 mg  650 mg Oral Q4H PRN    ondansetron (ZOFRAN) injection 4 mg  4 mg IntraVENous Q4H PRN        Objective:   Patient Vitals for the past 24 hrs:   Temp Pulse Resp BP SpO2   10/05/18 1453 97.8 °F (36.6 °C) 65 16 126/78 98 %   10/05/18 0848 - - - - (!) 87 %   10/05/18 0845 97.6 °F (36.4 °C) 78 18 134/78 94 %   10/05/18 0048 98.9 °F (37.2 °C) 69 18 131/77 93 %   10/04/18 2116 98.1 °F (36.7 °C) 68 18 135/79 94 %   10/04/18 1612 98.3 °F (36.8 °C) 77 18 140/83 93 %          -Physical Exam-    Pleasant, NAD  Oral cavity moist, PERRL  No ELDA neck / axillae  CTAB  RRR no MRG  abd S/nt/nd, no hsm or masses  extr no edema  Skin: no petechiae or excessive bruising  CN ii-xii intact, strength intact        -Labs-    Recent Labs      10/05/18   0430  10/04/18   1108   WBC  7.3  8.6   HGB  8.5*  8.8*   PLT  504*  586*   ANEU  5.7  6.7   NA  137  135*   K  4.3  3.9   GLU  143*  164*   BUN  16  16   CREA  0.94  1.11*   ALT  32   --    SGOT  52*   --    TBILI  0.3   --    AP  122*   --    CA  8.2*  8.0*   MG  2.3  2.2   PHOS  3.5   --        -Imaging-   Chest CT  IMPRESSION:   1. No pulmonary embolus. 2. Multiple pulmonary metastases. Slightly larger, spiculated nodule in the left  lower lobe. 3. Very large right hepatic mass.    4. Trace right pleural effusion and small pericardial effusion.    -Assessment + Plan-       *)  Lung cancer   On chemo as above. Cycle 3 was to be today   Hold chemo    *)  Dyspnea   No clear evidence of pneumonia   No PE   I will increase decadron to see if that improves symptoms    *)  Pericardial effusion   TTE to eval for hemodynamic compromise    *)  Anemia   Monitor    *)  DVT proph with lovenox  Following. ..

## 2018-10-05 NOTE — PROGRESS NOTES
65 Notified Dr Brittaney Gutierrez MD on call for Dr Judy Garcia, of Oncology consult. Dr Edward Appiah stated that Dr. Viridiana Culver will round on the patient tomorrow.

## 2018-10-05 NOTE — PROGRESS NOTES
Problem: Falls - Risk of 
Goal: *Absence of Falls Document Tiburcio Isai Fall Risk and appropriate interventions in the flowsheet. Outcome: Progressing Towards Goal 
Fall Risk Interventions: 
  
 
  
 
Medication Interventions: Patient to call before getting OOB

## 2018-10-05 NOTE — WOUND CARE
Wound Care Note:  
 
New consult placed by nurse request for right and left buttock wounds, followed outpatient Chart shows: 
Admitted for acute on chronic respiratory failure Past Medical History:  
Diagnosis Date  Arthritis  Autoimmune disease ([facility] [address] lupus  Cancer ([facility] [address] lung cancer  Gastrointestinal disorder GERD  Hypertension WBC = 7.3 on 10/5/18 Admitted from home Assessment:  
Patient is A&O x 4, communicative, continent with no assistance needed in repositioning. Diet: Regular Bilateral heels and sacral skin intact and without erythema. 1. POA left buttock with two thin linear lines in an area measuring 2.2 cm x 2 cm x 0.1 cm, wounds have mostly healed with two small wounds slightly open, no drainage, vy-wound intact, wound edges are open. Hydrocolloid applied. 2.  POA right buttock with small circular epithealized areas. Left open to air. Spoke with Dr. Valeria Owusu, wound care orders obtained. Patient assisted to chair with PT. Recommendations:   
Left buttock- Please maintain Duoderm up to one week or change as needed with soiling or rolled edges. Please use adhesive remover wipes when changing. Skin Care & Pressure Prevention: 
Minimize layers of linen/pads under patient to optimize support surface. Turn/reposition approximately every 2 hours and offload heels. Promote continence Discussed above plan with patient Emilie Simon RN Transition of Care: Plan to follow as needed while admitted to hospital. 
 
EDUARD AlbrightN, RN, [facility], [facility]. 
office [phone] 
pager [phone] or call  to page

## 2018-10-05 NOTE — PROGRESS NOTES
Hospitalist Progress Note Monique Lozada MD 
     
                             Answering service: [phone] OR [phone] from in house phone Date of Service:  10/5/2018 NAME:  Tremaine Prado :  1955 MRN:  [id number] Admission Summary: This is a 50-year-old -American female with history of stage IV lung cancer with liver mass presented with dyspnea and increased oxygen requirement. She normally uses oxygen via nasal canula 1 l/min. Reason for follow up:  
She says her breathing is better. She denies fever. Has epigastric ,right abdominal pain. Assessment & Plan:  
Acute on chronic respiratory failure could be due to the progression of her lung cancer. Minimal pleural effusion. Last admission she had work up for the hypoxia. PFT indicated restrictive pattern,pulmonary had evaluated. She was then discharged on home oxygen. -CTA lung is negative for PE. There are multiple lesions , several subsegmental bronchi obstructed by the tumor. A large liver mass is present 
-Undergoing chemotherapy,today was cycle 3. 
-Lasix seems to help,she as prn order at home 
-Oncology consulted. Gastroesophageal reflux disease. Continue PPI. Normocytic anemia likely of chronic disease,cancer,lupus: stable. Monitor Lupus stable. Diet:cardiac Code status: full DVT prophylaxis: scd Care Plan discussed with: patient,daughter Hospital Problems  Date Reviewed: 2018 Codes Class Noted POA * (Principal)Acute on chronic respiratory failure (HCC) ICD-10-CM: J96.20 ICD-9-CM: 518.84  10/4/2018 Yes Review of Systems: A comprehensive review of systems was negative except for that written in the HPI. Physical Examination:  
 
 Last 24hrs VS reviewed since prior progress note. Most recent are: Visit Vitals  /78  Pulse 78  Temp 97.6 °F (36.4 °C)  Resp 18  Ht 5' 2.5\" (1.588 m)  Wt 64 kg (141 lb)  SpO2 (!) 87%  BMI 25.38 kg/m2 Constitutional:  No acute distress, cooperative, pleasant   
HEENT: Head is a traumatic, Un icteric sclera. Pink conjunctiva,no erythema or discharge. Oral mucous moist, oropharynx benign. Neck supple, Resp:  CTA bilaterally. Rhonchi on the RLL posteriorly. CV:  Regular rhythm, normal rate, no murmurs, gallops, rubs GI:  Soft, non distended, non tender. normoactive bowel sounds, no hepatosplenomegaly :  No CVA or suprapubic tenderness Skin  :  No erythema,rash,bullae,dipigmentation Musculoskeletal:  Pitting bilateral leg edema; warm, 2+ pulses throughout Neurologic:  AAOx3, CN II-XII reviewed. Moves all extremities. Psych:  Good insight, Not anxious nor agitated. Intake/Output Summary (Last 24 hours) at 10/05/18 1308 Last data filed at 10/04/18 1933 Gross per 24 hour Intake              240 ml Output              600 ml Net             -360 ml Data Review:  
 Review and/or order of clinical lab test 
Review and/or order of tests in the radiology section of CPT Review and/or order of tests in the medicine section of CPT Labs:  
 
Recent Labs 10/05/18 
 0430  10/04/18 
 1108 WBC  7.3  8.6 HGB  8.5*  8.8* HCT  26.7*  28.6*  
PLT  504*  586* Recent Labs 10/05/18 
 0430  10/04/18 
 1108 NA  137  135* K  4.3  3.9 CL  106  103 CO2  23  23 BUN  16  16 CREA  0.94  1.11* GLU  143*  164* CA  8.2*  8.0*  
MG  2.3  2.2 PHOS  3.5   --   
 
Recent Labs 10/05/18 
 0430 SGOT  52* ALT  32 AP  122* TBILI  0.3 TP  7.2 ALB  1.9*  
GLOB  5.3* No results for input(s): INR, PTP, APTT in the last 72 hours. No lab exists for component: INREXT No results for input(s): FE, TIBC, PSAT, FERR in the last 72 hours.   
No results found for: FOL, RBCF  
 No results for input(s): PH, PCO2, PO2 in the last 72 hours. Recent Labs 10/04/18 
 1108 TROIQ  <0.05 No results found for: CHOL, CHOLX, CHLST, CHOLV, HDL, LDL, LDLC, DLDLP, TGLX, TRIGL, TRIGP, CHHD, CHHDX Lab Results Component Value Date/Time Glucose (POC) 119 (H) 08/31/2018 11:10 AM  
 Glucose (POC) 94 08/31/2018 06:40 AM  
 Glucose (POC) 136 (H) 08/30/2018 10:12 PM  
 Glucose (POC) 114 (H) 08/30/2018 04:15 PM  
 Glucose (POC) 133 (H) 08/30/2018 11:06 AM  
 
Lab Results Component Value Date/Time Color YELLOW/STRAW 10/04/2018 01:19 PM  
 Appearance CLEAR 10/04/2018 01:19 PM  
 Specific gravity 1.019 10/04/2018 01:19 PM  
 pH (UA) 6.5 10/04/2018 01:19 PM  
 Protein NEGATIVE  10/04/2018 01:19 PM  
 Glucose NEGATIVE  10/04/2018 01:19 PM  
 Ketone NEGATIVE  10/04/2018 01:19 PM  
 Bilirubin NEGATIVE  10/04/2018 01:19 PM  
 Urobilinogen 0.2 10/04/2018 01:19 PM  
 Nitrites NEGATIVE  10/04/2018 01:19 PM  
 Leukocyte Esterase NEGATIVE  10/04/2018 01:19 PM  
 Epithelial cells FEW 10/04/2018 01:19 PM  
 Bacteria NEGATIVE  10/04/2018 01:19 PM  
 WBC 0-4 10/04/2018 01:19 PM  
 RBC 0-5 10/04/2018 01:19 PM  
 
 
 
Medications Reviewed:  
 
Current Facility-Administered Medications Medication Dose Route Frequency  polyethylene glycol (MIRALAX) packet 17 g  17 g Oral BID  senna-docusate (PERICOLACE) 8.6-50 mg per tablet 2 Tab  2 Tab Oral DAILY  amLODIPine (NORVASC) tablet 10 mg  10 mg Oral DAILY  acyclovir (ZOVIRAX) tablet 400 mg  400 mg Oral BID  aspirin delayed-release tablet 81 mg  81 mg Oral DAILY  calcium-vitamin D (OS-KENIA) 500 mg-200 unit tablet  1 Tab Oral BID  dexamethasone (DECADRON) tablet 4 mg  4 mg Oral R46Z  
 folic acid (FOLVITE) tablet 1 mg  1 mg Oral DAILY  HYDROmorphone (DILAUDID) tablet 2 mg  2 mg Oral Q4H PRN  
 LORazepam (ATIVAN) tablet 2 mg  2 mg Oral Q8H PRN  mirtazapine (REMERON SOL-TAB) disintegrating tablet 15 mg  15 mg Oral QHS  oxyCODONE ER (OxyCONTIN) tablet 20 mg  20 mg Oral Q12H  prochlorperazine (COMPAZINE) tablet 5 mg  5 mg Oral Q6H PRN  
 furosemide (LASIX) injection 40 mg  40 mg IntraVENous DAILY  sodium chloride (NS) flush 5-10 mL  5-10 mL IntraVENous Q8H  
 sodium chloride (NS) flush 5-10 mL  5-10 mL IntraVENous PRN  
 naloxone (NARCAN) injection 0.4 mg  0.4 mg IntraVENous PRN  
 enoxaparin (LOVENOX) injection 40 mg  40 mg SubCUTAneous Q24H  
 acetaminophen (TYLENOL) tablet 650 mg  650 mg Oral Q4H PRN  
 ondansetron (ZOFRAN) injection 4 mg  4 mg IntraVENous Q4H PRN  
 
______________________________________________________________________ EXPECTED LENGTH OF STAY: 3d 19h ACTUAL LENGTH OF STAY:          1 Rohit Woods MD

## 2018-10-05 NOTE — PROGRESS NOTES
Occupational Therapy EVALUATION/discharge Patient: Sandra Byrne (84 y.o. female) Date: 10/5/2018 Primary Diagnosis: Acute on chronic respiratory failure (HCC) Precautions:  Fall ASSESSMENT:  
Based on the objective data described below, the patient presents at an overall supervision to mod I level with ADLs and functional mobility. She is limited by decreased endurance, R shoulder ROM and strength, but with good safety awareness and no LOB. Educated pt and her daughter on energy conservation techniques including PLB with written handout provided. Both verbalized good understanding. Pt has good family support at home. Further skilled acute occupational therapy is not indicated at this time. Discharge Recommendations: None for OT Further Equipment Recommendations for Discharge: none for OT SUBJECTIVE:  
Patient stated I am ok.  OBJECTIVE DATA SUMMARY:  
HISTORY:  
Past Medical History:  
Diagnosis Date  Arthritis  Autoimmune disease ([facility] [address] lupus  Cancer ([facility] [address] lung cancer  Gastrointestinal disorder GERD  Hypertension Past Surgical History:  
Procedure Laterality Date  HX CARPAL TUNNEL RELEASE  HX  SECTION    
 HX HYSTERECTOMY Prior Level of Function/Environment/Context: gets assist from  on UE dressing and tub teansfers Home Situation Home Environment: Private residence # Steps to Enter: 2 Rails to Enter: Yes One/Two Story Residence: Two story, live on 1st floor Living Alone: No 
Support Systems: Spouse/Significant Other/Partner, Child(nany) Patient Expects to be Discharged to[de-identified] Private residence Current DME Used/Available at Home: Oxygen, portable Tub or Shower Type: Tub/Shower combination Hand dominance: Right EXAMINATION OF PERFORMANCE DEFICITS: 
Cognitive/Behavioral Status: 
Neurologic State: Alert; Appropriate for age Orientation Level: Oriented X4 
 Cognition: Appropriate for age attention/concentration; Appropriate decision making; Appropriate safety awareness; Follows commands Perception: Appears intact Perseveration: No perseveration noted Safety/Judgement: Awareness of environment;Driving appropriateness; Fall prevention;Good awareness of safety precautions; Home safety; Insight into deficits Hearing: Auditory Auditory Impairment: None Vision/Perceptual:   
 
Acuity: Impaired far vision Corrective Lenses: Glasses Range of Motion: 
AROM: Generally decreased, functional (imp R shoulder due to CA) PROM: Generally decreased, functional 
  
  
  
  
  
  
 
Strength: 
Strength: Generally decreased, functional 
  
  
  
  
 
Coordination: 
Coordination: Generally decreased, functional (within AROM) Fine Motor Skills-Upper: Left Intact; Right Intact Gross Motor Skills-Upper: Left Intact; Right Impaired Tone & Sensation: 
Tone: Normal 
  
  
  
  
  
  
  
 
Balance: 
Sitting: Intact; Without support Standing: Intact; Without support Functional Mobility and Transfers for ADLs: 
Bed Mobility: 
Rolling: Independent Supine to Sit: Independent Sit to Supine: Independent Scooting: Independent Transfers: 
Sit to Stand: Supervision Stand to Sit: Supervision Bathroom Mobility: Supervision/set up Toilet Transfer : Supervision ADL Assessment: 
Feeding: Independent Oral Facial Hygiene/Grooming: Supervision Bathing: Supervision; Additional time Upper Body Dressing: Setup Lower Body Dressing: Setup;Supervision Toileting: Supervision ADL Intervention and task modifications: 
Patient instructed and indicated understanding energy conservation techniques to increase independence and safety during all ADLs for end goal of returning back home.  Provided instruction body is like a jar of marbles, marbles represent energy, at end of day need as many marbles as possible to obtain a good night sleep, REM sleep is when the body repairs itself. This ensures a full jar of marbles, full of energy when wake up to start a new day. Use energy conservation techniques during ADLs so can increase participation in life activities patient prefers, to ensure more frequent good days. If having a bad day, evaluate tasks completed day before and re-plan how to save energy to complete same tasks, for example if going grocery shopping do not complete full bathing/dressing/grooming. Visual handout provided. Patient indicated understanding by stating tasks already completing to save energy ie sitting to don all clothing. Cognitive Retraining Safety/Judgement: Awareness of environment;Driving appropriateness; Fall prevention;Good awareness of safety precautions; Home safety; Insight into deficits Functional Measure: 
Barthel Index: 
 
Bathin Bladder: 10 Bowels: 10 
Groomin Dressin Feeding: 10 Mobility: 10 Stairs: 5 Toilet Use: 5 Transfer (Bed to Chair and Back): 10 Total: 70 Barthel and G-code impairment scale: 
Percentage of impairment CH 
0% CI 
1-19% CJ 
20-39% CK 
40-59% CL 
60-79% CM 
80-99% CN 
100% Barthel Score 0-100 100 99-80 79-60 59-40 20-39 1-19 
 0 Barthel Score 0-20 20 17-19 13-16 9-12 5-8 1-4 0 The Barthel ADL Index: Guidelines 1. The index should be used as a record of what a patient does, not as a record of what a patient could do. 2. The main aim is to establish degree of independence from any help, physical or verbal, however minor and for whatever reason. 3. The need for supervision renders the patient not independent. 4. A patient's performance should be established using the best available evidence. Asking the patient, friends/relatives and nurses are the usual sources, but direct observation and common sense are also important. However direct testing is not needed. 5. Usually the patient's performance over the preceding 24-48 hours is important, but occasionally longer periods will be relevant. 6. Middle categories imply that the patient supplies over 50 per cent of the effort. 7. Use of aids to be independent is allowed. Adrian Rand., Barthel, D.W. (0443). Functional evaluation: the Barthel Index. [address] [facility] (14)2. MITCHELL Torres, Elisabet Morris., Jorge Luis Voss., [place], [facility] (1999). Measuring the change indisability after inpatient rehabilitation; comparison of the responsiveness of the Barthel Index and Functional Unity Measure. Journal of Neurology, Neurosurgery, and Psychiatry, 66(4), 550-001. Valentino Teetee, N.J.A, LYLA Elliott, & Mary Truong M.A. (2004.) Assessment of post-stroke quality of life in cost-effectiveness studies: The usefulness of the Barthel Index and the EuroQoL-5D. [facility], 13, 456-16 G codes: In compliance with CMSs Claims Based Outcome Reporting, the following G-code set was chosen for this patient based on their primary functional limitation being treated: The outcome measure chosen to determine the severity of the functional limitation was the Barthel Index with a score of 70/100 which was correlated with the impairment scale. ? Self Care:  
  - CURRENT STATUS: CJ - 20%-39% impaired, limited or restricted  - GOAL STATUS: CJ - 20%-39% impaired, limited or restricted  - D/C STATUS:  CJ - 20%-39% impaired, limited or restricted Occupational Therapy Evaluation Charge Determination History Examination Decision-Making LOW Complexity : Brief history review  MEDIUM Complexity : 3-5 performance deficits relating to physical, cognitive , or psychosocial skils that result in activity limitations and / or participation restrictions MEDIUM Complexity : Patient may present with comorbidities that affect occupational performnce.  Miniml to moderate modification of tasks or assistance (eg, physical or verbal ) with assesment(s) is necessary to enable patient to complete evaluation Based on the above components, the patient evaluation is determined to be of the following complexity level: LOW Pain: 
Pain Scale 1: Numeric (0 - 10) Pain Intensity 1: 0 Activity Tolerance:  
Fair Please refer to the flowsheet for vital signs taken during this treatment. After treatment:  
[]  Patient left in no apparent distress sitting up in chair 
[x]  Patient left in no apparent distress in bed 
[x]  Call bell left within reach [x]  Nursing notified 
[x]  Caregiver present- pt's daughter 
[]  Bed alarm activated COMMUNICATION/EDUCATION:  
Communication/Collaboration: 
[x]      Home safety education was provided and the patient/caregiver indicated understanding. [x]      Patient/family have participated as able and agree with findings and recommendations. []      Patient is unable to participate in plan of care at this time. Findings and recommendations were discussed with: Registered Nurse Maria Fernanda Ambrose OT

## 2018-10-05 NOTE — PROGRESS NOTES
Problem: Mobility Impaired (Adult and Pediatric) Goal: *Acute Goals and Plan of Care (Insert Text) Physical Therapy Goals Initiated 10/5/2018 1. Patient will ambulate with independence for 200 feet with the least restrictive device and O2 sats 90% or better within 7 day(s). 2.  Patient will ascend/descend 2 stairs with 1 handrail(s) with modified independence within 7 day(s). physical Therapy EVALUATION Patient: Wing Oconnell (89 y.o. female) Date: 10/5/2018 Primary Diagnosis: Acute on chronic respiratory failure (HCC) Precautions: standard ASSESSMENT : 
Based on the objective data described below, the patient presents with mobility close to baseline. Patient able to ambulate 125 feet and negotiate 2 steps with supervision and assist with portable O2. O2 sats 94% at rest down to 87% post activity. Back up to 93% within 30 seconds of sitting. HR 70's to 80's with activity. Patient will not need therapy services at discharge. Will follow 1-2 visits to ensure safety. Patient will benefit from skilled intervention to address the above impairments. Patients rehabilitation potential is considered to be Good Factors which may influence rehabilitation potential include:  
[]         None noted 
[]         Mental ability/status [x]         Medical condition 
[]         Home/family situation and support systems 
[]         Safety awareness 
[]         Pain tolerance/management 
[]         Other: PLAN : 
Recommendations and Planned Interventions: 
[]           Bed Mobility Training             []    Neuromuscular Re-Education []           Transfer Training                   []    Orthotic/Prosthetic Training 
[x]           Gait Training                         []    Modalities []           Therapeutic Exercises           []    Edema Management/Control 
[]           Therapeutic Activities            []    Patient and Family Training/Education 
[]           Other (comment): 
 
 Frequency/Duration: Patient will be followed by physical therapy  2 times a week to address goals. Discharge Recommendations: None Further Equipment Recommendations for Discharge: SUBJECTIVE:  
Patient stated I can walk.  OBJECTIVE DATA SUMMARY:  
HISTORY:   
Past Medical History:  
Diagnosis Date  Arthritis  Autoimmune disease ([facility] [address] lupus  Cancer ([facility] [address] lung cancer  Gastrointestinal disorder GERD  Hypertension Past Surgical History:  
Procedure Laterality Date  HX CARPAL TUNNEL RELEASE  HX  SECTION    
 HX HYSTERECTOMY Prior Level of Function/Home Situation: Independent Personal factors and/or comorbidities impacting plan of care:  
 
Home Situation Home Environment: Private residence # Steps to Enter: 2 Rails to Enter: Yes One/Two Story Residence: Two story, live on 1st floor Living Alone: No 
Support Systems: Family member(s) Patient Expects to be Discharged to[de-identified] Private residence Current DME Used/Available at Home: Oxygen, portable EXAMINATION/PRESENTATION/DECISION MAKING:  
Critical Behavior: 
Neurologic State: Alert Orientation Level: Oriented X4 Cognition: Follows commands Range Of Motion: 
AROM: Within functional limits PROM: Within functional limits Strength:   
Strength: Generally decreased, functional 
   
Tone & Sensation:  
Tone: Normal 
    
Coordination: 
Coordination: Within functional limits Functional Mobility: 
Bed Mobility: 
Rolling: Independent Supine to Sit: Independent Sit to Supine: Independent Scooting: Independent Transfers: 
Sit to Stand: Supervision Stand to Sit: Supervision Balance:  
Sitting: Intact; Without support Standing: Intact; Without support Ambulation/Gait Training: 
Distance (ft): 125 Feet (ft) Assistive Device: Gait belt Ambulation - Level of Assistance: Supervision Gait Description (WDL): Exceptions to [facility] Gait Abnormalities: Decreased step clearance Stairs: Number of Stairs Trained: 2 Stairs - Level of Assistance: Supervision Rail Use: Right Functional Measure: 
Tinetti test: 
 
Sitting Balance: 1 Arises: 1 Attempts to Rise: 2 Immediate Standing Balance: 2 Standing Balance: 2 Nudged: 1 Eyes Closed: 1 Turn 360 Degrees - Continuous/Discontinuous: 1 Turn 360 Degrees - Steady/Unsteady: 1 Sitting Down: 1 Balance Score: 13 Indication of Gait: 1 
R Step Length/Height: 1 L Step Length/Height: 1 
R Foot Clearance: 1 L Foot Clearance: 1 Step Symmetry: 1 Step Continuity: 1 Path: 2 Trunk: 2 Walking Time: 1 Gait Score: 12 Total Score: 25 Tinetti Test and G-code impairment scale: 
Percentage of Impairment CH 
 
0% 
 CI 
 
1-19% CJ 
 
20-39% CK 
 
40-59% CL 
 
60-79% CM 
 
80-99% CN  
 
100% Tinetti Score 0-28 28 23-27 17-22 12-16 6-11 1-5 0 Tinetti Tool Score Risk of Falls 
<19 = High Fall Risk 19-24 = Moderate Fall Risk 25-28 = Low Fall Risk Tinetti ME. Performance-Oriented Assessment of Mobility Problems in Elderly Patients. [facility] 66; [id number]. (Scoring Description: PT Bulletin Feb. 10, 1993) Older adults: John Daniel et al, 2009; n = [address] elderly evaluated with ABC, MALACHI, ADL, and IADL) · Mean MALACHI score for males aged 69-68 years = 26.21(3.40) · Mean MALACHI score for females age 69-68 years = 25.16(4.30) · Mean MALACHI score for males over 80 years = 23.29(6.02) · Mean MALACHI score for females over 80 years = 17.20(8.32) G codes: In compliance with CMSs Claims Based Outcome Reporting, the following G-code set was chosen for this patient based on their primary functional limitation being treated: The outcome measure chosen to determine the severity of the functional limitation was the tinetti with a score of 25/28 which was correlated with the impairment scale. ? Mobility - Walking and Moving Around:  
  - CURRENT STATUS: CI - 1%-19% impaired, limited or restricted  - GOAL STATUS: CI - 1%-19% impaired, limited or restricted  - D/C STATUS:  CI - 1%-19% impaired, limited or restricted Pain: 
Pain Scale 1: Numeric (0 - 10) Pain Intensity 1: 0 Activity Tolerance:  
 
Please refer to the flowsheet for vital signs taken during this treatment. After treatment:  
[x]         Patient left in no apparent distress sitting up in chair 
[]         Patient left in no apparent distress in bed 
[x]         Call bell left within reach [x]         Nursing notified 
[]         Caregiver present 
[]         Bed alarm activated COMMUNICATION/EDUCATION:  
The patients plan of care was discussed with: Registered Nurse. [x]         Fall prevention education was provided and the patient/caregiver indicated understanding. []         Patient/family have participated as able in goal setting and plan of care. [x]         Patient/family agree to work toward stated goals and plan of care. []         Patient understands intent and goals of therapy, but is neutral about his/her participation. []         Patient is unable to participate in goal setting and plan of care. Thank you for this referral. 
Shilpi Baez, PT Time Calculation: 27 mins

## 2018-10-05 NOTE — PROGRESS NOTES
Spiritual Care Partner Volunteer visited patient in room 619/01 on 10.05.18. Documented by: : Rev. Naomi Rod. Cee Richards; [facility], to contact [phone] [address] call: [phone]

## 2018-10-06 PROCEDURE — 74011250637 HC RX REV CODE- 250/637: Performed by: HOSPITALIST

## 2018-10-06 PROCEDURE — 74011000250 HC RX REV CODE- 250: Performed by: HOSPITALIST

## 2018-10-06 PROCEDURE — 74011250636 HC RX REV CODE- 250/636: Performed by: HOSPITALIST

## 2018-10-06 PROCEDURE — 74011250636 HC RX REV CODE- 250/636: Performed by: INTERNAL MEDICINE

## 2018-10-06 PROCEDURE — 93306 TTE W/DOPPLER COMPLETE: CPT

## 2018-10-06 PROCEDURE — 65270000029 HC RM PRIVATE

## 2018-10-06 RX ADMIN — ONDANSETRON HYDROCHLORIDE 4 MG: 2 INJECTION INTRAMUSCULAR; INTRAVENOUS at 06:46

## 2018-10-06 RX ADMIN — HYDROMORPHONE HYDROCHLORIDE 2 MG: 2 TABLET ORAL at 14:11

## 2018-10-06 RX ADMIN — ONDANSETRON HYDROCHLORIDE 4 MG: 2 INJECTION INTRAMUSCULAR; INTRAVENOUS at 15:07

## 2018-10-06 RX ADMIN — PROCHLORPERAZINE MALEATE 5 MG: 5 TABLET, FILM COATED ORAL at 00:08

## 2018-10-06 RX ADMIN — CALCIUM CARBONATE-VITAMIN D TAB 500 MG-200 UNIT 1 TABLET: 500-200 TAB at 08:04

## 2018-10-06 RX ADMIN — PROCHLORPERAZINE MALEATE 5 MG: 5 TABLET, FILM COATED ORAL at 08:04

## 2018-10-06 RX ADMIN — OXYCODONE HYDROCHLORIDE 20 MG: 20 TABLET, FILM COATED, EXTENDED RELEASE ORAL at 06:44

## 2018-10-06 RX ADMIN — ACYCLOVIR 400 MG: 800 TABLET ORAL at 08:04

## 2018-10-06 RX ADMIN — Medication 10 ML: at 10:53

## 2018-10-06 RX ADMIN — PROCHLORPERAZINE MALEATE 5 MG: 5 TABLET, FILM COATED ORAL at 20:05

## 2018-10-06 RX ADMIN — ACYCLOVIR 400 MG: 800 TABLET ORAL at 17:08

## 2018-10-06 RX ADMIN — DEXAMETHASONE 8 MG: 4 TABLET ORAL at 20:05

## 2018-10-06 RX ADMIN — Medication 10 ML: at 22:52

## 2018-10-06 RX ADMIN — AMLODIPINE BESYLATE 10 MG: 5 TABLET ORAL at 08:04

## 2018-10-06 RX ADMIN — DEXAMETHASONE 8 MG: 4 TABLET ORAL at 08:04

## 2018-10-06 RX ADMIN — SENNOSIDES AND DOCUSATE SODIUM 2 TABLET: 8.6; 5 TABLET ORAL at 08:04

## 2018-10-06 RX ADMIN — ASPIRIN 81 MG: 81 TABLET, COATED ORAL at 08:04

## 2018-10-06 RX ADMIN — Medication 10 ML: at 06:00

## 2018-10-06 RX ADMIN — POLYETHYLENE GLYCOL 3350 17 G: 17 POWDER, FOR SOLUTION ORAL at 17:08

## 2018-10-06 RX ADMIN — POLYETHYLENE GLYCOL 3350 17 G: 17 POWDER, FOR SOLUTION ORAL at 08:05

## 2018-10-06 RX ADMIN — FUROSEMIDE 40 MG: 10 INJECTION, SOLUTION INTRAMUSCULAR; INTRAVENOUS at 08:04

## 2018-10-06 RX ADMIN — ENOXAPARIN SODIUM 40 MG: 100 INJECTION SUBCUTANEOUS at 17:08

## 2018-10-06 RX ADMIN — ONDANSETRON HYDROCHLORIDE 4 MG: 2 INJECTION INTRAMUSCULAR; INTRAVENOUS at 22:51

## 2018-10-06 RX ADMIN — ONDANSETRON HYDROCHLORIDE 4 MG: 2 INJECTION INTRAMUSCULAR; INTRAVENOUS at 19:05

## 2018-10-06 RX ADMIN — OXYCODONE HYDROCHLORIDE 20 MG: 20 TABLET, FILM COATED, EXTENDED RELEASE ORAL at 18:13

## 2018-10-06 RX ADMIN — FOLIC ACID 1 MG: 1 TABLET ORAL at 09:00

## 2018-10-06 RX ADMIN — HYDROMORPHONE HYDROCHLORIDE 2 MG: 2 TABLET ORAL at 10:52

## 2018-10-06 RX ADMIN — CALCIUM CARBONATE-VITAMIN D TAB 500 MG-200 UNIT 1 TABLET: 500-200 TAB at 18:00

## 2018-10-06 RX ADMIN — PROCHLORPERAZINE MALEATE 5 MG: 5 TABLET, FILM COATED ORAL at 15:07

## 2018-10-06 NOTE — PROGRESS NOTES
Hospitalist Progress Note Nikki Winslow MD 
     
                             Answering service: [phone] OR [phone] from in house phone Date of Service:  10/6/2018 NAME:  Giorgio Haji :  1955 MRN:  [id number] Admission Summary: This is a 39-year-old -American female with history of stage IV lung cancer with liver mass presented with dyspnea and increased oxygen requirement. She normally uses oxygen via nasal canula 1 l/min. Reason for follow up:  
She says her breathing is better. She says Steroids have helped. No new complaints. STEW pending Assessment & Plan:  
Acute on chronic respiratory failure could be due to the progression of her lung cancer. Minimal pleural effusion. Last admission she had work up for the hypoxia. PFT indicated restrictive pattern,pulmonary had evaluated. She was then discharged on home oxygen. -CTA lung is negative for PE. There are multiple lesions , several subsegmental bronchi obstructed by the tumor. A large liver mass is present 
-Undergoing chemotherapy,today was cycle 3. 
-Lasix seems to help,she as prn order at home 
-Oncology consulted. Gastroesophageal reflux disease. Continue PPI. Normocytic anemia likely of chronic disease,cancer,lupus: stable. Monitor Lupus stable. Diet:cardiac Code status: full DVT prophylaxis: scd Care Plan discussed with: patient,daughter Hospital Problems  Date Reviewed: 2018 Codes Class Noted POA * (Principal)Acute on chronic respiratory failure (HCC) ICD-10-CM: J96.20 ICD-9-CM: 518.84  10/4/2018 Yes Review of Systems: A comprehensive review of systems was negative except for that written in the HPI. Physical Examination:  
 
 Last 24hrs VS reviewed since prior progress note. Most recent are: Visit Vitals  /71 (BP 1 Location: Left arm, BP Patient Position: At rest)  Pulse 62  Temp 97.6 °F (36.4 °C)  Resp 16  
 Ht 5' 2.5\" (1.588 m)  Wt 64.9 kg (143 lb)  SpO2 95%  BMI 25.74 kg/m2 Constitutional:  No acute distress, cooperative, pleasant   
HEENT: Head is a traumatic, Un icteric sclera. Pink conjunctiva,no erythema or discharge. Oral mucous moist, oropharynx benign. Neck supple, Resp:  CTA bilaterally. Rhonchi on the RLL posteriorly. CV:  Regular rhythm, normal rate, no murmurs, gallops, rubs GI:  Soft, non distended, non tender. normoactive bowel sounds, no hepatosplenomegaly :  No CVA or suprapubic tenderness Skin  :  No erythema,rash,bullae,dipigmentation Musculoskeletal:  Pitting bilateral leg edema; warm, 2+ pulses throughout Neurologic:  AAOx3, CN II-XII reviewed. Moves all extremities. Psych:  Good insight, Not anxious nor agitated. No intake or output data in the 24 hours ending 10/06/18 3300 Data Review:  
 Review and/or order of clinical lab test 
Review and/or order of tests in the radiology section of CPT Review and/or order of tests in the medicine section of CPT Labs:  
 
Recent Labs 10/05/18 
 0430  10/04/18 
 1108 WBC  7.3  8.6 HGB  8.5*  8.8* HCT  26.7*  28.6*  
PLT  504*  586* Recent Labs 10/05/18 
 0430  10/04/18 
 1108 NA  137  135* K  4.3  3.9 CL  106  103 CO2  23  23 BUN  16  16 CREA  0.94  1.11* GLU  143*  164* CA  8.2*  8.0*  
MG  2.3  2.2 PHOS  3.5   --   
 
Recent Labs 10/05/18 
 0430 SGOT  52* ALT  32 AP  122* TBILI  0.3 TP  7.2 ALB  1.9*  
GLOB  5.3* No results for input(s): INR, PTP, APTT in the last 72 hours. No lab exists for component: INREXT, INREXT No results for input(s): FE, TIBC, PSAT, FERR in the last 72 hours. No results found for: FOL, RBCF No results for input(s): PH, PCO2, PO2 in the last 72 hours. Recent Labs 10/04/18 
 1108 TROIQ  <0.05 No results found for: CHOL, CHOLX, CHLST, CHOLV, HDL, LDL, LDLC, DLDLP, TGLX, TRIGL, TRIGP, CHHD, CHHDX Lab Results Component Value Date/Time Glucose (POC) 119 (H) 08/31/2018 11:10 AM  
 Glucose (POC) 94 08/31/2018 06:40 AM  
 Glucose (POC) 136 (H) 08/30/2018 10:12 PM  
 Glucose (POC) 114 (H) 08/30/2018 04:15 PM  
 Glucose (POC) 133 (H) 08/30/2018 11:06 AM  
 
Lab Results Component Value Date/Time Color YELLOW/STRAW 10/04/2018 01:19 PM  
 Appearance CLEAR 10/04/2018 01:19 PM  
 Specific gravity 1.019 10/04/2018 01:19 PM  
 pH (UA) 6.5 10/04/2018 01:19 PM  
 Protein NEGATIVE  10/04/2018 01:19 PM  
 Glucose NEGATIVE  10/04/2018 01:19 PM  
 Ketone NEGATIVE  10/04/2018 01:19 PM  
 Bilirubin NEGATIVE  10/04/2018 01:19 PM  
 Urobilinogen 0.2 10/04/2018 01:19 PM  
 Nitrites NEGATIVE  10/04/2018 01:19 PM  
 Leukocyte Esterase NEGATIVE  10/04/2018 01:19 PM  
 Epithelial cells FEW 10/04/2018 01:19 PM  
 Bacteria NEGATIVE  10/04/2018 01:19 PM  
 WBC 0-4 10/04/2018 01:19 PM  
 RBC 0-5 10/04/2018 01:19 PM  
 
 
 
Medications Reviewed:  
 
Current Facility-Administered Medications Medication Dose Route Frequency  polyethylene glycol (MIRALAX) packet 17 g  17 g Oral BID  senna-docusate (PERICOLACE) 8.6-50 mg per tablet 2 Tab  2 Tab Oral DAILY  dexamethasone (DECADRON) tablet 8 mg  8 mg Oral Q12H  prochlorperazine (COMPAZINE) tablet 5 mg  5 mg Oral Q6H PRN  
 amLODIPine (NORVASC) tablet 10 mg  10 mg Oral DAILY  acyclovir (ZOVIRAX) tablet 400 mg  400 mg Oral BID  aspirin delayed-release tablet 81 mg  81 mg Oral DAILY  calcium-vitamin D (OS-KENIA) 500 mg-200 unit tablet  1 Tab Oral BID  folic acid (FOLVITE) tablet 1 mg  1 mg Oral DAILY  HYDROmorphone (DILAUDID) tablet 2 mg  2 mg Oral Q4H PRN  
 LORazepam (ATIVAN) tablet 2 mg  2 mg Oral Q8H PRN  mirtazapine (REMERON SOL-TAB) disintegrating tablet 15 mg  15 mg Oral QHS  oxyCODONE ER (OxyCONTIN) tablet 20 mg  20 mg Oral Q12H  furosemide (LASIX) injection 40 mg  40 mg IntraVENous DAILY  sodium chloride (NS) flush 5-10 mL  5-10 mL IntraVENous Q8H  
 sodium chloride (NS) flush 5-10 mL  5-10 mL IntraVENous PRN  
 naloxone (NARCAN) injection 0.4 mg  0.4 mg IntraVENous PRN  
 enoxaparin (LOVENOX) injection 40 mg  40 mg SubCUTAneous Q24H  
 acetaminophen (TYLENOL) tablet 650 mg  650 mg Oral Q4H PRN  
 ondansetron (ZOFRAN) injection 4 mg  4 mg IntraVENous Q4H PRN  
 
______________________________________________________________________ EXPECTED LENGTH OF STAY: 3d 19h ACTUAL LENGTH OF STAY:          [facility] RYANN Sauceda MD

## 2018-10-06 NOTE — PROGRESS NOTES
Bedside and Verbal shift change report given to Yonas Anderson and Conchis Crowe RN (oncoming nurse) by Toni Gomez RN (offgoing nurse). Report included the following information SBAR, Kardex and MAR.

## 2018-10-06 NOTE — PROGRESS NOTES
-Hematology / Oncology ([facility]) - 
-Primary Oncologist- Dr Davy Olivas 
-CC- lung cancer 
 
-S-  Feeling better, no new issues, no F/c, no productive cough 
 
-O-  
 Patient Vitals for the past 24 hrs: 
 Temp Pulse Resp BP SpO2  
10/06/18 0827 98.1 °F (36.7 °C) 61 16 147/79 94 % 10/06/18 0110 97.6 °F (36.4 °C) 62 16 143/71 95 % 10/05/18 1453 97.8 °F (36.6 °C) 65 16 126/78 98 % Gen: nad Chest: bilateral breath sounds present Cardiac: rrr Abd: s/nt 
 
-Labs-   
Recent Labs 10/05/18 
 0430  10/04/18 
 1108 WBC  7.3  8.6 HGB  8.5*  8.8* PLT  504*  586* ANEU  5.7  6.7 NA  137  135* K  4.3  3.9 GLU  143*  164* BUN  16  16 CREA  0.94  1.11* ALT  32   --   
SGOT  52*   --   
TBILI  0.3   --   
AP  122*   --   
CA  8.2*  8.0*  
MG  2.3  2.2 PHOS  3.5   --   
 
 
-Imaging-  
 
 
-Assessment + Plan-    
*) NSCLC  
-- (EGFR mutated, S/p gefitinib and tagrisso, progression-> alimta/avastin s/p C2 9/14/18) -- chemo on hold *)  Dyspnea No clear evidence of pneumonia No PE 
                     Decadron increased 10/5 to see if that improves symptoms 10/6 feeling better 
  
*)  Pericardial effusion TTE to eval for hemodynamic compromise- pending 
  
*)  Anemia Monitor. Stable 
  
*)  DVT proph with lovenox Following. ..

## 2018-10-06 NOTE — PROGRESS NOTES
Problem: Falls - Risk of 
Goal: *Absence of Falls Document Delroy Contreras Fall Risk and appropriate interventions in the flowsheet. Outcome: Progressing Towards Goal 
Fall Risk Interventions: 
  
 
  
 
Medication Interventions: Patient to call before getting OOB Problem: Pressure Injury - Risk of 
Goal: *Prevention of pressure injury Document Arthur Scale and appropriate interventions in the flowsheet. Offload heels Turn approximately every 2 hours Outcome: Progressing Towards Goal 
Pressure Injury Interventions: Activity Interventions: Increase time out of bed Mobility Interventions: Pressure redistribution bed/mattress (bed type), Float heels Nutrition Interventions: Document food/fluid/supplement intake Friction and Shear Interventions: Minimize layers, Lift sheet, HOB 30 degrees or less

## 2018-10-07 LAB
ANION GAP SERPL CALC-SCNC: 10 MMOL/L (ref 5–15)
BUN SERPL-MCNC: 19 MG/DL (ref 6–20)
BUN/CREAT SERPL: 20 (ref 12–20)
CALCIUM SERPL-MCNC: 8.1 MG/DL (ref 8.5–10.1)
CHLORIDE SERPL-SCNC: 100 MMOL/L (ref 97–108)
CO2 SERPL-SCNC: 24 MMOL/L (ref 21–32)
CREAT SERPL-MCNC: 0.93 MG/DL (ref 0.55–1.02)
ERYTHROCYTE [DISTWIDTH] IN BLOOD BY AUTOMATED COUNT: 26.4 % (ref 11.5–14.5)
GLUCOSE SERPL-MCNC: 139 MG/DL (ref 65–100)
HCT VFR BLD AUTO: 28.2 % (ref 35–47)
HGB BLD-MCNC: 8.9 G/DL (ref 11.5–16)
MCH RBC QN AUTO: 27.5 PG (ref 26–34)
MCHC RBC AUTO-ENTMCNC: 31.6 G/DL (ref 30–36.5)
MCV RBC AUTO: 87 FL (ref 80–99)
NRBC # BLD: 0.1 K/UL (ref 0–0.01)
NRBC BLD-RTO: 0.7 PER 100 WBC
PLATELET # BLD AUTO: 506 K/UL (ref 150–400)
PMV BLD AUTO: 10.2 FL (ref 8.9–12.9)
POTASSIUM SERPL-SCNC: 4.2 MMOL/L (ref 3.5–5.1)
RBC # BLD AUTO: 3.24 M/UL (ref 3.8–5.2)
SODIUM SERPL-SCNC: 134 MMOL/L (ref 136–145)
WBC # BLD AUTO: 14.1 K/UL (ref 3.6–11)

## 2018-10-07 PROCEDURE — 85027 COMPLETE CBC AUTOMATED: CPT | Performed by: HOSPITALIST

## 2018-10-07 PROCEDURE — 74011250637 HC RX REV CODE- 250/637: Performed by: HOSPITALIST

## 2018-10-07 PROCEDURE — 36415 COLL VENOUS BLD VENIPUNCTURE: CPT | Performed by: HOSPITALIST

## 2018-10-07 PROCEDURE — 74011250636 HC RX REV CODE- 250/636: Performed by: HOSPITALIST

## 2018-10-07 PROCEDURE — 74011000250 HC RX REV CODE- 250: Performed by: HOSPITALIST

## 2018-10-07 PROCEDURE — 74011250636 HC RX REV CODE- 250/636: Performed by: INTERNAL MEDICINE

## 2018-10-07 PROCEDURE — 65270000029 HC RM PRIVATE

## 2018-10-07 PROCEDURE — 80048 BASIC METABOLIC PNL TOTAL CA: CPT | Performed by: HOSPITALIST

## 2018-10-07 RX ORDER — DOCUSATE SODIUM 100 MG/1
100 CAPSULE, LIQUID FILLED ORAL 2 TIMES DAILY
Status: DISCONTINUED | OUTPATIENT
Start: 2018-10-07 | End: 2018-10-11 | Stop reason: HOSPADM

## 2018-10-07 RX ADMIN — Medication 10 ML: at 08:45

## 2018-10-07 RX ADMIN — FUROSEMIDE 40 MG: 10 INJECTION, SOLUTION INTRAMUSCULAR; INTRAVENOUS at 08:52

## 2018-10-07 RX ADMIN — ENOXAPARIN SODIUM 40 MG: 100 INJECTION SUBCUTANEOUS at 17:25

## 2018-10-07 RX ADMIN — FOLIC ACID 1 MG: 1 TABLET ORAL at 08:49

## 2018-10-07 RX ADMIN — AMLODIPINE BESYLATE 10 MG: 5 TABLET ORAL at 08:50

## 2018-10-07 RX ADMIN — CALCIUM CARBONATE-VITAMIN D TAB 500 MG-200 UNIT 1 TABLET: 500-200 TAB at 08:49

## 2018-10-07 RX ADMIN — HYDROMORPHONE HYDROCHLORIDE 2 MG: 2 TABLET ORAL at 09:05

## 2018-10-07 RX ADMIN — DEXAMETHASONE 8 MG: 4 TABLET ORAL at 20:31

## 2018-10-07 RX ADMIN — ONDANSETRON HYDROCHLORIDE 4 MG: 2 INJECTION INTRAMUSCULAR; INTRAVENOUS at 17:25

## 2018-10-07 RX ADMIN — DOCUSATE SODIUM 100 MG: 100 CAPSULE, LIQUID FILLED ORAL at 09:05

## 2018-10-07 RX ADMIN — ONDANSETRON HYDROCHLORIDE 4 MG: 2 INJECTION INTRAMUSCULAR; INTRAVENOUS at 13:28

## 2018-10-07 RX ADMIN — OXYCODONE HYDROCHLORIDE 20 MG: 20 TABLET, FILM COATED, EXTENDED RELEASE ORAL at 07:14

## 2018-10-07 RX ADMIN — PROCHLORPERAZINE MALEATE 5 MG: 5 TABLET, FILM COATED ORAL at 18:30

## 2018-10-07 RX ADMIN — DEXAMETHASONE 8 MG: 4 TABLET ORAL at 08:49

## 2018-10-07 RX ADMIN — PROCHLORPERAZINE MALEATE 5 MG: 5 TABLET, FILM COATED ORAL at 06:29

## 2018-10-07 RX ADMIN — POLYETHYLENE GLYCOL 3350 17 G: 17 POWDER, FOR SOLUTION ORAL at 17:25

## 2018-10-07 RX ADMIN — ONDANSETRON HYDROCHLORIDE 4 MG: 2 INJECTION INTRAMUSCULAR; INTRAVENOUS at 08:44

## 2018-10-07 RX ADMIN — POLYETHYLENE GLYCOL 3350 17 G: 17 POWDER, FOR SOLUTION ORAL at 08:50

## 2018-10-07 RX ADMIN — LACTULOSE 20 G: 20 SOLUTION ORAL at 18:30

## 2018-10-07 RX ADMIN — ONDANSETRON HYDROCHLORIDE 4 MG: 2 INJECTION INTRAMUSCULAR; INTRAVENOUS at 21:34

## 2018-10-07 RX ADMIN — PROCHLORPERAZINE MALEATE 5 MG: 5 TABLET, FILM COATED ORAL at 12:42

## 2018-10-07 RX ADMIN — CALCIUM CARBONATE-VITAMIN D TAB 500 MG-200 UNIT 1 TABLET: 500-200 TAB at 17:25

## 2018-10-07 RX ADMIN — OXYCODONE HYDROCHLORIDE 20 MG: 20 TABLET, FILM COATED, EXTENDED RELEASE ORAL at 18:31

## 2018-10-07 RX ADMIN — ACYCLOVIR 400 MG: 800 TABLET ORAL at 17:24

## 2018-10-07 RX ADMIN — HYDROMORPHONE HYDROCHLORIDE 2 MG: 2 TABLET ORAL at 17:24

## 2018-10-07 RX ADMIN — Medication 10 ML: at 21:34

## 2018-10-07 RX ADMIN — SENNOSIDES AND DOCUSATE SODIUM 2 TABLET: 8.6; 5 TABLET ORAL at 08:49

## 2018-10-07 RX ADMIN — Medication 10 ML: at 05:00

## 2018-10-07 RX ADMIN — HYDROMORPHONE HYDROCHLORIDE 2 MG: 2 TABLET ORAL at 23:27

## 2018-10-07 RX ADMIN — Medication 10 ML: at 13:28

## 2018-10-07 RX ADMIN — DOCUSATE SODIUM 100 MG: 100 CAPSULE, LIQUID FILLED ORAL at 17:24

## 2018-10-07 RX ADMIN — PROCHLORPERAZINE MALEATE 5 MG: 5 TABLET, FILM COATED ORAL at 00:08

## 2018-10-07 RX ADMIN — ASPIRIN 81 MG: 81 TABLET, COATED ORAL at 08:51

## 2018-10-07 RX ADMIN — ACYCLOVIR 400 MG: 800 TABLET ORAL at 08:51

## 2018-10-07 RX ADMIN — ONDANSETRON HYDROCHLORIDE 4 MG: 2 INJECTION INTRAMUSCULAR; INTRAVENOUS at 04:21

## 2018-10-07 NOTE — PROGRESS NOTES
-Hematology / Oncology ([facility]) - 
-Primary Oncologist- Dr Christensen [place] feeling better, no new issues, no F/c 
 
-O-  
  
Patient Vitals for the past 24 hrs: 
 Temp Pulse Resp BP SpO2  
10/07/18 0226 98 °F (36.7 °C) 67 18 160/82 96 % 10/06/18 2034 98 °F (36.7 °C) 75 18 137/77 98 % 10/06/18 1433 97.7 °F (36.5 °C) 61 16 136/82 98 % 10/06/18 0827 98.1 °F (36.7 °C) 61 16 147/79 94 % Gen: nad Chest: bilateral breath sounds present Cardiac: rrr Abd: s/nt 
 
-Labs-   
Recent Labs 10/07/18 
 0435  10/05/18 
 0430  10/04/18 
 1108 WBC  14.1*  7.3  8.6 HGB  8.9*  8.5*  8.8* PLT  506*  504*  586* ANEU   --   5.7  6.7 NA  134*  137  135* K  4.2  4.3  3.9 GLU  139*  143*  164* BUN  19  16  16 CREA  0.93  0.94  1.11* ALT   --   32   --   
SGOT   --   52*   --   
TBILI   --   0.3   --   
AP   --   122*   --   
CA  8.1*  8.2*  8.0*  
MG   --   2.3  2.2 PHOS   --   3.5   --   
 
 
-Imaging-  
 
 
-Assessment + Plan-    
*) NSCLC  
-- (EGFR mutated, S/p gefitinib and tagrisso, progression-> alimta/avastin s/p C2 9/14/18) -- chemo on hold *)  Dyspnea No clear evidence of pneumonia No PE 
                     Decadron increased 10/5 to see if that improves symptoms 10/6,7 feeling better 
  
*)  Pericardial effusion TTE to eval for hemodynamic compromise- pending 
  
*)  Anemia Monitor. Stable 
  
*)  DVT proph with lovenox Following. ..

## 2018-10-07 NOTE — PROGRESS NOTES
Hospitalist Progress Note Óscar Busby MD 
     
                             Answering service: [phone] OR [phone] from in house phone Date of Service:  10/7/2018 NAME:  Billy Mcguire :  1955 MRN:  [id number] Admission Summary: This is a 68-year-old -American female with history of stage IV lung cancer with liver mass presented with dyspnea and increased oxygen requirement. She normally uses oxygen via nasal canula 1 l/min. Reason for follow up:  
She says her breathing is better. She says Steroids have helped. No new complaints. STEW pending 10/7 Breathing is better. On Steroids probably can taper soon. STEW unremarkable. Leukocytosis probably due to Steroids. Assessment & Plan:  
Acute on chronic respiratory failure could be due to the progression of her lung cancer. Minimal pleural effusion. Last admission she had work up for the hypoxia. PFT indicated restrictive pattern,pulmonary had evaluated. She was then discharged on home oxygen. -CTA lung is negative for PE. There are multiple lesions , several subsegmental bronchi obstructed by the tumor. A large liver mass is present 
-Undergoing chemotherapy,today was cycle 3. 
-Lasix seems to help,she as prn order at home 
-Oncology consulted. Gastroesophageal reflux disease. Continue PPI. Normocytic anemia likely of chronic disease,cancer,lupus: stable. Monitor Lupus stable. Diet:cardiac Code status: full DVT prophylaxis: scd Care Plan discussed with: patient,daughter Hospital Problems  Date Reviewed: 2018 Codes Class Noted POA * (Principal)Acute on chronic respiratory failure (HCC) ICD-10-CM: J96.20 ICD-9-CM: 518.84  10/4/2018 Yes Review of Systems: A comprehensive review of systems was negative except for that written in the HPI. Physical Examination:  
 
 Last 24hrs VS reviewed since prior progress note. Most recent are: 
Visit Vitals  /82 (BP 1 Location: Left arm, BP Patient Position: At rest)  Pulse 67  Temp 98 °F (36.7 °C)  Resp 18  Ht 5' 2.5\" (1.588 m)  Wt 64.7 kg (142 lb 9.6 oz)  SpO2 96%  BMI 25.67 kg/m2 Constitutional:  No acute distress, cooperative, pleasant   
HEENT: Head is a traumatic, Un icteric sclera. Pink conjunctiva,no erythema or discharge. Oral mucous moist, oropharynx benign. Neck supple, Resp:  CTA bilaterally. Rhonchi on the RLL posteriorly. CV:  Regular rhythm, normal rate, no murmurs, gallops, rubs GI:  Soft, non distended, non tender. normoactive bowel sounds, no hepatosplenomegaly :  No CVA or suprapubic tenderness Skin  :  No erythema,rash,bullae,dipigmentation Musculoskeletal:  Pitting bilateral leg edema; warm, 2+ pulses throughout Neurologic:  AAOx3, CN II-XII reviewed. Moves all extremities. Psych:  Good insight, Not anxious nor agitated. No intake or output data in the 24 hours ending 10/07/18 6021 Data Review:  
 Review and/or order of clinical lab test 
Review and/or order of tests in the radiology section of CPT Review and/or order of tests in the medicine section of CPT Labs:  
 
Recent Labs 10/07/18 
 0435  10/05/18 
 0430 WBC  14.1*  7.3 HGB  8.9*  8.5* HCT  28.2*  26.7*  
PLT  506*  504* Recent Labs 10/07/18 
 0435  10/05/18 
 0430  10/04/18 
 1108 NA  134*  137  135* K  4.2  4.3  3.9 CL  100  106  103 CO2  24  23  23 BUN  19  16  16 CREA  0.93  0.94  1.11* GLU  139*  143*  164* CA  8.1*  8.2*  8.0*  
MG   --   2.3  2.2 PHOS   --   3.5   --   
 
Recent Labs 10/05/18 
 0430 SGOT  52* ALT  32 AP  122* TBILI  0.3 TP  7.2 ALB  1.9*  
GLOB  5.3*  
 
 No results for input(s): INR, PTP, APTT in the last 72 hours. No lab exists for component: INREXT, INREXT No results for input(s): FE, TIBC, PSAT, FERR in the last 72 hours. No results found for: FOL, RBCF No results for input(s): PH, PCO2, PO2 in the last 72 hours. Recent Labs 10/04/18 
 1108 TROIQ  <0.05 No results found for: CHOL, CHOLX, CHLST, CHOLV, HDL, LDL, LDLC, DLDLP, TGLX, TRIGL, TRIGP, CHHD, CHHDX Lab Results Component Value Date/Time Glucose (POC) 119 (H) 08/31/2018 11:10 AM  
 Glucose (POC) 94 08/31/2018 06:40 AM  
 Glucose (POC) 136 (H) 08/30/2018 10:12 PM  
 Glucose (POC) 114 (H) 08/30/2018 04:15 PM  
 Glucose (POC) 133 (H) 08/30/2018 11:06 AM  
 
Lab Results Component Value Date/Time Color YELLOW/STRAW 10/04/2018 01:19 PM  
 Appearance CLEAR 10/04/2018 01:19 PM  
 Specific gravity 1.019 10/04/2018 01:19 PM  
 pH (UA) 6.5 10/04/2018 01:19 PM  
 Protein NEGATIVE  10/04/2018 01:19 PM  
 Glucose NEGATIVE  10/04/2018 01:19 PM  
 Ketone NEGATIVE  10/04/2018 01:19 PM  
 Bilirubin NEGATIVE  10/04/2018 01:19 PM  
 Urobilinogen 0.2 10/04/2018 01:19 PM  
 Nitrites NEGATIVE  10/04/2018 01:19 PM  
 Leukocyte Esterase NEGATIVE  10/04/2018 01:19 PM  
 Epithelial cells FEW 10/04/2018 01:19 PM  
 Bacteria NEGATIVE  10/04/2018 01:19 PM  
 WBC 0-4 10/04/2018 01:19 PM  
 RBC 0-5 10/04/2018 01:19 PM  
 
 
 
Medications Reviewed:  
 
Current Facility-Administered Medications Medication Dose Route Frequency  polyethylene glycol (MIRALAX) packet 17 g  17 g Oral BID  senna-docusate (PERICOLACE) 8.6-50 mg per tablet 2 Tab  2 Tab Oral DAILY  dexamethasone (DECADRON) tablet 8 mg  8 mg Oral Q12H  prochlorperazine (COMPAZINE) tablet 5 mg  5 mg Oral Q6H PRN  
 amLODIPine (NORVASC) tablet 10 mg  10 mg Oral DAILY  acyclovir (ZOVIRAX) tablet 400 mg  400 mg Oral BID  aspirin delayed-release tablet 81 mg  81 mg Oral DAILY  calcium-vitamin D (OS-KENIA) 500 mg-200 unit tablet  1 Tab Oral BID  folic acid (FOLVITE) tablet 1 mg  1 mg Oral DAILY  HYDROmorphone (DILAUDID) tablet 2 mg  2 mg Oral Q4H PRN  
 LORazepam (ATIVAN) tablet 2 mg  2 mg Oral Q8H PRN  mirtazapine (REMERON SOL-TAB) disintegrating tablet 15 mg  15 mg Oral QHS  oxyCODONE ER (OxyCONTIN) tablet 20 mg  20 mg Oral Q12H  furosemide (LASIX) injection 40 mg  40 mg IntraVENous DAILY  sodium chloride (NS) flush 5-10 mL  5-10 mL IntraVENous Q8H  
 sodium chloride (NS) flush 5-10 mL  5-10 mL IntraVENous PRN  
 naloxone (NARCAN) injection 0.4 mg  0.4 mg IntraVENous PRN  
 enoxaparin (LOVENOX) injection 40 mg  40 mg SubCUTAneous Q24H  
 acetaminophen (TYLENOL) tablet 650 mg  650 mg Oral Q4H PRN  
 ondansetron (ZOFRAN) injection 4 mg  4 mg IntraVENous Q4H PRN  
 
______________________________________________________________________ EXPECTED LENGTH OF STAY: 3d 19h ACTUAL LENGTH OF STAY:          3 Nikki Winslow MD

## 2018-10-08 LAB
ANION GAP SERPL CALC-SCNC: 9 MMOL/L (ref 5–15)
BUN SERPL-MCNC: 16 MG/DL (ref 6–20)
BUN/CREAT SERPL: 17 (ref 12–20)
CALCIUM SERPL-MCNC: 8.7 MG/DL (ref 8.5–10.1)
CHLORIDE SERPL-SCNC: 98 MMOL/L (ref 97–108)
CO2 SERPL-SCNC: 27 MMOL/L (ref 21–32)
CREAT SERPL-MCNC: 0.96 MG/DL (ref 0.55–1.02)
ERYTHROCYTE [DISTWIDTH] IN BLOOD BY AUTOMATED COUNT: 25.1 % (ref 11.5–14.5)
GLUCOSE SERPL-MCNC: 134 MG/DL (ref 65–100)
HCT VFR BLD AUTO: 31.8 % (ref 35–47)
HGB BLD-MCNC: 9.8 G/DL (ref 11.5–16)
MCH RBC QN AUTO: 26.1 PG (ref 26–34)
MCHC RBC AUTO-ENTMCNC: 30.8 G/DL (ref 30–36.5)
MCV RBC AUTO: 84.6 FL (ref 80–99)
NRBC # BLD: 0.12 K/UL (ref 0–0.01)
NRBC BLD-RTO: 0.7 PER 100 WBC
PLATELET # BLD AUTO: 580 K/UL (ref 150–400)
PMV BLD AUTO: 9 FL (ref 8.9–12.9)
POTASSIUM SERPL-SCNC: 4.2 MMOL/L (ref 3.5–5.1)
RBC # BLD AUTO: 3.76 M/UL (ref 3.8–5.2)
SODIUM SERPL-SCNC: 134 MMOL/L (ref 136–145)
WBC # BLD AUTO: 17 K/UL (ref 3.6–11)

## 2018-10-08 PROCEDURE — 36415 COLL VENOUS BLD VENIPUNCTURE: CPT | Performed by: HOSPITALIST

## 2018-10-08 PROCEDURE — 74011250636 HC RX REV CODE- 250/636: Performed by: INTERNAL MEDICINE

## 2018-10-08 PROCEDURE — 74011250636 HC RX REV CODE- 250/636: Performed by: HOSPITALIST

## 2018-10-08 PROCEDURE — 80048 BASIC METABOLIC PNL TOTAL CA: CPT | Performed by: HOSPITALIST

## 2018-10-08 PROCEDURE — 65270000029 HC RM PRIVATE

## 2018-10-08 PROCEDURE — 85027 COMPLETE CBC AUTOMATED: CPT | Performed by: HOSPITALIST

## 2018-10-08 PROCEDURE — 74011250637 HC RX REV CODE- 250/637: Performed by: HOSPITALIST

## 2018-10-08 PROCEDURE — 97116 GAIT TRAINING THERAPY: CPT

## 2018-10-08 PROCEDURE — 74011000250 HC RX REV CODE- 250: Performed by: HOSPITALIST

## 2018-10-08 PROCEDURE — 74011000250 HC RX REV CODE- 250: Performed by: INTERNAL MEDICINE

## 2018-10-08 RX ORDER — LIDOCAINE 50 MG/G
1 PATCH TOPICAL EVERY 24 HOURS
Status: DISCONTINUED | OUTPATIENT
Start: 2018-10-08 | End: 2018-10-11 | Stop reason: HOSPADM

## 2018-10-08 RX ORDER — DEXTROMETHORPHAN POLISTIREX 30 MG/5 ML
SUSPENSION, EXTENDED RELEASE 12 HR ORAL AS NEEDED
Status: DISCONTINUED | OUTPATIENT
Start: 2018-10-08 | End: 2018-10-11 | Stop reason: HOSPADM

## 2018-10-08 RX ADMIN — CALCIUM CARBONATE-VITAMIN D TAB 500 MG-200 UNIT 1 TABLET: 500-200 TAB at 08:36

## 2018-10-08 RX ADMIN — DEXAMETHASONE 8 MG: 4 TABLET ORAL at 08:36

## 2018-10-08 RX ADMIN — ONDANSETRON HYDROCHLORIDE 4 MG: 2 INJECTION INTRAMUSCULAR; INTRAVENOUS at 21:14

## 2018-10-08 RX ADMIN — ONDANSETRON HYDROCHLORIDE 4 MG: 2 INJECTION INTRAMUSCULAR; INTRAVENOUS at 01:30

## 2018-10-08 RX ADMIN — PROCHLORPERAZINE MALEATE 5 MG: 5 TABLET, FILM COATED ORAL at 12:19

## 2018-10-08 RX ADMIN — HYDROMORPHONE HYDROCHLORIDE 2 MG: 2 TABLET ORAL at 21:20

## 2018-10-08 RX ADMIN — ACYCLOVIR 400 MG: 800 TABLET ORAL at 18:22

## 2018-10-08 RX ADMIN — AMLODIPINE BESYLATE 10 MG: 5 TABLET ORAL at 08:36

## 2018-10-08 RX ADMIN — PROCHLORPERAZINE MALEATE 5 MG: 5 TABLET, FILM COATED ORAL at 18:22

## 2018-10-08 RX ADMIN — OXYCODONE HYDROCHLORIDE 20 MG: 20 TABLET, FILM COATED, EXTENDED RELEASE ORAL at 06:53

## 2018-10-08 RX ADMIN — SENNOSIDES AND DOCUSATE SODIUM 2 TABLET: 8.6; 5 TABLET ORAL at 08:38

## 2018-10-08 RX ADMIN — DEXAMETHASONE 8 MG: 4 TABLET ORAL at 21:15

## 2018-10-08 RX ADMIN — PROCHLORPERAZINE MALEATE 5 MG: 5 TABLET, FILM COATED ORAL at 06:02

## 2018-10-08 RX ADMIN — CALCIUM CARBONATE-VITAMIN D TAB 500 MG-200 UNIT 1 TABLET: 500-200 TAB at 18:21

## 2018-10-08 RX ADMIN — DOCUSATE SODIUM 100 MG: 100 CAPSULE, LIQUID FILLED ORAL at 18:21

## 2018-10-08 RX ADMIN — ENOXAPARIN SODIUM 40 MG: 100 INJECTION SUBCUTANEOUS at 16:37

## 2018-10-08 RX ADMIN — ASPIRIN 81 MG: 81 TABLET, COATED ORAL at 08:37

## 2018-10-08 RX ADMIN — DOCUSATE SODIUM 100 MG: 100 CAPSULE, LIQUID FILLED ORAL at 08:37

## 2018-10-08 RX ADMIN — HYDROMORPHONE HYDROCHLORIDE 2 MG: 2 TABLET ORAL at 12:44

## 2018-10-08 RX ADMIN — POLYETHYLENE GLYCOL 3350 17 G: 17 POWDER, FOR SOLUTION ORAL at 18:21

## 2018-10-08 RX ADMIN — ACYCLOVIR 400 MG: 800 TABLET ORAL at 08:37

## 2018-10-08 RX ADMIN — PROCHLORPERAZINE MALEATE 5 MG: 5 TABLET, FILM COATED ORAL at 00:03

## 2018-10-08 RX ADMIN — LACTULOSE 20 G: 20 SOLUTION ORAL at 08:38

## 2018-10-08 RX ADMIN — FOLIC ACID 1 MG: 1 TABLET ORAL at 08:38

## 2018-10-08 RX ADMIN — FUROSEMIDE 40 MG: 10 INJECTION, SOLUTION INTRAMUSCULAR; INTRAVENOUS at 08:56

## 2018-10-08 RX ADMIN — POLYETHYLENE GLYCOL 3350 17 G: 17 POWDER, FOR SOLUTION ORAL at 08:46

## 2018-10-08 RX ADMIN — OXYCODONE HYDROCHLORIDE 20 MG: 20 TABLET, FILM COATED, EXTENDED RELEASE ORAL at 18:22

## 2018-10-08 RX ADMIN — Medication 10 ML: at 16:38

## 2018-10-08 RX ADMIN — Medication 10 ML: at 06:03

## 2018-10-08 RX ADMIN — ONDANSETRON HYDROCHLORIDE 4 MG: 2 INJECTION INTRAMUSCULAR; INTRAVENOUS at 16:37

## 2018-10-08 RX ADMIN — Medication 10 ML: at 21:15

## 2018-10-08 RX ADMIN — ONDANSETRON HYDROCHLORIDE 4 MG: 2 INJECTION INTRAMUSCULAR; INTRAVENOUS at 08:47

## 2018-10-08 RX ADMIN — HYDROMORPHONE HYDROCHLORIDE 2 MG: 2 TABLET ORAL at 08:37

## 2018-10-08 NOTE — PROGRESS NOTES
Problem: Mobility Impaired (Adult and Pediatric) Goal: *Acute Goals and Plan of Care (Insert Text) Physical Therapy Goals Initiated 10/5/2018 1. Patient will ambulate with independence for 200 feet with the least restrictive device and O2 sats 90% or better within 7 day(s). 2.  Patient will ascend/descend 2 stairs with 1 handrail(s) with modified independence within 7 day(s). physical Therapy TREATMENT Patient: Robinson Avalos (20 y.o. female) Date: 10/8/2018 Diagnosis: Acute on chronic respiratory failure (HCC) Acute on chronic respiratory failure ([facility] [address] Precautions: Fall Chart, physical therapy assessment, plan of care and goals were reviewed. ASSESSMENT: 
Patient sleeping upon arrival but agreeable to PT session. Overall continues to be indep with bed mobility and supervision with transfers and gait. Patient's activity tolerance is her largest impairment at this time though her O2 sats were good after 125' ambulation (96%). Gait is slow but steady and patient does not exhibit any safety concerns. Cont to recommend her getting up with family often to bathroom and walking in the hallway twice daily with family or staff supervision as well as sitting up in chair for meals. No services recommend at d/c. Progression toward goals: 
[x]    Improving appropriately and progressing toward goals 
[]    Improving slowly and progressing toward goals 
[]    Not making progress toward goals and plan of care will be adjusted PLAN: 
Patient continues to benefit from skilled intervention to address the above impairments. Continue treatment per established plan of care. Discharge Recommendations:  None Further Equipment Recommendations for Discharge:  none SUBJECTIVE:  
Patient stated I'm okay today, just tired.  OBJECTIVE DATA SUMMARY:  
Critical Behavior: 
Neurologic State: Appropriate for age Orientation Level: Oriented X4 
 Cognition: Appropriate decision making, Appropriate for age attention/concentration, Appropriate safety awareness Safety/Judgement: Awareness of environment, Driving appropriateness, Fall prevention, Good awareness of safety precautions, Home safety, Insight into deficits Functional Mobility Training: 
Bed Mobility: 
Rolling: Independent Supine to Sit: Independent Sit to Supine: Independent Scooting: Independent Transfers: 
Sit to Stand: Supervision Stand to Sit: Supervision Bed to Chair: Supervision Balance: 
Sitting: Intact Standing: Intact; Without support Ambulation/Gait Training: 
Distance (ft): 125 Feet (ft) Assistive Device: Gait belt Ambulation - Level of Assistance: Supervision Gait Abnormalities: Decreased step clearance Therapeutic Exercises:  
Deferred today, patient preferred to sit up in chair with LEs elevated Pain: 
Pain Scale 1: Numeric (0 - 10) Pain Intensity 1: 0 Activity Tolerance:  
Fair - limited by overall fatigue, 125' gait Please refer to the flowsheet for vital signs taken during this treatment. After treatment:  
[x]    Patient left in no apparent distress sitting up in chair 
[]    Patient left in no apparent distress in bed 
[x]    Call bell left within reach [x]    Nursing notified 
[x]    Caregiver present 
[]    Bed alarm activated COMMUNICATION/COLLABORATION:  
The patients plan of care was discussed with: Registered Nurse Kieran Taylor, PT Time Calculation: 17 mins

## 2018-10-08 NOTE — PROGRESS NOTES
-Hematology / Oncology ([facility]) - 
-Primary Oncologist- Dr Edenilson Butts feeling better,getting up walking in halls,  
 
-O-  
  
Patient Vitals for the past 24 hrs: 
 Temp Pulse Resp BP SpO2  
10/08/18 0748 97.8 °F (36.6 °C) 81 18 (!) 175/96 98 % 10/08/18 0046 97.8 °F (36.6 °C) 72 20 (!) 152/91 97 % 10/08/18 0003 - (!) 121 20 (!) 160/91 96 % 10/07/18 2253 - - - (!) 163/92 -  
10/07/18 2024 97.8 °F (36.6 °C) 70 20 (!) 159/93 100 % 10/07/18 1441 97.8 °F (36.6 °C) 70 18 (!) 149/99 99 % Gen: nad Chest: bilateral breath sounds present Cardiac: rrr, no m Abd: s/nt no hsm Ext, no cce Neuro non focal 
 
-Labs-   
Recent Labs 10/08/18 
 0327  10/07/18 
 0523 WBC  17.0*  14.1* HGB  9.8*  8.9*  
PLT  580*  506* NA  134*  134* K  4.2  4.2 GLU  134*  139* BUN  16  19 CREA  0.96  0.93  
CA  8.7  8.1*  
 
 
-Imaging-  
 
 
-Assessment + Plan-    
*) NSCLC  
-- (EGFR mutated, S/p gefitinib and tagrisso, progression-> alimta/avastin s/p C2 9/14/18) -- chemo on hold   Was due 10/5. .. She desires continued Rx ,,, will await results of TTE. .. But if stable for discharge would consider getting her home and treated next week at Dr. Brasewll [facility] office *)  Dyspnea No clear evidence of pneumonia No PE 
                     Decadron increased 10/5 to see if that improves symptoms 10/6,7,8 continues to  feel better' 
                         
  
*)  Pericardial effusion TTE to eval for hemodynamic compromise- pending 
  
*)  Anemia Monitor. Stable 
  
*)  DVT proph with lovenox David Gu Sea Schrader MD

## 2018-10-08 NOTE — PROGRESS NOTES
Bedside and Verbal shift change report given to Jairo Mckeon RN (oncoming nurse) by Drenda Canavan, RN (offgoing nurse). Report included the following information SBAR, Kardex and MAR. Pt is resting in bed with  at bedside. Denies any needs at present time. Call light in reach and bed in lowest position. 2308: Dr. Belinda Nunn paged due to BP of 159/93 at 2024 and a BP of 163/92 at 2253. 
 
2332: Dr. Belinda Nunn paged back and was informed of the pt condition. She was given dilaudid to see if that can help bring down the blood pressure. Will recheck in 30 minutes. He request to be paged back with the recheck blood pressure. 0015: Dr. Belinda Nunn informed while he was on the floor that the BP at 003 was 160/91. He states that it is okay and to inform him if the systolic gets above 128. Will continue to monitor. Pt and  denies any needs at present time. No distress noted. Call light in reach and bed in lowest position.

## 2018-10-09 PROBLEM — R41.82 ALTERED MENTAL STATUS: Status: ACTIVE | Noted: 2018-10-09

## 2018-10-09 LAB
ANION GAP SERPL CALC-SCNC: 8 MMOL/L (ref 5–15)
ATRIAL RATE: 86 BPM
BASOPHILS # BLD: 0 K/UL (ref 0–0.1)
BASOPHILS NFR BLD: 0 % (ref 0–1)
BUN SERPL-MCNC: 16 MG/DL (ref 6–20)
BUN/CREAT SERPL: 16 (ref 12–20)
CALCIUM SERPL-MCNC: 9 MG/DL (ref 8.5–10.1)
CALCULATED P AXIS, ECG09: 50 DEGREES
CALCULATED R AXIS, ECG10: -2 DEGREES
CALCULATED T AXIS, ECG11: 16 DEGREES
CHLORIDE SERPL-SCNC: 96 MMOL/L (ref 97–108)
CO2 SERPL-SCNC: 28 MMOL/L (ref 21–32)
CREAT SERPL-MCNC: 0.99 MG/DL (ref 0.55–1.02)
DIAGNOSIS, 93000: NORMAL
DIFFERENTIAL METHOD BLD: ABNORMAL
EOSINOPHIL # BLD: 0 K/UL (ref 0–0.4)
EOSINOPHIL NFR BLD: 0 % (ref 0–7)
ERYTHROCYTE [DISTWIDTH] IN BLOOD BY AUTOMATED COUNT: 25.7 % (ref 11.5–14.5)
GLUCOSE SERPL-MCNC: 142 MG/DL (ref 65–100)
HCT VFR BLD AUTO: 34.2 % (ref 35–47)
HGB BLD-MCNC: 10.5 G/DL (ref 11.5–16)
IMM GRANULOCYTES # BLD: 0.4 K/UL
IMM GRANULOCYTES NFR BLD AUTO: 2 %
LYMPHOCYTES # BLD: 0.4 K/UL (ref 0.8–3.5)
LYMPHOCYTES NFR BLD: 2 % (ref 12–49)
MCH RBC QN AUTO: 26.1 PG (ref 26–34)
MCHC RBC AUTO-ENTMCNC: 30.7 G/DL (ref 30–36.5)
MCV RBC AUTO: 84.9 FL (ref 80–99)
METAMYELOCYTES NFR BLD MANUAL: 1 %
MONOCYTES # BLD: 2.1 K/UL (ref 0–1)
MONOCYTES NFR BLD: 12 % (ref 5–13)
MYELOCYTES NFR BLD MANUAL: 1 %
NEUTS BAND NFR BLD MANUAL: 1 % (ref 0–6)
NEUTS SEG # BLD: 14.7 K/UL (ref 1.8–8)
NEUTS SEG NFR BLD: 81 % (ref 32–75)
NRBC # BLD: 0.1 K/UL (ref 0–0.01)
NRBC BLD-RTO: 0.6 PER 100 WBC
P-R INTERVAL, ECG05: 128 MS
PLATELET # BLD AUTO: 565 K/UL (ref 150–400)
PLATELET COMMENTS,PCOM: ABNORMAL
PMV BLD AUTO: 9.6 FL (ref 8.9–12.9)
POTASSIUM SERPL-SCNC: 4.4 MMOL/L (ref 3.5–5.1)
Q-T INTERVAL, ECG07: 372 MS
QRS DURATION, ECG06: 74 MS
QTC CALCULATION (BEZET), ECG08: 445 MS
RBC # BLD AUTO: 4.03 M/UL (ref 3.8–5.2)
RBC MORPH BLD: ABNORMAL
SODIUM SERPL-SCNC: 132 MMOL/L (ref 136–145)
TROPONIN I SERPL-MCNC: <0.05 NG/ML
VENTRICULAR RATE, ECG03: 86 BPM
WBC # BLD AUTO: 17.9 K/UL (ref 3.6–11)

## 2018-10-09 PROCEDURE — 74011250637 HC RX REV CODE- 250/637: Performed by: HOSPITALIST

## 2018-10-09 PROCEDURE — 74011250636 HC RX REV CODE- 250/636: Performed by: INTERNAL MEDICINE

## 2018-10-09 PROCEDURE — 77010033678 HC OXYGEN DAILY

## 2018-10-09 PROCEDURE — 84484 ASSAY OF TROPONIN QUANT: CPT | Performed by: INTERNAL MEDICINE

## 2018-10-09 PROCEDURE — 74011000250 HC RX REV CODE- 250: Performed by: INTERNAL MEDICINE

## 2018-10-09 PROCEDURE — 65270000029 HC RM PRIVATE

## 2018-10-09 PROCEDURE — 74011250636 HC RX REV CODE- 250/636: Performed by: HOSPITALIST

## 2018-10-09 PROCEDURE — 94760 N-INVAS EAR/PLS OXIMETRY 1: CPT

## 2018-10-09 PROCEDURE — 93005 ELECTROCARDIOGRAM TRACING: CPT

## 2018-10-09 PROCEDURE — 85025 COMPLETE CBC W/AUTO DIFF WBC: CPT | Performed by: INTERNAL MEDICINE

## 2018-10-09 PROCEDURE — 74011000250 HC RX REV CODE- 250: Performed by: HOSPITALIST

## 2018-10-09 PROCEDURE — 36415 COLL VENOUS BLD VENIPUNCTURE: CPT | Performed by: INTERNAL MEDICINE

## 2018-10-09 PROCEDURE — 80048 BASIC METABOLIC PNL TOTAL CA: CPT | Performed by: INTERNAL MEDICINE

## 2018-10-09 RX ADMIN — HYDROMORPHONE HYDROCHLORIDE 2 MG: 2 TABLET ORAL at 10:13

## 2018-10-09 RX ADMIN — PROCHLORPERAZINE MALEATE 5 MG: 5 TABLET, FILM COATED ORAL at 06:03

## 2018-10-09 RX ADMIN — OXYCODONE HYDROCHLORIDE 20 MG: 20 TABLET, FILM COATED, EXTENDED RELEASE ORAL at 06:03

## 2018-10-09 RX ADMIN — POLYETHYLENE GLYCOL 3350 17 G: 17 POWDER, FOR SOLUTION ORAL at 18:00

## 2018-10-09 RX ADMIN — Medication 10 ML: at 13:35

## 2018-10-09 RX ADMIN — FUROSEMIDE 40 MG: 10 INJECTION, SOLUTION INTRAMUSCULAR; INTRAVENOUS at 08:54

## 2018-10-09 RX ADMIN — DEXAMETHASONE 8 MG: 4 TABLET ORAL at 20:00

## 2018-10-09 RX ADMIN — Medication 10 ML: at 06:03

## 2018-10-09 RX ADMIN — LACTULOSE 20 G: 20 SOLUTION ORAL at 18:00

## 2018-10-09 RX ADMIN — DEXAMETHASONE 8 MG: 4 TABLET ORAL at 08:52

## 2018-10-09 RX ADMIN — CALCIUM CARBONATE-VITAMIN D TAB 500 MG-200 UNIT 1 TABLET: 500-200 TAB at 08:52

## 2018-10-09 RX ADMIN — HYDROMORPHONE HYDROCHLORIDE 2 MG: 2 TABLET ORAL at 17:57

## 2018-10-09 RX ADMIN — SENNOSIDES AND DOCUSATE SODIUM 2 TABLET: 8.6; 5 TABLET ORAL at 08:52

## 2018-10-09 RX ADMIN — ONDANSETRON HYDROCHLORIDE 4 MG: 2 INJECTION INTRAMUSCULAR; INTRAVENOUS at 08:54

## 2018-10-09 RX ADMIN — OXYCODONE HYDROCHLORIDE 20 MG: 20 TABLET, FILM COATED, EXTENDED RELEASE ORAL at 19:59

## 2018-10-09 RX ADMIN — MIRTAZAPINE 15 MG: 15 TABLET, ORALLY DISINTEGRATING ORAL at 21:10

## 2018-10-09 RX ADMIN — DOCUSATE SODIUM 100 MG: 100 CAPSULE, LIQUID FILLED ORAL at 17:57

## 2018-10-09 RX ADMIN — ACYCLOVIR 400 MG: 800 TABLET ORAL at 17:56

## 2018-10-09 RX ADMIN — LORAZEPAM 2 MG: 1 TABLET ORAL at 21:10

## 2018-10-09 RX ADMIN — HYDROMORPHONE HYDROCHLORIDE 2 MG: 2 TABLET ORAL at 02:07

## 2018-10-09 RX ADMIN — ASPIRIN 81 MG: 81 TABLET, COATED ORAL at 08:53

## 2018-10-09 RX ADMIN — PROCHLORPERAZINE MALEATE 5 MG: 5 TABLET, FILM COATED ORAL at 13:34

## 2018-10-09 RX ADMIN — DOCUSATE SODIUM 100 MG: 100 CAPSULE, LIQUID FILLED ORAL at 08:52

## 2018-10-09 RX ADMIN — AMLODIPINE BESYLATE 10 MG: 5 TABLET ORAL at 08:53

## 2018-10-09 RX ADMIN — CALCIUM CARBONATE-VITAMIN D TAB 500 MG-200 UNIT 1 TABLET: 500-200 TAB at 17:56

## 2018-10-09 RX ADMIN — FOLIC ACID 1 MG: 1 TABLET ORAL at 08:52

## 2018-10-09 RX ADMIN — POLYETHYLENE GLYCOL 3350 17 G: 17 POWDER, FOR SOLUTION ORAL at 08:51

## 2018-10-09 RX ADMIN — ACYCLOVIR 400 MG: 800 TABLET ORAL at 08:54

## 2018-10-09 RX ADMIN — ENOXAPARIN SODIUM 40 MG: 100 INJECTION SUBCUTANEOUS at 17:57

## 2018-10-09 NOTE — CONSULTS
NEUROLOGY CONSULT NOTE    Name Merced Rogers Age 58 y.o. MRN [id number]  1955     Consulting Physician: Brian Ya,*      Chief Complaint:  AMS     Assessment:     Principal Problem:    Acute on chronic respiratory failure ([address] (10/4/2018)    Active Problems:    Altered mental status (10/9/2018)      58year old AAF h/o NSCLC with bone metastasis on chemotherapy, lupus, anemia of chronic disease, HTN admitted with acute on chronic respiratory insufficiency. Neurology consulted for acute encephalopathy since admission. Neurological examination reveals very slight disorientation to date, delayed speech, inattention. Otherwise non-focal examination apart from baseline RUE weakness. Suspect toxic/metabolic encephalopathy. Would avoid narcotics (Dilaudid/Oxycodone) if possible and monitor for clinical improvement. Evidence of mild hyponatremia as well this admission. Will repeat neuroimaging to exclude intracranial metastasis given her NSCLC. Recommendations:   MRI Brain WWO  Limit narcotics/sedatives if able  Correction of hyponatremia per primary team  Check thyroid labs  Will F/U imaging once completed- please call if further questions    Thank you very much for this referral. I appreciate the opportunity to participate in this patient's care. History of Present Illness: This is a 58 y.o.   female, we were asked to see for AMS. PMH notable for NSCLC with bone metastasis on chemotherapy, lupus, anemia of chronic disease, HTN admitted with acute on chronic respiratory insufficiency. Neurology consulted due to [address]. Family at bedside deny h/o baseline cognitive impairment. She has suffered from prior episodes of encephalopathy during previous hospitalizations according to family. Currently presentation appears different in that she is awake/alert but slow processing. Patient reports sx onset over the past week.   She endorses difficulty articulating words and slight problems with word finding and completing goal oriented tasks. She denies headache. Most recent MRI Brain completed 8/30/18 showing chronic multifocal T2 hyperintensities, no evidence of brain metastasis. Labs notable for mild hyponatremia. She is afebrile with notable leukocytosis this admission possibly related to steroids. No Known Allergies     Prior to Admission medications    Medication Sig Start Date End Date Taking? Authorizing Provider   epoetin karla (PROCRIT) 20,000 unit/mL injection 20,000 Units by SubCUTAneous route every seven (7) days. Indications: CHEMOTHERAPY-INDUCED ANEMIA   Yes Historical Provider   LORazepam (ATIVAN) 2 mg tablet Take 2 mg by mouth every eight (8) hours as needed for Anxiety. Yes Historical Provider   ondansetron hcl (ZOFRAN) 8 mg tablet Take 8 mg by mouth every eight (8) hours as needed for Nausea. Indications: CANCER CHEMOTHERAPY-INDUCED NAUSEA AND VOMITING   Yes Historical Provider   furosemide (LASIX) 20 mg tablet Take 20 mg by mouth daily as needed. Indications: Edema   Yes Historical Provider   dexamethasone (DECADRON) 4 mg tablet Take 4 mg by mouth every twelve (12) hours. Per chemo regimen (day before, day of, day after)   Yes Historical Provider   prochlorperazine (COMPAZINE) 10 mg tablet Take 5 mg by mouth every six (6) hours as needed for Nausea. Yes Historical Provider   acetaminophen (TYLENOL) 325 mg tablet Take 2 Tabs by mouth every six (6) hours as needed. Indications: Headache Disorder, Pain 8/31/18  Yes Brian Krishna MD   amLODIPine (NORVASC) 10 mg tablet Take 1 Tab by mouth daily. Indications: hypertension 9/1/18  Yes Brian Krishna MD   mirtazapine (REMERON SOL-TAB) 15 mg disintegrating tablet Take 1 Tab by mouth nightly. 8/31/18  Yes Brian Krishna MD   oxyCODONE ER (OXYCONTIN) 20 mg ER tablet Take 20 mg by mouth every twelve (12) hours.  Indications: Chronic Pain 8/26/18  Yes Historical Provider HYDROmorphone (DILAUDID) 2 mg tablet Take 2 mg by mouth every four (4) hours as needed for Pain. Yes Historical Provider   acyclovir (ZOVIRAX) 400 mg tablet Take 400 mg by mouth two (2) times a day. Yes Historical Provider   folic acid (FOLVITE) 1 mg tablet Take 1 mg by mouth daily. Yes Historical Provider   triamcinolone (KENALOG) 0.1 % lotion Apply  to affected area two (2) times a day. use thin layer   Yes Historical Provider   aspirin delayed-release 81 mg tablet Take 81 mg by mouth daily. Yes Historical Provider   calcium citrate-vitamin D3 (CITRACAL + D) tablet Take 1 Tab by mouth two (2) times a day. Yes Historical Provider   multivitamin (ONE A DAY) tablet Take 1 Tab by mouth daily. Yes Historical Provider   bevacizumab (AVASTIN IV) by IntraVENous route. Historical Provider   pemetrexed disodium (ALIMTA IV) by IntraVENous route. Once every 3 weeks. Historical Provider       Past Medical History:   Diagnosis Date    Arthritis     Autoimmune disease ([facility]     lupus    Cancer ([facility]     lung cancer    Gastrointestinal disorder     GERD    Hypertension         Past Surgical History:   Procedure Laterality Date    HX CARPAL TUNNEL RELEASE      HX  SECTION      HX HYSTERECTOMY          Social History   Substance Use Topics    Smoking status: Never Smoker    Smokeless tobacco: Never Used    Alcohol use No        History reviewed. No pertinent family history. Review of Systems:   Comprehensive review of systems performed and negative except for as listed above.      Exam:     Visit Vitals    /82 (BP 1 Location: Left arm, BP Patient Position: At rest)    Pulse 77    Temp 99 °F (37.2 °C)    Resp 18    Ht 5' 2.5\" (1.588 m)    Wt 64.6 kg (142 lb 8 oz)    SpO2 96%    BMI 25.65 kg/m2        General: Patient in no apparent distress   Head: Normocephalic, atraumatic, anicteric sclera   Lungs:  Clear to auscultation bilaterally, No wheezes or rubs   Cardiac: Regular rate and rhythm with no murmurs. Abd: Bowel sounds were audible. No tenderness on palpation   Ext: No pedal edema   Skin: No overt signs of rash     Neurological Exam:  Mental Status: Alert and oriented to person place, \"Oct 10, 2018\". Cannot recall POTUS. Difficulty with serial 7's. Speech: Fluent no aphasia or dysarthria. +Delayed speech pattern. Cranial Nerves:   Intact visual fields. Facial sensation is normal. Facial movement is symmetric. Palate is midline. Normal sternocleidomastoid strength. Tongue is midline. Hearing is intact bilaterally. Eyes: PERRL, EOM's full, no nystagmus, no ptosis. Motor:  5/5 throughout except proximal RUE 4+/5 (metastasis)    Reflexes:   Deep tendon reflexes 1+/4 and symmetrical.  Plantar response is downgoing b/l. Sensory:   Symmetrically intact  with no perceived deficits modalities involving small or large fibers. Gait:  Gait is deferred   Tremor:   No tremor noted. Cerebellar:  Finger to nose and heel over shin to knee was demonstrated competently. Neurovascular: No carotid bruits. MRI Results (maximum last 3): Results from [place] encounter on 08/25/18   MRI BRAIN W WO CONT   Narrative EXAM:  MRI BRAIN W WO CONT  INDICATION:  New onset headaches, h/o metastatic lung cancer, history of lung  cancer and lupus. TECHNIQUE:   Sagittal and axial T1 weighted images were obtained followed by intravenous  infusion 15 mL Dotarem and axial FLAIR and T-2 weighted images. Axial and  sagittal 3-D MP rage volume acquisition with thin axial and coronal  reconstructed T1-weighted images were obtained as well as sagittal FLAIR images  and axial diffusion weighted images. COMPARISON: CT head 8/29/18, MRI 9/28/17  FINDINGS:  The ventricular size and configuration are normal.   Multiple small foci of T2 hyperintensity again noted in the cerebral white  matter in a nonspecific pattern. No associated restricted diffusion or abnormal  enhancement. Nonspecific pattern possibly related to patient's lupus or other  chronic small vessel ischemic process. No abnormal enhancement or findings that would suggest brain metastasis. There is no evidence of intracranial hemorrhage, infarct, mass or abnormal  extra-axial fluid collections. Flow voids are present in the vertebral, basilar and carotid artery systems. Slight low-lying cerebellar tonsils unchanged since 2017. The craniocervical  junction is unremarkable. Small fluid right mastoid air cells. Minimal mucosal thickening left posterior  ethmoid air cells. Other structures of the skull base are unremarkable. Impression IMPRESSION:   1. Chronic multifocal nonspecific white matter T2 hyperintensity again noted and  without significant change. Possibly related patient's known lupus or other  chronic small vessel ischemic process. 2. No evidence of brain metastasis. Lab Review  Lab Results   Component Value Date/Time    WBC 17.9 (H) 10/09/2018 02:16 AM    HCT 34.2 (L) 10/09/2018 02:16 AM    HGB 10.5 (L) 10/09/2018 02:16 AM    PLATELET 349 (H) 23/91/3277 02:16 AM     Lab Results   Component Value Date/Time    Sodium 132 (L) 10/09/2018 02:16 AM    Potassium 4.4 10/09/2018 02:16 AM    Chloride 96 (L) 10/09/2018 02:16 AM    CO2 28 10/09/2018 02:16 AM    Glucose 142 (H) 10/09/2018 02:16 AM    BUN 16 10/09/2018 02:16 AM    Creatinine 0.99 10/09/2018 02:16 AM    Calcium 9.0 10/09/2018 02:16 AM     No components found for: TROPQUANT  No results found for: JATINDER    Signed:  Kiko Banuelos.  Caleb Saleem DO  10/9/2018  3:21 PM

## 2018-10-09 NOTE — PROGRESS NOTES
Hospitalist Progress Note Viri Pollack MD 
Answering service: [phone] OR [phone] from in house phone Date of Service:  10/9/2018 NAME:  Camron Jung :  1955 MRN:  [id number] Admission Summary:  
53-year-old -American female with history of stage IV lung cancer with liver mass presented with dyspnea and increased oxygen requirement. She normally uses oxygen via nasal canula 1 l/min Interval history / Subjective:  
  
Would like to speak to pallaitive care today, neurology consulted for AMS. Today no complaints, and wondering when she will be able to leave the hospital.  
Assessment & Plan:  
 
Acute on chronic respiratory failure could be due to the progression of her lung cancer -PFTs indicated restrictive pattern 
-CTA lung is negative for PE. There are multiple lesions, several subsegmental bronchi obstructed by the tumor. A large liver mass is present 
-TTE with EF of 56% - no WMA, and no pericardial effusion 
-Undergoing chemotherapy per Heme 
-Oncology recs appreciated; ?ready for discharge soon - On decadron for dyspnea per oncology - Will need O2 on discharge AMS - waxing and waning, no h/o mets from last MRI in august.  
- Neuro consulted, MRI ordered - Check thyroid labs 
  
Gastroesophageal reflux disease: Continue PPI. 
  
Normocytic anemia likely of chronic disease: Stable.  
  
Constipation: Improved, enema PRN  
  
Lupus. Not on any treatment currenlty. 
  
Shoulder pain: Try lidocaine patch. 
  
HTN - amlodipine, H/o HSV? - acyclovir Mild Hyponatremia. ?Related to her malignancy 
  
Diet: Regular Code status: Full DVT prophylaxis: SCDs Care Plan discussed with: patient, daughter Disposition: Home w/Family Hospital Problems  Date Reviewed: 2018 Codes Class Noted POA Altered mental status ICD-10-CM: R41.82 
ICD-9-CM: 780.97  10/9/2018 Unknown * (Principal)Acute on chronic respiratory failure (HCC) ICD-10-CM: J96.20 ICD-9-CM: 518.84  10/4/2018 Yes Review of Systems: A comprehensive review of systems was negative except for that written in the HPI. Vital Signs:  
 Last 24hrs VS reviewed since prior progress note. Most recent are: 
Visit Vitals  BP (!) 187/93  Pulse 86  Temp 97.9 °F (36.6 °C)  Resp 18  Ht 5' 2\" (1.575 m)  Wt 64 kg (141 lb 1.5 oz)  SpO2 100%  BMI 25.81 kg/m2 No intake or output data in the 24 hours ending 10/09/18 1958 Physical Examination:  
 
 
     
Constitutional:  No acute distress, cooperative, pleasant   
ENT:  Oral mucous moist, oropharynx benign. Neck supple, Resp:  CTA bilaterally. No wheezing/rhonchi/rales. No accessory muscle use. O2 in place CV:  Regular rhythm, normal rate, no murmurs, gallops, rubs GI:  Soft, non distended, non tender. normoactive bowel sounds, no hepatosplenomegaly Musculoskeletal:  No edema, warm, 2+ pulses throughout Neurologic:  Moves all extremities. AAOx3, CN II-XII reviewed Skin:  No rashes Data Review:  
 Review and/or order of clinical lab test 
Review and/or order of tests in the radiology section of CPT Review and/or order of tests in the medicine section of CPT Labs:  
 
Recent Labs 10/09/18 
 0216  10/08/18 
 0327 WBC  17.9*  17.0*  
HGB  10.5*  9.8* HCT  34.2*  31.8*  
PLT  565*  580* Recent Labs 10/09/18 
 0216  10/08/18 
 0327  10/07/18 
 7290 NA  132*  134*  134* K  4.4  4.2  4.2 CL  96*  98  100 CO2  28  27  24 BUN  16  16  19 CREA  0.99  0.96  0.93 GLU  142*  134*  139* CA  9.0  8.7  8.1* No results for input(s): SGOT, GPT, ALT, AP, TBIL, TBILI, TP, ALB, GLOB, GGT, AML, LPSE in the last 72 hours. No lab exists for component: AMYP, HLPSE No results for input(s): INR, PTP, APTT in the last 72 hours.  
 
No lab exists for component: INREXT  
 No results for input(s): FE, TIBC, PSAT, FERR in the last 72 hours. No results found for: FOL, RBCF No results for input(s): PH, PCO2, PO2 in the last 72 hours. Recent Labs 10/09/18 
 1857 TROIQ  <0.05 No results found for: CHOL, CHOLX, CHLST, CHOLV, HDL, LDL, LDLC, DLDLP, TGLX, TRIGL, TRIGP, CHHD, CHHDX Lab Results Component Value Date/Time Glucose (POC) 119 (H) 08/31/2018 11:10 AM  
 Glucose (POC) 94 08/31/2018 06:40 AM  
 Glucose (POC) 136 (H) 08/30/2018 10:12 PM  
 Glucose (POC) 114 (H) 08/30/2018 04:15 PM  
 Glucose (POC) 133 (H) 08/30/2018 11:06 AM  
 
Lab Results Component Value Date/Time Color YELLOW/STRAW 10/04/2018 01:19 PM  
 Appearance CLEAR 10/04/2018 01:19 PM  
 Specific gravity 1.019 10/04/2018 01:19 PM  
 pH (UA) 6.5 10/04/2018 01:19 PM  
 Protein NEGATIVE  10/04/2018 01:19 PM  
 Glucose NEGATIVE  10/04/2018 01:19 PM  
 Ketone NEGATIVE  10/04/2018 01:19 PM  
 Bilirubin NEGATIVE  10/04/2018 01:19 PM  
 Urobilinogen 0.2 10/04/2018 01:19 PM  
 Nitrites NEGATIVE  10/04/2018 01:19 PM  
 Leukocyte Esterase NEGATIVE  10/04/2018 01:19 PM  
 Epithelial cells FEW 10/04/2018 01:19 PM  
 Bacteria NEGATIVE  10/04/2018 01:19 PM  
 WBC 0-4 10/04/2018 01:19 PM  
 RBC 0-5 10/04/2018 01:19 PM  
 
 
 
Medications Reviewed:  
 
Current Facility-Administered Medications Medication Dose Route Frequency  mineral oil (FLEET) enema   Rectal PRN  
 lidocaine (LIDODERM) 5 % patch 1 Patch  1 Patch TransDERmal Q24H  
 docusate sodium (COLACE) capsule 100 mg  100 mg Oral BID  lactulose (CHRONULAC) solution 20 g  30 mL Oral BID  polyethylene glycol (MIRALAX) packet 17 g  17 g Oral BID  senna-docusate (PERICOLACE) 8.6-50 mg per tablet 2 Tab  2 Tab Oral DAILY  dexamethasone (DECADRON) tablet 8 mg  8 mg Oral Q12H  prochlorperazine (COMPAZINE) tablet 5 mg  5 mg Oral Q6H PRN  
 amLODIPine (NORVASC) tablet 10 mg  10 mg Oral DAILY  acyclovir (ZOVIRAX) tablet 400 mg  400 mg Oral BID  
  aspirin delayed-release tablet 81 mg  81 mg Oral DAILY  calcium-vitamin D (OS-KENIA) 500 mg-200 unit tablet  1 Tab Oral BID  folic acid (FOLVITE) tablet 1 mg  1 mg Oral DAILY  HYDROmorphone (DILAUDID) tablet 2 mg  2 mg Oral Q4H PRN  
 LORazepam (ATIVAN) tablet 2 mg  2 mg Oral Q8H PRN  mirtazapine (REMERON SOL-TAB) disintegrating tablet 15 mg  15 mg Oral QHS  oxyCODONE ER (OxyCONTIN) tablet 20 mg  20 mg Oral Q12H  furosemide (LASIX) injection 40 mg  40 mg IntraVENous DAILY  sodium chloride (NS) flush 5-10 mL  5-10 mL IntraVENous Q8H  
 sodium chloride (NS) flush 5-10 mL  5-10 mL IntraVENous PRN  
 naloxone (NARCAN) injection 0.4 mg  0.4 mg IntraVENous PRN  
 enoxaparin (LOVENOX) injection 40 mg  40 mg SubCUTAneous Q24H  
 acetaminophen (TYLENOL) tablet 650 mg  650 mg Oral Q4H PRN  
 ondansetron (ZOFRAN) injection 4 mg  4 mg IntraVENous Q4H PRN  
 
______________________________________________________________________ EXPECTED LENGTH OF STAY: 3d 19h ACTUAL LENGTH OF STAY:          5 
 
            
Angelica Nevarez MD

## 2018-10-09 NOTE — PROGRESS NOTES
-Hematology / Oncology ([facility]) - 
-Primary Oncologist- Dr Edenilson Butts feeling better,getting up walking in halls, pt. Appears stable but family is concerned that she is more confused 
 
-O-  
  
Patient Vitals for the past 24 hrs: 
 Temp Pulse Resp BP SpO2  
10/09/18 0822 98.5 °F (36.9 °C) 84 18 (!) 175/92 96 % 10/09/18 0205 97.7 °F (36.5 °C) 83 16 162/89 95 % 10/08/18 2050 - - - - 96 % 10/08/18 1958 97.9 °F (36.6 °C) 73 18 153/81 100 % 10/08/18 1526 97.8 °F (36.6 °C) 77 18 156/85 99 % Gen: nad Chest: bilateral breath sounds present Cardiac: rrr, no m Abd: s/nt no hsm Ext, no cce Neuro non focal 
 
-Labs-   
Recent Labs 10/09/18 
 0216  10/08/18 
 0327  10/07/18 
 4337 WBC  17.9*  17.0*  14.1* HGB  10.5*  9.8*  8.9*  
PLT  565*  580*  506* ANEU  14.7*   --    --   
NA  132*  134*  134* K  4.4  4.2  4.2 GLU  142*  134*  139* BUN  16  16  19 CREA  0.99  0.96  0.93  
CA  9.0  8.7  8.1*  
 
 
-Imaging-  
 
 
-Assessment + Plan-    
*) NSCLC  
-- (EGFR mutated, S/p gefitinib and tagrisso, progression-> alimta/avastin s/p C2 9/14/18) -- chemo on hold   Was due 10/5. .. She desires continued Rx ,,, will await results of TTE. .. But if stable for discharge would consider getting her home and treated next week at Dr. Braswell Medico office *)  Dyspnea No clear evidence of pneumonia No PE 
                     Decadron increased 10/5 to see if that improves symptoms 10/6,7,8 continues to  feel better' 
                         
  
*)  Pericardial effusion TTE to eval for hemodynamic compromise- pending still 
  
*)  Anemia 10.5 today better 
  
*)  DVT proph with lovenox 
 
) confusion. .. Probably multi-factorial. Family states this is new however I remember her from a prior admission in similar mental status. .. Will ask neurology to see, consider brain imaging David Mcwilliams Zaria Garza MD

## 2018-10-09 NOTE — PROGRESS NOTES
Bedside and Verbal shift change report given to Jack Mendoza (oncoming nurse) by Luis Moreno (offgoing nurse). Report included the following information SBAR.

## 2018-10-09 NOTE — PROGRESS NOTES
-Hematology / Oncology ([facility]) - 
-Primary Oncologist- Dr Chaim Pyle feeling better,getting up walking in halls, maybe a little more confused 
 
-O-  
  
Patient Vitals for the past 24 hrs: 
 Temp Pulse Resp BP SpO2  
10/09/18 0822 98.5 °F (36.9 °C) 84 18 (!) 175/92 96 % 10/09/18 0205 97.7 °F (36.5 °C) 83 16 162/89 95 % 10/08/18 2050 - - - - 96 % 10/08/18 1958 97.9 °F (36.6 °C) 73 18 153/81 100 % 10/08/18 1526 97.8 °F (36.6 °C) 77 18 156/85 99 % Gen: nad Chest: bilateral breath sounds present Cardiac: rrr, no m Abd: s/nt no hsm Ext, no cce Neuro non focal 
 
-Labs-   
Recent Labs 10/09/18 
 0216  10/08/18 
 0327  10/07/18 
 8392 WBC  17.9*  17.0*  14.1* HGB  10.5*  9.8*  8.9*  
PLT  565*  580*  506* ANEU  14.7*   --    --   
NA  132*  134*  134* K  4.4  4.2  4.2 GLU  142*  134*  139* BUN  16  16  19 CREA  0.99  0.96  0.93  
CA  9.0  8.7  8.1*  
 
 
-Imaging-  
 
 
-Assessment + Plan-    
*) NSCLC  
-- (EGFR mutated, S/p gefitinib and tagrisso, progression-> alimta/avastin s/p C2 9/14/18) -- chemo on hold   Was due 10/5. .. She desires continued Rx ,,, will await results of TTE. .. But if stable for discharge would consider getting her home and treated next week at Dr. Curtis Griffiths office *)  Dyspnea No clear evidence of pneumonia No PE 
                     Decadron increased 10/5 to see if that improves symptoms 10/6,7,8 continues to  feel better' 
                         
  
*)  Pericardial effusion TTE to eval for hemodynamic compromise- pending 
  
*)  Anemia 10.5 improved 
) confusion. . Not sure if this is new , nursing and I remember her from a prior admit with similar affect. .. Will ask neurology to see 
  
*)  DVT proph with lovenox Sally Paul MD

## 2018-10-09 NOTE — PROGRESS NOTES
1837 
EKG completed by [facility] to notify of EKG resulting, stat cardiac enzymes ordered, will continue to monitor

## 2018-10-10 ENCOUNTER — APPOINTMENT (OUTPATIENT)
Dept: GENERAL RADIOLOGY | Age: 63
DRG: 181 | End: 2018-10-10
Attending: INTERNAL MEDICINE
Payer: COMMERCIAL

## 2018-10-10 ENCOUNTER — APPOINTMENT (OUTPATIENT)
Dept: MRI IMAGING | Age: 63
DRG: 181 | End: 2018-10-10
Attending: PSYCHIATRY & NEUROLOGY
Payer: COMMERCIAL

## 2018-10-10 LAB
ANION GAP SERPL CALC-SCNC: 8 MMOL/L (ref 5–15)
BASOPHILS # BLD: 0 K/UL (ref 0–0.1)
BASOPHILS NFR BLD: 0 % (ref 0–1)
BUN SERPL-MCNC: 17 MG/DL (ref 6–20)
BUN/CREAT SERPL: 18 (ref 12–20)
CALCIUM SERPL-MCNC: 9.5 MG/DL (ref 8.5–10.1)
CHLORIDE SERPL-SCNC: 95 MMOL/L (ref 97–108)
CO2 SERPL-SCNC: 27 MMOL/L (ref 21–32)
CREAT SERPL-MCNC: 0.97 MG/DL (ref 0.55–1.02)
DIFFERENTIAL METHOD BLD: ABNORMAL
EOSINOPHIL # BLD: 0 K/UL (ref 0–0.4)
EOSINOPHIL NFR BLD: 0 % (ref 0–7)
ERYTHROCYTE [DISTWIDTH] IN BLOOD BY AUTOMATED COUNT: 25.4 % (ref 11.5–14.5)
GLUCOSE SERPL-MCNC: 161 MG/DL (ref 65–100)
HCT VFR BLD AUTO: 32.7 % (ref 35–47)
HGB BLD-MCNC: 10.2 G/DL (ref 11.5–16)
IMM GRANULOCYTES # BLD: 0 K/UL
IMM GRANULOCYTES NFR BLD AUTO: 0 %
LYMPHOCYTES # BLD: 1 K/UL (ref 0.8–3.5)
LYMPHOCYTES NFR BLD: 5 % (ref 12–49)
MAGNESIUM SERPL-MCNC: 2.4 MG/DL (ref 1.6–2.4)
MCH RBC QN AUTO: 26 PG (ref 26–34)
MCHC RBC AUTO-ENTMCNC: 31.2 G/DL (ref 30–36.5)
MCV RBC AUTO: 83.2 FL (ref 80–99)
MONOCYTES # BLD: 2.5 K/UL (ref 0–1)
MONOCYTES NFR BLD: 12 % (ref 5–13)
MYELOCYTES NFR BLD MANUAL: 1 %
NEUTS BAND NFR BLD MANUAL: 1 % (ref 0–6)
NEUTS SEG # BLD: 16.8 K/UL (ref 1.8–8)
NEUTS SEG NFR BLD: 81 % (ref 32–75)
NRBC # BLD: 0.1 K/UL (ref 0–0.01)
NRBC BLD-RTO: 0.5 PER 100 WBC
PHOSPHATE SERPL-MCNC: 3.2 MG/DL (ref 2.6–4.7)
PLATELET # BLD AUTO: 516 K/UL (ref 150–400)
PLATELET COMMENTS,PCOM: ABNORMAL
PMV BLD AUTO: 9.9 FL (ref 8.9–12.9)
POTASSIUM SERPL-SCNC: 4.2 MMOL/L (ref 3.5–5.1)
RBC # BLD AUTO: 3.93 M/UL (ref 3.8–5.2)
RBC MORPH BLD: ABNORMAL
SODIUM SERPL-SCNC: 130 MMOL/L (ref 136–145)
TROPONIN I SERPL-MCNC: <0.05 NG/ML
WBC # BLD AUTO: 20.5 K/UL (ref 3.6–11)

## 2018-10-10 PROCEDURE — 74011250636 HC RX REV CODE- 250/636: Performed by: HOSPITALIST

## 2018-10-10 PROCEDURE — 74011250637 HC RX REV CODE- 250/637: Performed by: HOSPITALIST

## 2018-10-10 PROCEDURE — 74011250636 HC RX REV CODE- 250/636: Performed by: INTERNAL MEDICINE

## 2018-10-10 PROCEDURE — A9575 INJ GADOTERATE MEGLUMI 0.1ML: HCPCS | Performed by: INTERNAL MEDICINE

## 2018-10-10 PROCEDURE — 76450000000

## 2018-10-10 PROCEDURE — 84100 ASSAY OF PHOSPHORUS: CPT | Performed by: INTERNAL MEDICINE

## 2018-10-10 PROCEDURE — 71045 X-RAY EXAM CHEST 1 VIEW: CPT

## 2018-10-10 PROCEDURE — 85025 COMPLETE CBC W/AUTO DIFF WBC: CPT | Performed by: INTERNAL MEDICINE

## 2018-10-10 PROCEDURE — 74011000250 HC RX REV CODE- 250: Performed by: HOSPITALIST

## 2018-10-10 PROCEDURE — 83735 ASSAY OF MAGNESIUM: CPT | Performed by: INTERNAL MEDICINE

## 2018-10-10 PROCEDURE — 80048 BASIC METABOLIC PNL TOTAL CA: CPT | Performed by: INTERNAL MEDICINE

## 2018-10-10 PROCEDURE — 70553 MRI BRAIN STEM W/O & W/DYE: CPT

## 2018-10-10 PROCEDURE — 94760 N-INVAS EAR/PLS OXIMETRY 1: CPT

## 2018-10-10 PROCEDURE — 36415 COLL VENOUS BLD VENIPUNCTURE: CPT | Performed by: INTERNAL MEDICINE

## 2018-10-10 PROCEDURE — 74011250636 HC RX REV CODE- 250/636: Performed by: NURSE PRACTITIONER

## 2018-10-10 PROCEDURE — 65270000029 HC RM PRIVATE

## 2018-10-10 PROCEDURE — 84484 ASSAY OF TROPONIN QUANT: CPT | Performed by: INTERNAL MEDICINE

## 2018-10-10 RX ORDER — GADOTERATE MEGLUMINE 376.9 MG/ML
13 INJECTION INTRAVENOUS
Status: COMPLETED | OUTPATIENT
Start: 2018-10-10 | End: 2018-10-10

## 2018-10-10 RX ORDER — HYDRALAZINE HYDROCHLORIDE 20 MG/ML
10 INJECTION INTRAMUSCULAR; INTRAVENOUS
Status: DISCONTINUED | OUTPATIENT
Start: 2018-10-10 | End: 2018-10-11 | Stop reason: HOSPADM

## 2018-10-10 RX ORDER — METOCLOPRAMIDE HYDROCHLORIDE 5 MG/ML
10 INJECTION INTRAMUSCULAR; INTRAVENOUS
Status: DISCONTINUED | OUTPATIENT
Start: 2018-10-10 | End: 2018-10-11 | Stop reason: HOSPADM

## 2018-10-10 RX ORDER — SODIUM CHLORIDE 0.9 % (FLUSH) 0.9 %
10 SYRINGE (ML) INJECTION
Status: COMPLETED | OUTPATIENT
Start: 2018-10-10 | End: 2018-10-10

## 2018-10-10 RX ADMIN — ONDANSETRON HYDROCHLORIDE 4 MG: 2 INJECTION INTRAMUSCULAR; INTRAVENOUS at 00:52

## 2018-10-10 RX ADMIN — GADOTERATE MEGLUMINE 13 ML: 376.9 INJECTION INTRAVENOUS at 21:02

## 2018-10-10 RX ADMIN — FOLIC ACID 1 MG: 1 TABLET ORAL at 08:45

## 2018-10-10 RX ADMIN — Medication 10 ML: at 13:39

## 2018-10-10 RX ADMIN — FUROSEMIDE 40 MG: 10 INJECTION, SOLUTION INTRAMUSCULAR; INTRAVENOUS at 08:46

## 2018-10-10 RX ADMIN — POLYETHYLENE GLYCOL 3350 17 G: 17 POWDER, FOR SOLUTION ORAL at 17:13

## 2018-10-10 RX ADMIN — CALCIUM CARBONATE-VITAMIN D TAB 500 MG-200 UNIT 1 TABLET: 500-200 TAB at 17:11

## 2018-10-10 RX ADMIN — HYDROMORPHONE HYDROCHLORIDE 2 MG: 2 TABLET ORAL at 21:45

## 2018-10-10 RX ADMIN — Medication 10 ML: at 21:45

## 2018-10-10 RX ADMIN — DEXAMETHASONE 8 MG: 4 TABLET ORAL at 08:44

## 2018-10-10 RX ADMIN — Medication 10 ML: at 22:46

## 2018-10-10 RX ADMIN — ONDANSETRON HYDROCHLORIDE 4 MG: 2 INJECTION INTRAMUSCULAR; INTRAVENOUS at 22:46

## 2018-10-10 RX ADMIN — ACYCLOVIR 400 MG: 800 TABLET ORAL at 08:45

## 2018-10-10 RX ADMIN — ACYCLOVIR 400 MG: 800 TABLET ORAL at 17:12

## 2018-10-10 RX ADMIN — ENOXAPARIN SODIUM 40 MG: 100 INJECTION SUBCUTANEOUS at 17:14

## 2018-10-10 RX ADMIN — POLYETHYLENE GLYCOL 3350 17 G: 17 POWDER, FOR SOLUTION ORAL at 08:44

## 2018-10-10 RX ADMIN — DOCUSATE SODIUM 100 MG: 100 CAPSULE, LIQUID FILLED ORAL at 08:45

## 2018-10-10 RX ADMIN — Medication 10 ML: at 06:09

## 2018-10-10 RX ADMIN — OXYCODONE HYDROCHLORIDE 20 MG: 20 TABLET, FILM COATED, EXTENDED RELEASE ORAL at 06:08

## 2018-10-10 RX ADMIN — HYDRALAZINE HYDROCHLORIDE 10 MG: 20 INJECTION INTRAMUSCULAR; INTRAVENOUS at 04:04

## 2018-10-10 RX ADMIN — ASPIRIN 81 MG: 81 TABLET, COATED ORAL at 08:45

## 2018-10-10 RX ADMIN — DOCUSATE SODIUM 100 MG: 100 CAPSULE, LIQUID FILLED ORAL at 17:13

## 2018-10-10 RX ADMIN — AMLODIPINE BESYLATE 10 MG: 5 TABLET ORAL at 08:43

## 2018-10-10 RX ADMIN — ONDANSETRON HYDROCHLORIDE 4 MG: 2 INJECTION INTRAMUSCULAR; INTRAVENOUS at 19:14

## 2018-10-10 RX ADMIN — Medication 10 ML: at 21:03

## 2018-10-10 RX ADMIN — CALCIUM CARBONATE-VITAMIN D TAB 500 MG-200 UNIT 1 TABLET: 500-200 TAB at 08:44

## 2018-10-10 RX ADMIN — SENNOSIDES AND DOCUSATE SODIUM 2 TABLET: 8.6; 5 TABLET ORAL at 08:44

## 2018-10-10 RX ADMIN — MIRTAZAPINE 15 MG: 15 TABLET, ORALLY DISINTEGRATING ORAL at 21:54

## 2018-10-10 RX ADMIN — OXYCODONE HYDROCHLORIDE 20 MG: 20 TABLET, FILM COATED, EXTENDED RELEASE ORAL at 18:30

## 2018-10-10 RX ADMIN — DEXAMETHASONE 8 MG: 4 TABLET ORAL at 21:45

## 2018-10-10 NOTE — ACP (ADVANCE CARE PLANNING)
Advance Care Planning Note    Name: Velvet Coronado  YOB: 1955  MRN: [id number]  Admission Date: 10/4/2018 10:37 AM    Date of discussion: 10/9/2018    Active Diagnoses:    Hospital Problems  Date Reviewed: 8/25/2018          Codes Class Noted POA    Altered mental status ICD-10-CM: R41.82  ICD-9-CM: 780.97  10/9/2018 Unknown        * (Principal)Acute on chronic respiratory failure [facility]) ICD-10-CM: J96.20  ICD-9-CM: 518.84  10/4/2018 Yes        Lung cancer ([facility] [address] ICD-10-CM: C34.90  ICD-9-CM: 162.9  9/26/2018 Yes        Shortness of breath ICD-10-CM: R06.02  ICD-9-CM: 786.05  8/25/2018 Yes               These active diagnoses are of sufficient risk that focused discussion on advance care planning is indicated in order to allow the patient to thoughtfully consider personal goals of care, and if situations arise that prevent the ability to personally give input, to ensure appropriate representation of their personal desires for different levels and aggressiveness of care. Discussion:     Persons present and participating in discussion: Haseeb Rivera MD, Her , and her daughter, RN Tasha Harrington    Discussion:   Asked about the families understanding of Mrs. Castro' cancer, and prognosis. They explained that they understand that she has extensive cancer, and is currenlty getting palliation chemotherapy. We discussed what palliation chemotherapy means, and that the hope is to slow progression of disease. Discussed that her disease is however, progressive, which means that they should begin having end of life discussion. We discussed what the patient would want in terms of code status. She currently reaffirmed her desire to have CPR and intuabtion should the need arise. We discussed that in the future, her lung cancer will lead to death, and at that time it will be impossible to reverse with CPR or intubation. Emphasized that these decisions do not influence decision for more palliative chemotherapy, but that she should consider DNR given her extensive disease. Time Spent: 18 minutes    Total time spent face-to-face in education and discussion: 18 minutes.      Lukasz Barker MD  10/9/2018  8:07 PM

## 2018-10-10 NOTE — PROGRESS NOTES
Care Management Interventions PCP Verified by CM: Yes 
Palliative Care Criteria Met (RRAT>21 & CHF Dx)?: Yes 
Palliative Consult Recommended?: Yes Mode of Transport at Discharge: Other (see comment) (Family ) Transition of Care Consult (CM Consult): Discharge Planning Discharge Durable Medical Equipment: No 
Physical Therapy Consult: Yes Occupational Therapy Consult: Yes Speech Therapy Consult: No 
Current Support Network: Lives with Spouse, Own Home Confirm Follow Up Transport: Self Plan discussed with Pt/Family/Caregiver: Yes [employer] Information Provided?: No 
Discharge Location Discharge Placement: Home with family assistance Reason for Admission:   Patient was admitted on 10/4 due to shortness of breath and hypoxia. Last admissions was Aug 2018 due to SOB. History of stage IV lung cancer with liver mass, GERD, lupus and hypertension RRAT Score:          12 Plan for utilizing home health:      PT/OT were consulted. No needs indicated at this time Likelihood of Readmission:  Low Transition of Care Plan:           TBD; Likely discharge home with family assistance. Patient lives with  in two level home. Patient requests some assistance with ADLs due to the progression of cancer. Patient uses 2L of O2 (supllied by Maryam Doan). Confirmed PCP is Dr. Marcelle Kang but does not see PCP on regular basis. Fills Rx at [facility]. Palliative was consulted. CM will continue to follow patient's disposition and assist with discharge needs. TERRY Chandler,CRM

## 2018-10-10 NOTE — PROGRESS NOTES
-Hematology / Oncology ([facility]) - 
-Primary Oncologist- Dr Eve Collins-  She is sleepy again this am, inattentive, has pain in back intermittently, nausea controlled, no bm x 2 days 
 
-O-  
  
Patient Vitals for the past 24 hrs: 
 Temp Pulse Resp BP SpO2  
10/10/18 0821 98.8 °F (37.1 °C) 86 16 (!) 167/95 100 % 10/10/18 0446 - 96 - 165/77 -  
10/10/18 0323 98.5 °F (36.9 °C) (!) 102 16 (!) 182/108 100 % 10/10/18 0252 - 98 16 (!) 167/97 100 % 10/09/18 2200 - 82 18 167/81 -  
10/09/18 1843 97.9 °F (36.6 °C) 86 18 (!) 187/93 -  
10/09/18 1833 - 86 18 (!) 179/91 100 % 10/09/18 1444 99 °F (37.2 °C) 77 18 150/82 96 % Gen: nad Chest: bilateral breath sounds present, crackles r base Cardiac: rrr, no m Abd: s/nt no hsm Ext, no cce Neuro non focal 
 
-Labs-   
Recent Labs 10/10/18 
 0303  10/09/18 
 0216  10/08/18 
 0327 WBC  20.5*  17.9*  17.0*  
HGB  10.2*  10.5*  9.8* PLT  516*  565*  580* ANEU  16.8*  14.7*   --   
NA  130*  132*  134* K  4.2  4.4  4.2 GLU  161*  142*  134* BUN  17  16  16 CREA  0.97  0.99  0.96  
CA  9.5  9.0  8.7 MG  2.4   --    --   
PHOS  3.2   --    --   
 
 
-Imaging-  
 
 
-Assessment + Plan-    
*) NSCLC  
-- (EGFR mutated, S/p gefitinib and tagrisso, progression-> alimta/avastin s/p C2 9/14/18) -- chemo on hold   Was due 10/5. .. She desires continued Rx ,,, will await results of TTE. .. But if stable for discharge would consider getting her home and treated next week at Dr. Ninfa Krishnan office *)  Dyspnea No clear evidence of pneumonia No PE 
                     Decadron increased 10/5 to see if that improves symptoms 10/6,7,8 continues to  feel better' 
                         
  
*)  Pericardial effusion TTE no evidence of tampoanade 
  
*)  Anemia 
                      10.2 stable today  
 
) confusion. . Not sure if this is new , nursing and I remember her from a prior admit with similar affect. .neurology assistance appreciated, she is having an mri brain later today, suspect this is due to meds though,,, will d/c compazine,  But we can't stop the narcotics as her pain is significant. 
  
*)  DVT proph with lovenox 
 
) nausea, controlled, but stopping scheduled compazine today, will make reglan available prn 
 
) constipation. .. Last bm 2 days ago after enema. .. Continue laxatives/softners Ofelia Carranza MD 
 
.

## 2018-10-10 NOTE — PROGRESS NOTES
Hospitalist Progress Note Jason Esquivel MD 
Answering service: [phone] OR [phone] from in house phone Date of Service:  10/10/2018 NAME:  Wing Oconnell :  1955 MRN:  [id number] Admission Summary:  
65-year-old -American female with history of stage IV lung cancer with liver mass presented with dyspnea and increased oxygen requirement. She normally uses oxygen via nasal canula 1 l/min Interval history / Subjective:  
  
Awaiting on brain MRI, pain seems to be relatively well controlled. Continues to have intermittent confusion. Palliative care to see today. Pt would like to have shower chair for home. Assessment & Plan:  
 
Acute on chronic respiratory failure could be due to the progression of her lung cancer -PFTs indicated restrictive pattern  
-CTA lung is negative for PE. There are multiple lesions, several subsegmental bronchi obstructed by the tumor. A large liver mass is present 
-TTE with EF of 56% - no WMA, and no pericardial effusion - Undergoing chemotherapy per Heme 
- Oncology recs appreciated; ?ready for discharge soon - On decadron for dyspnea per oncology - Will need O2 on discharge AMS - waxing and waning, no h/o mets from last MRI in august.  
- Neuro consulted, MRI ordered pending. 
- Check thyroid labs  
  
Gastroesophageal reflux disease: Continue PPI. 
  
Normocytic anemia likely of chronic disease: Stable.  
  
Constipation: Improved, enema PRN  
  
Lupus. Not on any treatment currenlty. 
  
Shoulder pain: Try lidocaine patch. 
  
HTN - amlodipine, H/o HSV? - acyclovir Mild Hyponatremia. ?Related to her malignancy, continue to monitor 
  
Diet: Regular Code status: Full DVT prophylaxis: SCDs Care Plan discussed with: patient, daughter Disposition: Home w/Family, hopefully tomorrow Hospital Problems  Date Reviewed: 2018 Codes Class Noted POA Altered mental status ICD-10-CM: R41.82 
ICD-9-CM: 780.97  10/9/2018 Unknown * (Principal)Acute on chronic respiratory failure (HCC) ICD-10-CM: J96.20 ICD-9-CM: 518.84  10/4/2018 Yes Lung cancer [facility]) ICD-10-CM: C34.90 ICD-9-CM: 162.9  9/26/2018 Yes Shortness of breath ICD-10-CM: R06.02 
ICD-9-CM: 786.05  8/25/2018 Yes Review of Systems: A comprehensive review of systems was negative except for that written in the HPI. Vital Signs:  
 Last 24hrs VS reviewed since prior progress note. Most recent are: 
Visit Vitals  /83 (BP 1 Location: Left arm, BP Patient Position: At rest)  Pulse 89  Temp 99.4 °F (37.4 °C)  Resp 16  
 Ht 5' 2\" (1.575 m)  Wt 64 kg (141 lb 1.5 oz)  SpO2 100%  BMI 25.81 kg/m2 No intake or output data in the 24 hours ending 10/10/18 1826 Physical Examination:  
 
 
     
Constitutional:  No acute distress, cooperative, pleasant   
ENT:  Oral mucous moist, oropharynx benign. Neck supple, Resp:  CTA bilaterally. No wheezing/rhonchi/rales. No accessory muscle use. O2 in place CV:  Regular rhythm, normal rate, no murmurs, gallops, rubs GI:  Soft, non distended, non tender. normoactive bowel sounds, no hepatosplenomegaly Musculoskeletal:  No edema, warm, 2+ pulses throughout Neurologic:  Moves all extremities. AAOx3, CN II-XII reviewed Skin:  No rashes Data Review:  
 Review and/or order of clinical lab test 
Review and/or order of tests in the radiology section of CPT Review and/or order of tests in the medicine section of CPT Labs:  
 
Recent Labs 10/10/18 
 0303  10/09/18 
 0216 WBC  20.5*  17.9* HGB  10.2*  10.5* HCT  32.7*  34.2*  
PLT  516*  565* Recent Labs 10/10/18 
 0303  10/09/18 
 0216  10/08/18 
 0327 NA  130*  132*  134* K  4.2  4.4  4.2 CL  95*  96*  98  
CO2  27  28  27 BUN  17  16  16 CREA  0.97  0.99  0.96  
 GLU  161*  142*  134* CA  9.5  9.0  8.7 MG  2.4   --    --   
PHOS  3.2   --    -- No results for input(s): SGOT, GPT, ALT, AP, TBIL, TBILI, TP, ALB, GLOB, GGT, AML, LPSE in the last 72 hours. No lab exists for component: AMYP, HLPSE No results for input(s): INR, PTP, APTT in the last 72 hours. No lab exists for component: INREXT, INREXT No results for input(s): FE, TIBC, PSAT, FERR in the last 72 hours. No results found for: FOL, RBCF No results for input(s): PH, PCO2, PO2 in the last 72 hours. Recent Labs 10/10/18 
 0303  10/09/18 
 1857 TROIQ  <0.05  <0.05 No results found for: CHOL, CHOLX, CHLST, CHOLV, HDL, LDL, LDLC, DLDLP, TGLX, TRIGL, TRIGP, CHHD, CHHDX Lab Results Component Value Date/Time Glucose (POC) 119 (H) 08/31/2018 11:10 AM  
 Glucose (POC) 94 08/31/2018 06:40 AM  
 Glucose (POC) 136 (H) 08/30/2018 10:12 PM  
 Glucose (POC) 114 (H) 08/30/2018 04:15 PM  
 Glucose (POC) 133 (H) 08/30/2018 11:06 AM  
 
Lab Results Component Value Date/Time Color YELLOW/STRAW 10/04/2018 01:19 PM  
 Appearance CLEAR 10/04/2018 01:19 PM  
 Specific gravity 1.019 10/04/2018 01:19 PM  
 pH (UA) 6.5 10/04/2018 01:19 PM  
 Protein NEGATIVE  10/04/2018 01:19 PM  
 Glucose NEGATIVE  10/04/2018 01:19 PM  
 Ketone NEGATIVE  10/04/2018 01:19 PM  
 Bilirubin NEGATIVE  10/04/2018 01:19 PM  
 Urobilinogen 0.2 10/04/2018 01:19 PM  
 Nitrites NEGATIVE  10/04/2018 01:19 PM  
 Leukocyte Esterase NEGATIVE  10/04/2018 01:19 PM  
 Epithelial cells FEW 10/04/2018 01:19 PM  
 Bacteria NEGATIVE  10/04/2018 01:19 PM  
 WBC 0-4 10/04/2018 01:19 PM  
 RBC 0-5 10/04/2018 01:19 PM  
 
 
 
Medications Reviewed:  
 
Current Facility-Administered Medications Medication Dose Route Frequency  hydrALAZINE (APRESOLINE) 20 mg/mL injection 10 mg  10 mg IntraVENous Q6H PRN  
 metoclopramide HCl (REGLAN) injection 10 mg  10 mg IntraVENous Q6H PRN  mineral oil (FLEET) enema   Rectal PRN  
  lidocaine (LIDODERM) 5 % patch 1 Patch  1 Patch TransDERmal Q24H  
 docusate sodium (COLACE) capsule 100 mg  100 mg Oral BID  lactulose (CHRONULAC) solution 20 g  30 mL Oral BID  polyethylene glycol (MIRALAX) packet 17 g  17 g Oral BID  senna-docusate (PERICOLACE) 8.6-50 mg per tablet 2 Tab  2 Tab Oral DAILY  dexamethasone (DECADRON) tablet 8 mg  8 mg Oral Q12H  
 amLODIPine (NORVASC) tablet 10 mg  10 mg Oral DAILY  acyclovir (ZOVIRAX) tablet 400 mg  400 mg Oral BID  aspirin delayed-release tablet 81 mg  81 mg Oral DAILY  calcium-vitamin D (OS-KENIA) 500 mg-200 unit tablet  1 Tab Oral BID  folic acid (FOLVITE) tablet 1 mg  1 mg Oral DAILY  HYDROmorphone (DILAUDID) tablet 2 mg  2 mg Oral Q4H PRN  
 LORazepam (ATIVAN) tablet 2 mg  2 mg Oral Q8H PRN  mirtazapine (REMERON SOL-TAB) disintegrating tablet 15 mg  15 mg Oral QHS  oxyCODONE ER (OxyCONTIN) tablet 20 mg  20 mg Oral Q12H  furosemide (LASIX) injection 40 mg  40 mg IntraVENous DAILY  sodium chloride (NS) flush 5-10 mL  5-10 mL IntraVENous Q8H  
 sodium chloride (NS) flush 5-10 mL  5-10 mL IntraVENous PRN  
 naloxone (NARCAN) injection 0.4 mg  0.4 mg IntraVENous PRN  
 enoxaparin (LOVENOX) injection 40 mg  40 mg SubCUTAneous Q24H  
 acetaminophen (TYLENOL) tablet 650 mg  650 mg Oral Q4H PRN  
 ondansetron (ZOFRAN) injection 4 mg  4 mg IntraVENous Q4H PRN  
 
______________________________________________________________________ EXPECTED LENGTH OF STAY: 3d 19h ACTUAL LENGTH OF STAY:          6 
 
            
Jonna Barone MD

## 2018-10-10 NOTE — PROGRESS NOTES
PT Note: 
 
Chart reviewed and attempted to see for PT treatment. Patient sleeping upon arrival with family present. Family states patient \"had a bad night and was up earlier. \"  Requesting to allow patient to rest.  Family agreeable to encourage patient to be OOB to chair and ambulating to restroom and out in hallway with assistance. Will continue to follow for progression of mobility.

## 2018-10-10 NOTE — PROGRESS NOTES
Bedside shift change report given to Sara Shore (oncoming nurse) by Everardo Bello (offgoing nurse). Report included the following information SBAR.

## 2018-10-10 NOTE — PROGRESS NOTES
Spiritual Care Assessment/Progress Note [address] 
 
 
NAME: Celestina Wade      MRN: [id number] AGE: 58 y.o. SEX: female Jainism Affiliation: Synagogue  
Language: English  
 
10/10/2018     Total Time (in minutes): 18 Spiritual Assessment begun in [facility] through conversation with: 
  
    [x]Patient        [x] Family    [] Friend(s) Reason for Consult: Palliative Care, Initial/Spiritual Assessment Spiritual beliefs: (Please include comment if needed) [x] Identifies with a elly tradition:     
   [x] Supported by a elly community:        
   [] Claims no spiritual orientation:       
   [] Seeking spiritual identity:            
   [] Adheres to an individual form of spirituality:       
   [] Not able to assess:                   
 
    
Identified resources for coping:  
   [x] Prayer                           
   [] Music                  [] Guided Imagery [x] Family/friends                 [] Pet visits [] Devotional reading                         [] Unknown 
   [] Other:                                       
 
 
Interventions offered during this visit: (See comments for more details) Patient Interventions: Affirmation of elly, Guided imagery, Normalization of emotional/spiritual concerns, Prayer (assurance of), Prayer (actual) Plan of Care: 
 
 [x] Support spiritual and/or cultural needs  
 [] Support AMD and/or advance care planning process    
 [] Support grieving process 
 [] Coordinate Rites and/or Rituals  
 [] Coordination with community clergy [] No spiritual needs identified at this time 
 [] Detailed Plan of Care below (See Comments)  [] Make referral to Music Therapy 
[] Make referral to Pet Therapy    
[] Make referral to Addiction services 
[] Make referral to [facility] 
[] Make referral to Spiritual Care Partner 
[] No future visits requested       
[x] Follow up visits as needed Comments: Initial spiritual assessment in Room 619/01. Patient was laying in bed not feeling well,family  At her beside. Patient shared a little about her medical issue. Provided listen comfort and prayed with patient and family. Advised of  Availability. Rev. Arlin Alpers. Lloyd Cody

## 2018-10-10 NOTE — CONSULTS
Palliative Medicine Consult  [place]: [phone] ([phone])    Patient Name: Richmond Rivera  YOB: 1955    Date of Initial Consult: 10-10-18  Reason for Consult: Care goals  Requesting Provider: [facility]- hospitalist team  Primary Care Physician: Roc Ho MD     SUMMARY:   Richmond Rivera is a 58 y.o. with a past history of nonsmall cell lung cancer on home O2 w/ mets to liver and bone followed by Dr Deonna Nichols on active chemo, GERD, lupus, HTN who was admitted on 10/4/2018 from home w/ worsening SOB. Has had some confusion that has started this week, neurology consulted. Started on steroids to see if will help SOB. Right now chemo on hold. Goals of care discussions held w/ primary team and oncology- goals are for all treatments as possible and full code status. Current medical issues leading to Palliative Medicine involvement include: care decisions. Social:  to [place], he is retired and is her caregiver. They have one dtr who lives in [facility] and is a big support. PALLIATIVE DIAGNOSES:   1. Confusion, slowed thinking, MRI brain pending  2. Fatigue  3. Shortness of breath  4. R shoulder pain- incomplete R rotator cuff tear, possible radiation from liver mets, has R scapula mets       PLAN:   1. Pt is eating lunch. Her , dtr, brother, and niece are at bedside. They were aware of consult, had not heard of palliative medicine before- so explain the service, both inpatient and outpatient. 2. Goals of care are clear- they have had conversations with attending and oncology and very politely decline further discussion this admission, which is understandable. 3. They are open to talking about sx management. Pt is on Oxycontin 20mg bid and Dilaudid 2mg po every 4h prn at home and here. Confusion started this past week, so may not be related to opioids alone but agree might be contributing. Pt tried lidoderm patch this stay w/out benefit.    4. Pain: Main sight of pain is R shoulder and not in scapula so much where there is disease. Has a partial RC tear- has seen [facility] [place] who recommended conservative measures. Discussion that could be radiating up from liver mets. Started on steroids this admission which has helped breathing, but not pain. 5. Cont current regimen. May benefit from acupuncture to help pain and decr opioid usage if concern that incr confusion. 6. On bowel regimen. 7. Follow up: Pt sees Dr Shruthi Swenson at the [address] [facility] [address] office. Explain that sometimes our clinic can help w/ extra support and sx, but that we also understand it is an extra visit. 8. Family to talk- we would be happy to see and I could also do acupuncture session on her (of note, I only see patients w/ active cancer). I will see her tmrw, and family can call our office to make an appt if they wish- I will alert OP team.  9. Initial consult note routed to primary continuity provider  10. Communicated plan of care with: Palliative IDT; Mily Fong RN       GOALS OF CARE / TREATMENT PREFERENCES:     GOALS OF CARE:  Patient/Health Care Proxy Stated Goals: Prolong life      TREATMENT PREFERENCES:   Code Status: Full Code    Advance Care Planning:  Advance Care Planning 10/4/2018   Patient's Healthcare Decision Maker is: Verbal statement (Legal Next of Kin remains as decision maker)   Primary Decision Maker Name Tali Dove   Primary Decision Maker Phone Number [phone]   Primary Decision Maker Relationship to Patient Spouse   Confirm Advance Directive Yes, not on file   Does the patient have other document types -       Medical Interventions: Full interventions   Other Instructions: Other:    As far as possible, the palliative care team has discussed with patient / health care proxy about goals of care / treatment preferences for patient.            HISTORY:     History obtained from: Pt, family, chart, staff    CHIEF COMPLAINT: fatigue, shoulder pain     HPI/SUBJECTIVE:    The patient is: [x] Verbal and participatory  [] Non-participatory due to:     Pt in NAD, sitting up and being fed lunch by her . Can answer questions, but very slowly and as she is eating, defers a lot to family. Pain in R shoulder, not in scapular. Saw Ortho this summer. No abdominal pain. Some constipation, last BM 2 days ago. Clinical Pain Assessment (nonverbal scale for severity on nonverbal patients):   Clinical Pain Assessment  Severity: 3  Location: R shoulder   Character: aching   Duration: months  Effect: painful  Factors: worse w/ palpation  Frequency: constant          Duration: for how long has pt been experiencing pain (e.g., 2 days, 1 month, years)  Frequency: how often pain is an issue (e.g., several times per day, once every few days, constant)     FUNCTIONAL ASSESSMENT:     Palliative Performance Scale (PPS):  PPS: 50       PSYCHOSOCIAL/SPIRITUAL SCREENING:     Palliative IDT has assessed this patient for cultural preferences / practices and a referral made as appropriate to needs (Cultural Services, Patient Advocacy, Ethics, etc.)    Advance Care Planning:  Advance Care Planning 10/4/2018   Patient's Healthcare Decision Maker is: Verbal statement (Legal Next of Kin remains as decision maker)   Primary Decision Maker Name Leonardo Mosqueda   Primary Decision Maker Phone Number [phone]   Primary Decision Maker Relationship to Patient Spouse   Confirm Advance Directive Yes, not on file   Does the patient have other document types -       Any spiritual / Muslim concerns:  [] Yes /  [x] No    Caregiver Burnout:  [] Yes /  [x] No /  [] No Caregiver Present      Anticipatory grief assessment:   [x] Normal  / [] Maladaptive       ESAS Anxiety: Anxiety: 0    ESAS Depression: Depression: 0        REVIEW OF SYSTEMS:     Positive and pertinent negative findings in ROS are noted above in HPI.   The following systems were [x] reviewed / [] unable to be reviewed as noted in HPI  Other findings are noted below.  Systems: constitutional, ears/nose/mouth/throat, respiratory, gastrointestinal, genitourinary, musculoskeletal, integumentary, neurologic, psychiatric, endocrine. Positive findings noted below. Modified ESAS Completed by: provider   Fatigue: 5 Drowsiness: 2   Depression: 0 Pain: 3   Anxiety: 0 Nausea: 3   Anorexia: 0 Dyspnea: 1     Constipation: No              PHYSICAL EXAM:     From RN flowsheet:  Wt Readings from Last 3 Encounters:   10/09/18 141 lb 1.5 oz (64 kg)   10/10/18 141 lb (64 kg)   18 147 lb 8 oz (66.9 kg)     Blood pressure 152/83, pulse 89, temperature 99.4 °F (37.4 °C), resp. rate 16, height 5' 2\" (1.575 m), weight 141 lb 1.5 oz (64 kg), SpO2 100 %. Pain Scale 1: Numeric (0 - 10)  Pain Intensity 1: 0  Pain Onset 1: chronic  Pain Location 1: Back  Pain Orientation 1: Posterior  Pain Description 1: Aching  Pain Intervention(s) 1: Medication (see MAR)  Last bowel movement, if known:     Constitutional: awake, fatigued, alert  Eyes: pupils equal, anicteric  ENMT: no nasal discharge, moist mucous membranes  Cardiovascular: regular rhythm  Respiratory: breathing not labored, O2 NC  Gastrointestinal: NTTP  Musculoskeletal: no deformity, TTP over supraspinatus R shoulder, deferred ROM as eating  Skin: warm, dry  Neurologic: following commands, moving all extremities  Psychiatric: flat      HISTORY:     Principal Problem:    Acute on chronic respiratory failure (HCC) (10/4/2018)    Active Problems:    Shortness of breath (2018)      Lung cancer ([facility] [address] (2018)      Altered mental status (10/9/2018)      Past Medical History:   Diagnosis Date    Arthritis     Autoimmune disease ([facility] [address]     lupus    Cancer ([facility] [address]     lung cancer    Gastrointestinal disorder     GERD    Hypertension       Past Surgical History:   Procedure Laterality Date    HX CARPAL TUNNEL RELEASE      HX  SECTION      HX HYSTERECTOMY        History reviewed. No pertinent family history.    History reviewed, no pertinent family history.   Social History   Substance Use Topics    Smoking status: Never Smoker    Smokeless tobacco: Never Used    Alcohol use No     No Known Allergies   Current Facility-Administered Medications   Medication Dose Route Frequency    hydrALAZINE (APRESOLINE) 20 mg/mL injection 10 mg  10 mg IntraVENous Q6H PRN    metoclopramide HCl (REGLAN) injection 10 mg  10 mg IntraVENous Q6H PRN    mineral oil (FLEET) enema   Rectal PRN    lidocaine (LIDODERM) 5 % patch 1 Patch  1 Patch TransDERmal Q24H    docusate sodium (COLACE) capsule 100 mg  100 mg Oral BID    lactulose (CHRONULAC) solution 20 g  30 mL Oral BID    polyethylene glycol (MIRALAX) packet 17 g  17 g Oral BID    senna-docusate (PERICOLACE) 8.6-50 mg per tablet 2 Tab  2 Tab Oral DAILY    dexamethasone (DECADRON) tablet 8 mg  8 mg Oral Q12H    amLODIPine (NORVASC) tablet 10 mg  10 mg Oral DAILY    acyclovir (ZOVIRAX) tablet 400 mg  400 mg Oral BID    aspirin delayed-release tablet 81 mg  81 mg Oral DAILY    calcium-vitamin D (OS-KENIA) 500 mg-200 unit tablet  1 Tab Oral BID    folic acid (FOLVITE) tablet 1 mg  1 mg Oral DAILY    HYDROmorphone (DILAUDID) tablet 2 mg  2 mg Oral Q4H PRN    LORazepam (ATIVAN) tablet 2 mg  2 mg Oral Q8H PRN    mirtazapine (REMERON SOL-TAB) disintegrating tablet 15 mg  15 mg Oral QHS    oxyCODONE ER (OxyCONTIN) tablet 20 mg  20 mg Oral Q12H    furosemide (LASIX) injection 40 mg  40 mg IntraVENous DAILY    sodium chloride (NS) flush 5-10 mL  5-10 mL IntraVENous Q8H    sodium chloride (NS) flush 5-10 mL  5-10 mL IntraVENous PRN    naloxone (NARCAN) injection 0.4 mg  0.4 mg IntraVENous PRN    enoxaparin (LOVENOX) injection 40 mg  40 mg SubCUTAneous Q24H    acetaminophen (TYLENOL) tablet 650 mg  650 mg Oral Q4H PRN    ondansetron (ZOFRAN) injection 4 mg  4 mg IntraVENous Q4H PRN          LAB AND IMAGING FINDINGS:     Lab Results   Component Value Date/Time    WBC 20.5 (H) 10/10/2018 03:03 AM    HGB 10.2 (L) 10/10/2018 03:03 AM    PLATELET 863 (H) 52/99/3671 03:03 AM     Lab Results   Component Value Date/Time    Sodium 130 (L) 10/10/2018 03:03 AM    Potassium 4.2 10/10/2018 03:03 AM    Chloride 95 (L) 10/10/2018 03:03 AM    CO2 27 10/10/2018 03:03 AM    BUN 17 10/10/2018 03:03 AM    Creatinine 0.97 10/10/2018 03:03 AM    Calcium 9.5 10/10/2018 03:03 AM    Magnesium 2.4 10/10/2018 03:03 AM    Phosphorus 3.2 10/10/2018 03:03 AM      Lab Results   Component Value Date/Time    AST (SGOT) 52 (H) 10/05/2018 04:30 AM    Alk. phosphatase 122 (H) 10/05/2018 04:30 AM    Protein, total 7.2 10/05/2018 04:30 AM    Albumin 1.9 (L) 10/05/2018 04:30 AM    Globulin 5.3 (H) 10/05/2018 04:30 AM     No results found for: INR, PTMR, PTP, PT1, PT2, APTT   Lab Results   Component Value Date/Time    Iron 47 08/30/2018 11:20 AM    TIBC 186 (L) 08/30/2018 11:20 AM    Iron % saturation 25 08/30/2018 11:20 AM    Ferritin 175 08/30/2018 11:20 AM      No results found for: PH, PCO2, PO2  No components found for: GLPOC   No results found for: CPK, CKMB             Total time: 70 min   Counseling / coordination time, spent as noted above: 45 min   > 50% counseling / coordination?: yes    Prolonged service was provided for  []30 min   []75 min in face to face time in the presence of the patient, spent as noted above. Time Start:   Time End:   Note: this can only be billed with 65048 (initial) or 74259 (follow up). If multiple start / stop times, list each separately.

## 2018-10-11 VITALS
TEMPERATURE: 96.9 F | HEART RATE: 95 BPM | OXYGEN SATURATION: 99 % | BODY MASS INDEX: 25.96 KG/M2 | RESPIRATION RATE: 18 BRPM | SYSTOLIC BLOOD PRESSURE: 118 MMHG | DIASTOLIC BLOOD PRESSURE: 81 MMHG | HEIGHT: 62 IN | WEIGHT: 141.09 LBS

## 2018-10-11 LAB
ANION GAP SERPL CALC-SCNC: 11 MMOL/L (ref 5–15)
BASOPHILS # BLD: 0 K/UL (ref 0–0.1)
BASOPHILS NFR BLD: 0 % (ref 0–1)
BUN SERPL-MCNC: 19 MG/DL (ref 6–20)
BUN/CREAT SERPL: 21 (ref 12–20)
CALCIUM SERPL-MCNC: 8.6 MG/DL (ref 8.5–10.1)
CHLORIDE SERPL-SCNC: 95 MMOL/L (ref 97–108)
CO2 SERPL-SCNC: 27 MMOL/L (ref 21–32)
CREAT SERPL-MCNC: 0.92 MG/DL (ref 0.55–1.02)
DIFFERENTIAL METHOD BLD: ABNORMAL
EOSINOPHIL # BLD: 0 K/UL (ref 0–0.4)
EOSINOPHIL NFR BLD: 0 % (ref 0–7)
ERYTHROCYTE [DISTWIDTH] IN BLOOD BY AUTOMATED COUNT: 25.5 % (ref 11.5–14.5)
GLUCOSE SERPL-MCNC: 141 MG/DL (ref 65–100)
HCT VFR BLD AUTO: 31 % (ref 35–47)
HGB BLD-MCNC: 9.6 G/DL (ref 11.5–16)
IMM GRANULOCYTES # BLD: 0 K/UL
IMM GRANULOCYTES NFR BLD AUTO: 0 %
LYMPHOCYTES # BLD: 0.2 K/UL (ref 0.8–3.5)
LYMPHOCYTES NFR BLD: 1 % (ref 12–49)
MAGNESIUM SERPL-MCNC: 2.2 MG/DL (ref 1.6–2.4)
MCH RBC QN AUTO: 25.8 PG (ref 26–34)
MCHC RBC AUTO-ENTMCNC: 31 G/DL (ref 30–36.5)
MCV RBC AUTO: 83.3 FL (ref 80–99)
METAMYELOCYTES NFR BLD MANUAL: 4 %
MONOCYTES # BLD: 2.2 K/UL (ref 0–1)
MONOCYTES NFR BLD: 10 % (ref 5–13)
MYELOCYTES NFR BLD MANUAL: 2 %
NEUTS BAND NFR BLD MANUAL: 3 % (ref 0–6)
NEUTS SEG # BLD: 18.2 K/UL (ref 1.8–8)
NEUTS SEG NFR BLD: 80 % (ref 32–75)
NRBC # BLD: 0.08 K/UL (ref 0–0.01)
NRBC BLD-RTO: 0.4 PER 100 WBC
PHOSPHATE SERPL-MCNC: 3.4 MG/DL (ref 2.6–4.7)
PLATELET # BLD AUTO: 399 K/UL (ref 150–400)
PMV BLD AUTO: 9.9 FL (ref 8.9–12.9)
POTASSIUM SERPL-SCNC: 4.1 MMOL/L (ref 3.5–5.1)
RBC # BLD AUTO: 3.72 M/UL (ref 3.8–5.2)
RBC MORPH BLD: ABNORMAL
SODIUM SERPL-SCNC: 133 MMOL/L (ref 136–145)
WBC # BLD AUTO: 21.9 K/UL (ref 3.6–11)

## 2018-10-11 PROCEDURE — 83735 ASSAY OF MAGNESIUM: CPT | Performed by: INTERNAL MEDICINE

## 2018-10-11 PROCEDURE — 36415 COLL VENOUS BLD VENIPUNCTURE: CPT | Performed by: INTERNAL MEDICINE

## 2018-10-11 PROCEDURE — 74011250637 HC RX REV CODE- 250/637: Performed by: HOSPITALIST

## 2018-10-11 PROCEDURE — 74011000250 HC RX REV CODE- 250: Performed by: HOSPITALIST

## 2018-10-11 PROCEDURE — 84100 ASSAY OF PHOSPHORUS: CPT | Performed by: INTERNAL MEDICINE

## 2018-10-11 PROCEDURE — 74011250636 HC RX REV CODE- 250/636: Performed by: INTERNAL MEDICINE

## 2018-10-11 PROCEDURE — 74011250636 HC RX REV CODE- 250/636: Performed by: HOSPITALIST

## 2018-10-11 PROCEDURE — 85025 COMPLETE CBC W/AUTO DIFF WBC: CPT | Performed by: INTERNAL MEDICINE

## 2018-10-11 PROCEDURE — 77010033678 HC OXYGEN DAILY

## 2018-10-11 PROCEDURE — 80048 BASIC METABOLIC PNL TOTAL CA: CPT | Performed by: INTERNAL MEDICINE

## 2018-10-11 RX ORDER — METOCLOPRAMIDE 5 MG/1
5 TABLET ORAL
Qty: 60 TAB | Refills: 0 | Status: SHIPPED | OUTPATIENT
Start: 2018-10-11 | End: 2018-11-10

## 2018-10-11 RX ORDER — OXYCODONE HCL 20 MG/1
20 TABLET, FILM COATED, EXTENDED RELEASE ORAL EVERY 12 HOURS
Qty: 14 TAB | Refills: 0 | Status: SHIPPED | OUTPATIENT
Start: 2018-10-11 | End: 2018-10-18

## 2018-10-11 RX ORDER — HYDROMORPHONE HYDROCHLORIDE 2 MG/1
2 TABLET ORAL
Qty: 20 TAB | Refills: 0 | Status: SHIPPED | OUTPATIENT
Start: 2018-10-11 | End: 2018-10-18

## 2018-10-11 RX ORDER — DEXAMETHASONE 4 MG/1
8 TABLET ORAL EVERY 12 HOURS
Qty: 56 TAB | Refills: 0 | Status: SHIPPED | OUTPATIENT
Start: 2018-10-11 | End: 2018-10-25

## 2018-10-11 RX ORDER — METOCLOPRAMIDE HYDROCHLORIDE 5 MG/ML
10 INJECTION INTRAMUSCULAR; INTRAVENOUS EVERY 6 HOURS
Status: DISCONTINUED | OUTPATIENT
Start: 2018-10-11 | End: 2018-10-11 | Stop reason: HOSPADM

## 2018-10-11 RX ORDER — POLYETHYLENE GLYCOL 3350 17 G/17G
17 POWDER, FOR SOLUTION ORAL 2 TIMES DAILY
Qty: 28 PACKET | Refills: 0 | Status: SHIPPED | OUTPATIENT
Start: 2018-10-11 | End: 2018-10-25

## 2018-10-11 RX ORDER — AMOXICILLIN 250 MG
2 CAPSULE ORAL DAILY
Qty: 28 TAB | Refills: 0 | Status: SHIPPED | OUTPATIENT
Start: 2018-10-11 | End: 2018-10-25

## 2018-10-11 RX ORDER — ONDANSETRON HYDROCHLORIDE 8 MG/1
8 TABLET, FILM COATED ORAL
Qty: 30 TAB | Refills: 0 | Status: SHIPPED | OUTPATIENT
Start: 2018-10-11

## 2018-10-11 RX ORDER — NALOXONE HYDROCHLORIDE 4 MG/.1ML
SPRAY NASAL
Qty: 1 EACH | Refills: 0 | Status: SHIPPED | OUTPATIENT
Start: 2018-10-11

## 2018-10-11 RX ADMIN — ACYCLOVIR 400 MG: 800 TABLET ORAL at 10:02

## 2018-10-11 RX ADMIN — ONDANSETRON HYDROCHLORIDE 4 MG: 2 INJECTION INTRAMUSCULAR; INTRAVENOUS at 09:44

## 2018-10-11 RX ADMIN — Medication 10 ML: at 03:00

## 2018-10-11 RX ADMIN — METOCLOPRAMIDE 10 MG: 5 INJECTION, SOLUTION INTRAMUSCULAR; INTRAVENOUS at 12:09

## 2018-10-11 RX ADMIN — OXYCODONE HYDROCHLORIDE 20 MG: 20 TABLET, FILM COATED, EXTENDED RELEASE ORAL at 06:40

## 2018-10-11 RX ADMIN — SENNOSIDES AND DOCUSATE SODIUM 2 TABLET: 8.6; 5 TABLET ORAL at 10:01

## 2018-10-11 RX ADMIN — DEXAMETHASONE 8 MG: 4 TABLET ORAL at 10:01

## 2018-10-11 RX ADMIN — Medication 10 ML: at 05:20

## 2018-10-11 RX ADMIN — LACTULOSE 20 G: 20 SOLUTION ORAL at 10:03

## 2018-10-11 RX ADMIN — METOCLOPRAMIDE 10 MG: 5 INJECTION, SOLUTION INTRAMUSCULAR; INTRAVENOUS at 05:19

## 2018-10-11 RX ADMIN — ONDANSETRON HYDROCHLORIDE 4 MG: 2 INJECTION INTRAMUSCULAR; INTRAVENOUS at 03:00

## 2018-10-11 RX ADMIN — Medication 10 ML: at 09:44

## 2018-10-11 RX ADMIN — DOCUSATE SODIUM 100 MG: 100 CAPSULE, LIQUID FILLED ORAL at 10:02

## 2018-10-11 RX ADMIN — POLYETHYLENE GLYCOL 3350 17 G: 17 POWDER, FOR SOLUTION ORAL at 10:03

## 2018-10-11 RX ADMIN — FOLIC ACID 1 MG: 1 TABLET ORAL at 10:01

## 2018-10-11 RX ADMIN — AMLODIPINE BESYLATE 10 MG: 5 TABLET ORAL at 10:00

## 2018-10-11 RX ADMIN — FUROSEMIDE 40 MG: 10 INJECTION, SOLUTION INTRAMUSCULAR; INTRAVENOUS at 10:04

## 2018-10-11 RX ADMIN — ASPIRIN 81 MG: 81 TABLET, COATED ORAL at 10:02

## 2018-10-11 RX ADMIN — CALCIUM CARBONATE-VITAMIN D TAB 500 MG-200 UNIT 1 TABLET: 500-200 TAB at 10:00

## 2018-10-11 NOTE — DISCHARGE SUMMARY
Discharge Summary       PATIENT ID: Pb Fraias  MRN: [id number]   YOB: 1955    DATE OF ADMISSION: 10/4/2018 10:37 AM    DATE OF DISCHARGE: 10/11/2018  PRIMARY CARE PROVIDER: John Bliss MD     ATTENDING PHYSICIAN: Harini Shook MD  DISCHARGING PROVIDER: Harini Shook MD    To contact this individual call [phone] and ask the  to page. If unavailable ask to be transferred the Adult Hospitalist Department. CONSULTATIONS: IP CONSULT TO HOSPITALIST  IP CONSULT TO ONCOLOGY  IP CONSULT TO PALLIATIVE CARE - PROVIDER  IP CONSULT TO NEUROLOGY    PROCEDURES/SURGERIES: * No surgery found *    ADMITTING DIAGNOSES & HOSPITAL COURSE:   Acute on chronic hypoxic respiratory failure - secondary to cancer progression  Malignancy related pain  Malignancy related nausea  AMS - ? Chemotherapy related        DISCHARGE DIAGNOSES / PLAN:      Acute on chronic respiratory failure could be due to the progression of her lung cancer  -PFTs indicated restrictive pattern   -CTA lung is negative for PE. There are multiple lesions, several subsegmental bronchi obstructed by the tumor. A large liver mass is present  -TTE with EF of 56% - no WMA, and no pericardial effusion  - Undergoing chemotherapy per Onc, to follow up outpatient after discharge. - Oncology recs appreciated  - On decadron for dyspnea per oncology, also helping with nasuea. - O2 for comfort, patient already has this at home.     AMS - waxing and waning, no h/o mets from last MRI in august.   - Neuro consulted, MRI repeated 10/10 and with only microvascular changes.      Malignancy related pain - oxycontin 20mg Q12h, dilaudid PRN - patient provided with 7 days of prescritpion in case she has run out at home.   - Family understands ONLY to fill this if she does not have the pain medication at home already.   - Narcan prescription provided as well     Malignancy related nausea  - Decadron continued, had been taking at home  - Zofran, Compazine PRN, Reglan PRN (told not to take both together)    Gastroesophageal reflux disease: Continue PPI.      Normocytic anemia likely of chronic disease: Stable.       Constipation: Improved, enema PRN, lactulose PRN       Lupus. Not on any treatment currenlty.      Shoulder pain: Pain regimen as above, lidocaine patch without improvement. HTN - amlodipine,      H/o HSV? - acyclovir      Mild Hyponatremia - stable. ?Related to her malignancy, continue to monitor      Diet: Regular  Code status: Full  DVT prophylaxis: SCDs  Care Plan discussed with: patient, daughter  Dispo: home with family        PENDING TEST RESULTS:   At the time of discharge the following test results are still pending: None    FOLLOW UP APPOINTMENTS:    Follow-up Information     Follow up With Details Comments Contact Info    Tanmay eHndrix MD In 1 week  [address] [facility] [address]  [address] [place] [address]      Harini Fong MD In 1 week Please call if acupunture is desired. [address] [facility]  [phone]      Gillian Lin MD In 1 week Hospital follow up  [address] [place] [address]  [phone]             ADDITIONAL CARE RECOMMENDATIONS: See depart summary instructions. DIET: Regular Diet  ACTIVITY: Activity as tolerated  Equipment: shower chair provided    DISCHARGE MEDICATIONS:  Current Discharge Medication List      START taking these medications    Details   lactulose (CHRONULAC) 10 gram/15 mL solution Take 30 mL by mouth two (2) times a day for 14 days. Qty: 840 mL, Refills: 0      polyethylene glycol (MIRALAX) 17 gram packet Take 1 Packet by mouth two (2) times a day for 14 days. Qty: 28 Packet, Refills: 0      senna-docusate (PERICOLACE) 8.6-50 mg per tablet Take 2 Tabs by mouth daily for 14 days.   Qty: 28 Tab, Refills: 0      metoclopramide HCl (REGLAN) 5 mg tablet Take 1 Tab by mouth three (3) times daily as needed for up to 30 days. Qty: 60 Tab, Refills: 0      naloxone (NARCAN) 4 mg/actuation nasal spray Use 1 spray intranasally, then discard. Repeat with new spray every 2 min as needed for opioid overdose symptoms, alternating nostrils. Qty: 1 Each, Refills: 0         CONTINUE these medications which have CHANGED    Details   HYDROmorphone (DILAUDID) 2 mg tablet Take 1 Tab by mouth every four (4) hours as needed for up to 7 days. Max Daily Amount: 12 mg.  Qty: 20 Tab, Refills: 0    Associated Diagnoses: Neoplastic malignant related fatigue      ondansetron hcl (ZOFRAN) 8 mg tablet Take 1 Tab by mouth every eight (8) hours as needed for Nausea. Indications: CANCER CHEMOTHERAPY-INDUCED NAUSEA AND VOMITING  Qty: 30 Tab, Refills: 0      oxyCODONE ER (OXYCONTIN) 20 mg ER tablet Take 1 Tab by mouth every twelve (12) hours for 7 days. Max Daily Amount: 40 mg. Indications: Chronic Pain  Qty: 14 Tab, Refills: 0    Associated Diagnoses: Neoplastic malignant related fatigue      dexamethasone (DECADRON) 4 mg tablet Take 2 Tabs by mouth every twelve (12) hours for 14 days. Per chemo regimen (day before, day of, day after)  Qty: 56 Tab, Refills: 0         CONTINUE these medications which have NOT CHANGED    Details   epoetin karla (PROCRIT) 20,000 unit/mL injection 20,000 Units by SubCUTAneous route every seven (7) days. Indications: CHEMOTHERAPY-INDUCED ANEMIA      LORazepam (ATIVAN) 2 mg tablet Take 2 mg by mouth every eight (8) hours as needed for Anxiety. furosemide (LASIX) 20 mg tablet Take 20 mg by mouth daily as needed. Indications: Edema      prochlorperazine (COMPAZINE) 10 mg tablet Take 5 mg by mouth every six (6) hours as needed for Nausea. acetaminophen (TYLENOL) 325 mg tablet Take 2 Tabs by mouth every six (6) hours as needed. Indications: Headache Disorder, Pain  Qty: 30 Tab, Refills: 0      amLODIPine (NORVASC) 10 mg tablet Take 1 Tab by mouth daily.  Indications: hypertension  Qty: 30 Tab, Refills: 2      mirtazapine (REMERON SOL-TAB) 15 mg disintegrating tablet Take 1 Tab by mouth nightly. Qty: 30 Tab, Refills: 0      acyclovir (ZOVIRAX) 400 mg tablet Take 400 mg by mouth two (2) times a day. folic acid (FOLVITE) 1 mg tablet Take 1 mg by mouth daily. triamcinolone (KENALOG) 0.1 % lotion Apply  to affected area two (2) times a day. use thin layer      aspirin delayed-release 81 mg tablet Take 81 mg by mouth daily. calcium citrate-vitamin D3 (CITRACAL + D) tablet Take 1 Tab by mouth two (2) times a day. multivitamin (ONE A DAY) tablet Take 1 Tab by mouth daily. STOP taking these medications       bevacizumab (AVASTIN IV) Comments:   Reason for Stopping:         pemetrexed disodium (ALIMTA IV) Comments:   Reason for Stopping:                 NOTIFY YOUR PHYSICIAN FOR ANY OF THE FOLLOWING:   Fever over 101 degrees for 24 hours. Chest pain, shortness of breath, fever, chills, nausea, vomiting, diarrhea, change in mentation, falling, weakness, bleeding. Severe pain or pain not relieved by medications. Or, any other signs or symptoms that you may have questions about. DISPOSITION:   X Home With:   OT  PT  HH  RN       Long term SNF/Inpatient Rehab    Independent/assisted living    Hospice    Other:       PATIENT CONDITION AT DISCHARGE:     Functional status    Poor    X Deconditioned     Independent      Cognition   X  Lucid     Forgetful     Dementia      Catheters/lines (plus indication)    Edgar     PICC     PEG    X None      Code status   X  Full code     DNR      PHYSICAL EXAMINATION AT DISCHARGE:  Visit Vitals    /86    Pulse 88    Temp 98.9 °F (37.2 °C)    Resp 18    Ht 5' 2\" (1.575 m)    Wt 64 kg (141 lb 1.5 oz)    SpO2 100%    BMI 25.81 kg/m2     Constitutional:  No acute distress, cooperative, pleasant, drowsy but arousable   ENT:  Oral mucous moist, oropharynx benign. Neck supple,    Resp:  CTA bilaterally. No wheezing/rhonchi/rales.  No accessory muscle use. O2 in place   CV:  Regular rhythm, normal rate, no murmurs, gallops, rubs    GI:  Soft, non distended, non tender. normoactive bowel sounds, no hepatosplenomegaly     Musculoskeletal:  No edema, warm, 2+ pulses throughout    Neurologic:  Moves all extremities.   AAOx3, CN II-XII reviewed                                             Skin:  No rashes             CHRONIC MEDICAL DIAGNOSES:  Problem List as of 10/11/2018  Date Reviewed: 10/11/2018          Codes Class Noted - Resolved    Altered mental status ICD-10-CM: R41.82  ICD-9-CM: 780.97  10/9/2018 - Present        * (Principal)Acute on chronic respiratory failure ([facility].) ICD-10-CM: J96.20  ICD-9-CM: 518.84  10/4/2018 - Present        Lung cancer ([facility].) ICD-10-CM: C34.90  ICD-9-CM: 162.9  9/26/2018 - Present        Non-pressure chronic ulcer of buttock ([facility].) ICD-10-CM: L98.419  ICD-9-CM: 707.8  9/26/2018 - Present        Shortness of breath ICD-10-CM: R06.02  ICD-9-CM: 786.05  8/25/2018 - Present              Greater than 30 minutes were spent with the patient on counseling and coordination of care    Signed:   Guzman Gayle MD  10/11/2018  8:30 AM

## 2018-10-11 NOTE — PROGRESS NOTES
Problem: Falls - Risk of 
Goal: *Absence of Falls Document Liz Burrell Fall Risk and appropriate interventions in the flowsheet. Outcome: Progressing Towards Goal 
Fall Risk Interventions: 
  
 
  
 
Medication Interventions: Patient to call before getting OOB Elimination Interventions: Call light in reach Problem: Pressure Injury - Risk of 
Goal: *Prevention of pressure injury Document Arthur Scale and appropriate interventions in the flowsheet. Offload heels Turn approximately every 2 hours Outcome: Progressing Towards Goal 
Pressure Injury Interventions: 
Sensory Interventions: Assess changes in LOC, Float heels, Keep linens dry and wrinkle-free, Check visual cues for pain Moisture Interventions: Absorbent underpads, Apply protective barrier, creams and emollients Activity Interventions: Increase time out of bed, Pressure redistribution bed/mattress(bed type) Mobility Interventions: Pressure redistribution bed/mattress (bed type), Float heels Nutrition Interventions: Document food/fluid/supplement intake, Offer support with meals,snacks and hydration Friction and Shear Interventions: Lift sheet Encourage family to get pt, and encouraging pt herself, to keep turning and moving to get the pressure off her buttocks.

## 2018-10-11 NOTE — PROGRESS NOTES
Bedside and Verbal shift change report given to Jessie Fiore and [facility], RN (oncoming nurse) by Michael Peguero RN (offgoing nurse). Report included the following information SBAR, Kardex and MAR. Pt is resting with no distress noted family and  are at bedside. Pt sent down for MRI at present time. Call light in reach and bed in lowest position. Pt and family deny any needs at present moment.

## 2018-10-11 NOTE — PROGRESS NOTES
Hospital PCP NICKIE CHILDRESS f/u appointment on Friday October 12,2018 @ 10:00 a.m. with Dr. Klever Abbott. Dr. Paulo Allen office will contact patient for appointment information. Added to AVS. Jennifer Hicks CM Specialist

## 2018-10-11 NOTE — DISCHARGE INSTRUCTIONS
Discharge Instructions       PATIENT ID: Steph Barakat  MRN: [id number]   YOB: 1955    DATE OF ADMISSION: 10/4/2018 10:37 AM    DATE OF DISCHARGE: 10/11/2018    PRIMARY CARE PROVIDER: Sridevi Parekh MD     ATTENDING PHYSICIAN: Umm Bryant  DISCHARGING PROVIDER: Umm Bryant MD    To contact this individual call [phone] and ask the  to page. If unavailable ask to be transferred the Adult Hospitalist Department. DISCHARGE DIAGNOSES   Acute on chronic respiratory failure could be due to the progression of her lung cancer  AMS - ? Chemotherapy related, metastasis ruled out   Malignancy related pain   Malignancy related nausea     CONSULTATIONS: IP CONSULT TO HOSPITALIST  IP CONSULT TO ONCOLOGY  IP CONSULT TO PALLIATIVE CARE - PROVIDER  IP CONSULT TO NEUROLOGY    PROCEDURES/SURGERIES: * No surgery found *    PENDING TEST RESULTS:   At the time of discharge the following test results are still pending: None    FOLLOW UP APPOINTMENTS:   Follow-up Information     Follow up With Details Comments Contact Info    Brandan Gupta MD In 1 week  [address] [facility] 600  [address] [facility]      Kulwinder Ramos MD In 1 week Please call if acupunture is desired. [facility] [address] [facility]      Klever Abbott MD In 1 week Hospital follow up  [facility] [address]  [phone]             ADDITIONAL CARE RECOMMENDATIONS:   1. Please make sure to take all medications as prescribed. - You should continue taking your scheduled pain medication: oxycontin 20mg every 12 hours, with as needed dilaudid. I have written an extra prescription for 7 days in case you do not have enough at home. - You should continue nausea medication to be taken AS NEEDED. I have provided a Reglan prescription for you when you go home.    2. You should follow up with your Oncology doctor in order to receive chemotherapy next week  3. The number for our palliative care team is provided above, should you wish to see them for acupuncture or any other symptomatic management. 4. Your MRI did not find a cause for your fogginess, and this is likely related to your malignancy and chemotherapy. You do not have any brain lesions. 5. Please come back to the hospital if you have untreated nausea and vomiting, unable to keep down anything by mouth including fluids, have intractable pain which is not helped with any pain medications. DIET: Regular Diet    ACTIVITY: Activity as tolerated    DISCHARGE MEDICATIONS:   See Medication Reconciliation Form    · It is important that you take the medication exactly as they are prescribed. · Keep your medication in the bottles provided by the pharmacist and keep a list of the medication names, dosages, and times to be taken in your wallet. · Do not take other medications without consulting your doctor. NOTIFY YOUR PHYSICIAN FOR ANY OF THE FOLLOWING:   Fever over 101 degrees for 24 hours. Chest pain, shortness of breath, fever, chills, nausea, vomiting, diarrhea, change in mentation, falling, weakness, bleeding. Severe pain or pain not relieved by medications. Or, any other signs or symptoms that you may have questions about. DISPOSITION:  X  Home With:   OT  PT  HH  RN       SNF/Inpatient Rehab/LTAC    Independent/assisted living    Hospice    Other:     CDMP Checked: Yes X     PROBLEM LIST Updated:   Yes X       Signed:   Laura Mckay MD  10/11/2018  8:23 AM

## 2018-10-11 NOTE — PROGRESS NOTES
-Hematology / Oncology ([facility]) - 
-Primary Oncologist- Dr Carlie Jade-  She is sleepy again this am, inattentive, has pain in back intermittently, nausea a little worse since stopping the compazine 
 
-O-  
  
Patient Vitals for the past 24 hrs: 
 Temp Pulse Resp BP SpO2  
10/11/18 0956 96.9 °F (36.1 °C) 95 18 118/81 99 % 10/11/18 0217 98.9 °F (37.2 °C) 88 18 154/86 100 % 10/10/18 1435 99.4 °F (37.4 °C) 89 16 152/83 100 % Gen: nad Chest: bilateral breath sounds present, crackles r base Cardiac: rrr, no m Abd: s/nt no hsm Ext, no cce Neuro non focal 
 
-Labs-   
Recent Labs 10/11/18 
 2177  10/10/18 
 0303  10/09/18 
 0216 WBC  21.9*  20.5*  17.9* HGB  9.6*  10.2*  10.5* PLT  399  516*  565* ANEU  18.2*  16.8*  14.7* NA  133*  130*  132* K  4.1  4.2  4.4 GLU  141*  161*  142* BUN  19  17  16 CREA  0.92  0.97  0.99 CA  8.6  9.5  9.0 MG  2.2  2.4   --   
PHOS  3.4  3.2   --   
 
 
-Imaging-  
 
 
-Assessment + Plan-    
*) NSCLC  
-- (EGFR mutated, S/p gefitinib and tagrisso, progression-> alimta/avastin s/p C2 9/14/18) -- chemo on hold   Was due 10/5. .. She desires continued Rx ,,, will await results of TTE. .. But if stable for discharge would consider getting her home and treated next week at Dr. Jaime Bryant office *)  Dyspnea No clear evidence of pneumonia No PE 
                     Decadron increased 10/5 to see if that improves symptoms 10/6,7,8 continues to  feel better' 
                         
  
*)  Pericardial effusion TTE no evidence of tampoanade 
  
*)  Anemia 9.9 a little lower today but no bleeding and no symptoms noted stable   
 
) confusion. . Not sure if this is new , nursing and I remember her from a prior admit with similar affect. .neurology assistance appreciated, brain mri is unremarkable today 
  
*)  DVT proph with lovenox ) nausea, controlled, she stopped the  compazine yesterday,would try scheduled reglan Disposition  Pt. To go home with family support Sean Stuart MD 
 
.

## 2018-10-11 NOTE — PROGRESS NOTES
Bedside and Verbal shift change report given to Rajan Poole (oncoming nurse) by [facility] (offgoing nurse). Report included the following information SBAR, MAR and Recent Results.

## 2018-10-17 ENCOUNTER — HOSPITAL ENCOUNTER (OUTPATIENT)
Dept: WOUND CARE | Age: 63
Discharge: HOME | End: 2018-10-17
Payer: COMMERCIAL

## 2018-10-17 ENCOUNTER — TELEPHONE (OUTPATIENT)
Dept: WOUND CARE | Age: 63
End: 2018-10-17

## 2018-10-17 VITALS
HEART RATE: 84 BPM | SYSTOLIC BLOOD PRESSURE: 143 MMHG | RESPIRATION RATE: 16 BRPM | TEMPERATURE: 97.8 F | DIASTOLIC BLOOD PRESSURE: 87 MMHG

## 2018-10-17 PROCEDURE — 74011000250 HC RX REV CODE- 250: Performed by: ORTHOPAEDIC SURGERY

## 2018-10-17 PROCEDURE — 97597 DBRDMT OPN WND 1ST 20 CM/<: CPT

## 2018-10-17 RX ORDER — LIDOCAINE HYDROCHLORIDE 20 MG/ML
JELLY TOPICAL AS NEEDED
Status: DISCONTINUED | OUTPATIENT
Start: 2018-10-17 | End: 2018-10-21 | Stop reason: HOSPADM

## 2018-10-17 RX ADMIN — LIDOCAINE HYDROCHLORIDE: 20 JELLY TOPICAL at 09:25

## 2018-10-17 NOTE — WOUND CARE
10/17/18 2289 Wound Buttocks Left Date First Assessed/Time First Assessed: 09/26/18 0848   POA: Yes  Location: Buttocks  Wound Description (Optional): L buttock, cluster of 3  Orientation: Left Dressing Type Applied Zinc based paste Wound Buttocks Right Date First Assessed/Time First Assessed: 09/26/18 0850   POA: Yes  Location: Buttocks  Wound Description (Optional): R buttock, cluster of 6   Orientation: Right Dressing Type Applied Zinc based paste;Silver products (border foam) Wound Sacral/coccyx Mid Date First Assessed/Time First Assessed: 10/17/18 0920   POA: Yes  Wound Type: Pressure injury  Location: Sacral/coccyx  Orientation: Mid  
Dressing Type Applied Silver products; Alginate 
(border foam) Wound Procedure Type Selective Debridement Procedure Time Out 7133 Consent Obtained  Yes Procedure Instrument dermal curette #5 Procedure Pain Scale Numeric 0/10 Post-Procedure Length (cm) 0.7 cm Post-Procedure Width (cm) 0.2 cm Post-Procedure Depth (cm) 0.1 cm Post-Procedure Volume (cm^3) 0.01 cm^3 Post-Procedure Surface Area (cm^2) 0.14 cm^2 Post Procedure Procedure Pain Scale Numeric 0/10 Assisted Physcian in procedure  No  
Procedure Tolerated Well Mid back has a blister. Pt stated using heat pad at night. Border foam on. Patient was discharged with family to home and was on wheelchair. In stable condition with c/o pain:0__/10_

## 2018-10-17 NOTE — PROGRESS NOTES
Wound Center Progress Note 
  
Date of Service: 10/17/2018  
  
Subjective:  
  
Chief Complaint: 
Isabel Hwang is a 58 y.o.  female who presents with bilateral buttock wounds of about 2 month's duration. She was discharged from the hospital on 10/11/18 for respiratory failure secondary to her lung cancer. She is somewhat improved. She is undergoing chemotherapy. 
  
Referred by:  Dr. Huy Kitchen 
  
HPI:    
Wound caused by: combination - has lung cancer, inactivity/pressure, possible herpes zoster lesions. Current wound care: 
Offloading wound: yes Appetite: fair Wound associated pain: mild Diabetic: No 
Smoker: No 
  
       
Past Medical History:  
Diagnosis Date  Arthritis    
 Autoimmune disease (HCC)    
  lupus  Cancer [facility])    
  lung cancer  Gastrointestinal disorder    
  GERD  Hypertension    
  
Physical Exam:  
  
       
Visit Vitals: VSS, Afebrile         
  
General: well developed, well nourished, pleasant , NAD. Hygiene good Psych: cooperative. Pleasant. No anxiety or depression. Normal mood and affect. Neuro: alert and oriented to person/place/situation. Otherwise nonfocal. 
HEENT: Normocephalic, atraumatic. EOMI. Conjunctiva clear. No scleral icterus. Chest: Respirations nonlabored. Abdomen:  Nondistended. 
  
  
Ulcer Location: Buttocks right small wounds, measured in groups. Etiology: combined Left measurement first, right buttock healed. Wound Length: 10.5 cm, Wound Width :  2.0 cm, Wound Depth : 0.1 cm Ulcer bed: Fibrotic slough   
Periwound: Normal 
Exudate: Scant/small amount Serous exudate Depth of Wound: To Fat Layer  
 
Small new sacral wound measuring 0.5 x 0.2 x 0.1 cm There is a new midback blister from a heating pad. 
  
  
Assessment:  
  
58 y. o. female with bilateral buttock sores. New blister in mid-back from heating pad. 
  
  
Plan: Selective debridement performed with 5 mm ring curette. Tolerated well, minimal blood loss, slough removed. Dressing: Aquacell Ag, border gauze.  Frequency: daily Cautioned about heating pad use. 
  
Patient understood and agrees with plan. Questions answered. 
  
Weekly visits and serial debridements also discussed.  
Follow up with me in 2 weeks. 
  
Signed By: Cassi Shearer MD

## 2018-10-17 NOTE — WOUND CARE
10/17/18 4795 Wound Buttocks Left Date First Assessed/Time First Assessed: 09/26/18 0848   POA: Yes  Location: Buttocks  Wound Description (Optional): L buttock, cluster of 3  Orientation: Left Wound Length (cm) 0 cm Wound Width (cm) 0 cm Wound Depth (cm) 0 Wound Surface area (cm^2) 0 cm^2 Change in Wound Size % 100 Condition of Base Pink Condition of Edges Open Drainage Amount  None Wound Odor None Periwound Skin Condition Excoriated Cleansing and Cleansing Agents  Normal saline Wound Buttocks Right Date First Assessed/Time First Assessed: 09/26/18 0850   POA: Yes  Location: Buttocks  Wound Description (Optional): R buttock, cluster of 6   Orientation: Right DRESSING STATUS Clean, dry, and intact DRESSING TYPE Aquacel;Alginate;Gauze;Special tape (comment) Wound Length (cm) 10.5 cm 
(Multiple wounds/skin tears) Wound Width (cm) 2 cm Wound Depth (cm) 0.1 Wound Surface area (cm^2) 21 cm^2 Change in Wound Size % -250 Condition of Base Pink Condition of Edges Open Drainage Amount  Moderate Drainage Color Sanguinous Wound Odor None Periwound Skin Condition Excoriated Cleansing and Cleansing Agents  Normal saline 10/17/18 6403 Wound Sacral/coccyx Mid Date First Assessed/Time First Assessed: 10/17/18 0920   POA: Yes  Wound Type: Pressure injury  Location: Sacral/coccyx  Orientation: Mid DRESSING STATUS Clean, dry, and intact DRESSING TYPE Aquacel;Alginate;Gauze;Special tape (comment) Wound Length (cm) 0.5 cm Wound Width (cm) 0.2 cm Wound Depth (cm) 0.1 Wound Surface area (cm^2) 0.1 cm^2 Condition of Base Pink Condition of Edges Open Drainage Amount  None Wound Odor None Periwound Skin Condition Intact Cleansing and Cleansing Agents  Normal saline Visit Vitals /87 Pulse 84 Temp 97.8 °F (36.6 °C) Resp 16

## 2020-08-07 NOTE — ED NOTES
I have reviewed discharge instructions with the patient. The patient verbalized understanding. PT GIVEN BED BATH AND SHAMPOO CAP, IN MUCH BETTER SPIRITS THIS AFTERNOON. SON
HAS GONE HOME BUT DAUGHTER IN LAW IN ROOM. STATES SHE IS COMFORTABLE AND HAS
HAD NO MORE NAUSEA.

## 2022-08-09 NOTE — PROGRESS NOTES
Hospitalist Progress Note Mercedes Oropeza MD 
     
                             Answering service: [phone] OR [phone] from in house phone Date of Service:  10/8/2018 NAME:  Richmond Rivera :  1955 MRN:  [id number] Admission Summary:  
 
69-year-old -American female with history of stage IV lung cancer with liver mass presented with dyspnea and increased oxygen requirement. She normally uses oxygen via nasal canula 1 l/min. Reason for follow up:  
 
Patient stating she has still not had a BM despite lactulose. Ongoing shoulder pain. Baseline SOB is not worse. No CP. Assessment & Plan:  
Acute on chronic respiratory failure could be due to the progression of her lung cancer -PFTs indicated restrictive pattern 
-CTA lung is negative for PE. There are multiple lesions, several subsegmental bronchi obstructed by the tumor. A large liver mass is present 
-TTE with EF of 56% - no WMA 
-Undergoing chemotherapy per Heme 
-Oncology recs appreciated; ?ready for discharge soon Gastroesophageal reflux disease: Continue PPI. Normocytic anemia likely of chronic disease: Stable. Constipation: Try enema today. Lupus. Shoulder pain: Try lidocaine patch. Mild Hyponatremia. ?Related to her malignancy Diet: Regular Code status: Full DVT prophylaxis: SCDs Care Plan discussed with: patient, daughter Hospital Problems  Date Reviewed: 2018 Codes Class Noted POA * (Principal)Acute on chronic respiratory failure (HCC) ICD-10-CM: J96.20 ICD-9-CM: 518.84  10/4/2018 Yes Review of Systems: A comprehensive review of systems was negative except for that written in the HPI. Physical Examination:  
 
 Last 24hrs VS reviewed since prior progress note. Most recent are: 
Visit Vitals REVIEW OF CARDIOVASCULAR SYSTEMS:  Cardiovascular problem(s): Shortness of Breath on Exertion, Swelling in Legs, Ankles, Abdomen and Pain in Calves When Walking    Patient does not smoke.    Patient seldom take aspirin.    BP (!) 175/96 (BP 1 Location: Left arm, BP Patient Position: At rest)  Pulse 81  Temp 97.8 °F (36.6 °C)  Resp 18  Ht 5' 2.5\" (1.588 m)  Wt 64.6 kg (142 lb 8 oz)  SpO2 98%  BMI 25.65 kg/m2 Constitutional:  No acute distress, cooperative, pleasant   
HEENT:  Neck supple Resp:  Coarse sounds and crackles bilaterally CV:  Regular rhythm, normal rate, no murmur GI:  Soft, non distended, non tender, normoactive bowel sounds Skin  :  No rash Musculoskeletal:  Pitting bilateral leg edema; warm Neurologic:  AAOx3, Moves all extremities. Psych:  Good insight, Not anxious nor agitated. No intake or output data in the 24 hours ending 10/08/18 0754 Data Review:  
 Review and/or order of clinical lab test 
Review and/or order of tests in the radiology section of CPT Review and/or order of tests in the medicine section of CPT Labs:  
 
Recent Labs 10/08/18 
 0327  10/07/18 
 4919 WBC  17.0*  14.1* HGB  9.8*  8.9* HCT  31.8*  28.2*  
PLT  580*  506* Recent Labs 10/08/18 
 0327  10/07/18 
 4489 NA  134*  134* K  4.2  4.2 CL  98  100 CO2  27  24 BUN  16  19 CREA  0.96  0.93 GLU  134*  139* CA  8.7  8.1* No results for input(s): SGOT, GPT, ALT, AP, TBIL, TBILI, TP, ALB, GLOB, GGT, AML, LPSE in the last 72 hours. No lab exists for component: AMYP, HLPSE No results for input(s): INR, PTP, APTT in the last 72 hours. No lab exists for component: INREXT, INREXT No results for input(s): FE, TIBC, PSAT, FERR in the last 72 hours. No results found for: FOL, RBCF No results for input(s): PH, PCO2, PO2 in the last 72 hours. No results for input(s): CPK, CKNDX, TROIQ in the last 72 hours. No lab exists for component: CPKMB No results found for: CHOL, CHOLX, CHLST, CHOLV, HDL, LDL, LDLC, DLDLP, TGLX, TRIGL, TRIGP, CHHD, CHHDX Lab Results Component Value Date/Time  Glucose (POC) 119 (H) 08/31/2018 11:10 AM  
 Glucose (POC) 94 08/31/2018 06:40 AM  
 Glucose (POC) 136 (H) 08/30/2018 10:12 PM  
 Glucose (POC) 114 (H) 08/30/2018 04:15 PM  
 Glucose (POC) 133 (H) 08/30/2018 11:06 AM  
 
Lab Results Component Value Date/Time Color YELLOW/STRAW 10/04/2018 01:19 PM  
 Appearance CLEAR 10/04/2018 01:19 PM  
 Specific gravity 1.019 10/04/2018 01:19 PM  
 pH (UA) 6.5 10/04/2018 01:19 PM  
 Protein NEGATIVE  10/04/2018 01:19 PM  
 Glucose NEGATIVE  10/04/2018 01:19 PM  
 Ketone NEGATIVE  10/04/2018 01:19 PM  
 Bilirubin NEGATIVE  10/04/2018 01:19 PM  
 Urobilinogen 0.2 10/04/2018 01:19 PM  
 Nitrites NEGATIVE  10/04/2018 01:19 PM  
 Leukocyte Esterase NEGATIVE  10/04/2018 01:19 PM  
 Epithelial cells FEW 10/04/2018 01:19 PM  
 Bacteria NEGATIVE  10/04/2018 01:19 PM  
 WBC 0-4 10/04/2018 01:19 PM  
 RBC 0-5 10/04/2018 01:19 PM  
 
 
 
Medications Reviewed:  
 
Current Facility-Administered Medications Medication Dose Route Frequency  docusate sodium (COLACE) capsule 100 mg  100 mg Oral BID  lactulose (CHRONULAC) solution 20 g  30 mL Oral BID  polyethylene glycol (MIRALAX) packet 17 g  17 g Oral BID  senna-docusate (PERICOLACE) 8.6-50 mg per tablet 2 Tab  2 Tab Oral DAILY  dexamethasone (DECADRON) tablet 8 mg  8 mg Oral Q12H  prochlorperazine (COMPAZINE) tablet 5 mg  5 mg Oral Q6H PRN  
 amLODIPine (NORVASC) tablet 10 mg  10 mg Oral DAILY  acyclovir (ZOVIRAX) tablet 400 mg  400 mg Oral BID  aspirin delayed-release tablet 81 mg  81 mg Oral DAILY  calcium-vitamin D (OS-KENIA) 500 mg-200 unit tablet  1 Tab Oral BID  folic acid (FOLVITE) tablet 1 mg  1 mg Oral DAILY  HYDROmorphone (DILAUDID) tablet 2 mg  2 mg Oral Q4H PRN  
 LORazepam (ATIVAN) tablet 2 mg  2 mg Oral Q8H PRN  mirtazapine (REMERON SOL-TAB) disintegrating tablet 15 mg  15 mg Oral QHS  oxyCODONE ER (OxyCONTIN) tablet 20 mg  20 mg Oral Q12H  furosemide (LASIX) injection 40 mg  40 mg IntraVENous DAILY  sodium chloride (NS) flush 5-10 mL  5-10 mL IntraVENous Q8H  
 sodium chloride (NS) flush 5-10 mL  5-10 mL IntraVENous PRN  
 naloxone (NARCAN) injection 0.4 mg  0.4 mg IntraVENous PRN  
 enoxaparin (LOVENOX) injection 40 mg  40 mg SubCUTAneous Q24H  
 acetaminophen (TYLENOL) tablet 650 mg  650 mg Oral Q4H PRN  
 ondansetron (ZOFRAN) injection 4 mg  4 mg IntraVENous Q4H PRN  
 
______________________________________________________________________ EXPECTED LENGTH OF STAY: 3d 19h ACTUAL LENGTH OF STAY:          4 Judi Barkley MD

## 2023-09-07 NOTE — PROGRESS NOTES
Hospital follow-up PCP transitional care appointment has been scheduled with Dr. Paige Buckner for Friday, 9/7/17 at 10:30 a.m. Pending patient discharge.   Arabella Maria, Care Management Specialist. 
 Non-Graft Cartilage Fenestration Text: The cartilage was fenestrated with a 2mm punch biopsy to help facilitate healing.